# Patient Record
Sex: FEMALE | Race: WHITE | NOT HISPANIC OR LATINO | Employment: UNEMPLOYED | URBAN - METROPOLITAN AREA
[De-identification: names, ages, dates, MRNs, and addresses within clinical notes are randomized per-mention and may not be internally consistent; named-entity substitution may affect disease eponyms.]

---

## 2023-05-26 LAB
ALBUMIN (G/DL) IN SER/PLAS: 4.1 G/DL (ref 3.4–5)
ANION GAP IN SER/PLAS: 14 MMOL/L (ref 10–20)
CALCIDIOL (25 OH VITAMIN D3) (NG/ML) IN SER/PLAS: 38 NG/ML
CALCIUM (MG/DL) IN SER/PLAS: 8.9 MG/DL (ref 8.6–10.3)
CARBON DIOXIDE, TOTAL (MMOL/L) IN SER/PLAS: 24 MMOL/L (ref 21–32)
CHLORIDE (MMOL/L) IN SER/PLAS: 103 MMOL/L (ref 98–107)
CREATININE (MG/DL) IN SER/PLAS: 0.81 MG/DL (ref 0.5–1.05)
CREATININE (MG/DL) IN URINE: 52.7 MG/DL (ref 20–320)
ERYTHROCYTE DISTRIBUTION WIDTH (RATIO) BY AUTOMATED COUNT: 13.6 % (ref 11.5–14.5)
ERYTHROCYTE MEAN CORPUSCULAR HEMOGLOBIN CONCENTRATION (G/DL) BY AUTOMATED: 32.3 G/DL (ref 32–36)
ERYTHROCYTE MEAN CORPUSCULAR VOLUME (FL) BY AUTOMATED COUNT: 97 FL (ref 80–100)
ERYTHROCYTES (10*6/UL) IN BLOOD BY AUTOMATED COUNT: 3.29 X10E12/L (ref 4–5.2)
GFR FEMALE: 68 ML/MIN/1.73M2
GLUCOSE (MG/DL) IN SER/PLAS: 98 MG/DL (ref 74–99)
HEMATOCRIT (%) IN BLOOD BY AUTOMATED COUNT: 31.9 % (ref 36–46)
HEMOGLOBIN (G/DL) IN BLOOD: 10.3 G/DL (ref 12–16)
LEUKOCYTES (10*3/UL) IN BLOOD BY AUTOMATED COUNT: 6.6 X10E9/L (ref 4.4–11.3)
PHOSPHATE (MG/DL) IN SER/PLAS: 4.8 MG/DL (ref 2.5–4.9)
PLATELETS (10*3/UL) IN BLOOD AUTOMATED COUNT: 264 X10E9/L (ref 150–450)
POTASSIUM (MMOL/L) IN SER/PLAS: 5.2 MMOL/L (ref 3.5–5.3)
PROTEIN (MG/DL) IN URINE: 14 MG/DL (ref 5–24)
PROTEIN/CREATININE (MG/MG) IN URINE: 0.27 MG/MG CREAT (ref 0–0.17)
SODIUM (MMOL/L) IN SER/PLAS: 136 MMOL/L (ref 136–145)
UREA NITROGEN (MG/DL) IN SER/PLAS: 26 MG/DL (ref 6–23)

## 2023-05-27 LAB — PARATHYRIN INTACT (PG/ML) IN SER/PLAS: 136.4 PG/ML (ref 18.5–88)

## 2024-06-14 ENCOUNTER — HOSPITAL ENCOUNTER (INPATIENT)
Facility: HOSPITAL | Age: 89
DRG: 536 | End: 2024-06-14
Attending: EMERGENCY MEDICINE | Admitting: INTERNAL MEDICINE
Payer: MEDICARE

## 2024-06-14 ENCOUNTER — APPOINTMENT (OUTPATIENT)
Dept: RADIOLOGY | Facility: HOSPITAL | Age: 89
DRG: 536 | End: 2024-06-14
Payer: MEDICARE

## 2024-06-14 ENCOUNTER — APPOINTMENT (OUTPATIENT)
Dept: CARDIOLOGY | Facility: HOSPITAL | Age: 89
DRG: 536 | End: 2024-06-14
Payer: MEDICARE

## 2024-06-14 DIAGNOSIS — S50.02XA CONTUSION OF LEFT ELBOW, INITIAL ENCOUNTER: ICD-10-CM

## 2024-06-14 DIAGNOSIS — S72.115A CLOSED NONDISPLACED FRACTURE OF GREATER TROCHANTER OF LEFT FEMUR, INITIAL ENCOUNTER (MULTI): Primary | ICD-10-CM

## 2024-06-14 DIAGNOSIS — I10 HYPERTENSION, UNSPECIFIED TYPE: ICD-10-CM

## 2024-06-14 DIAGNOSIS — N30.00 ACUTE CYSTITIS WITHOUT HEMATURIA: ICD-10-CM

## 2024-06-14 DIAGNOSIS — R53.1 WEAKNESS: ICD-10-CM

## 2024-06-14 PROBLEM — N39.0 UTI (URINARY TRACT INFECTION): Status: ACTIVE | Noted: 2024-06-14

## 2024-06-14 PROBLEM — D64.9 ANEMIA: Status: ACTIVE | Noted: 2024-06-14

## 2024-06-14 PROBLEM — E11.9 DIABETES (MULTI): Status: RESOLVED | Noted: 2024-06-14 | Resolved: 2024-06-14

## 2024-06-14 PROBLEM — R74.8 ELEVATED CK: Status: ACTIVE | Noted: 2024-06-14

## 2024-06-14 PROBLEM — E11.9 DIABETES (MULTI): Status: ACTIVE | Noted: 2024-06-14

## 2024-06-14 LAB
ALBUMIN SERPL BCP-MCNC: 4.1 G/DL (ref 3.4–5)
ALP SERPL-CCNC: 82 U/L (ref 33–136)
ALT SERPL W P-5'-P-CCNC: 13 U/L (ref 7–45)
ANION GAP SERPL CALC-SCNC: 15 MMOL/L (ref 10–20)
APPEARANCE UR: ABNORMAL
AST SERPL W P-5'-P-CCNC: 25 U/L (ref 9–39)
ATRIAL RATE: 104 BPM
BACTERIA #/AREA URNS AUTO: ABNORMAL /HPF
BASOPHILS # BLD AUTO: 0.06 X10*3/UL (ref 0–0.1)
BASOPHILS NFR BLD AUTO: 0.4 %
BILIRUB SERPL-MCNC: 0.6 MG/DL (ref 0–1.2)
BILIRUB UR STRIP.AUTO-MCNC: NEGATIVE MG/DL
BUN SERPL-MCNC: 29 MG/DL (ref 6–23)
CALCIUM SERPL-MCNC: 8.9 MG/DL (ref 8.6–10.3)
CHLORIDE SERPL-SCNC: 102 MMOL/L (ref 98–107)
CK SERPL-CCNC: 329 U/L (ref 0–215)
CO2 SERPL-SCNC: 24 MMOL/L (ref 21–32)
COLOR UR: ABNORMAL
CREAT SERPL-MCNC: 0.77 MG/DL (ref 0.5–1.05)
EGFRCR SERPLBLD CKD-EPI 2021: 72 ML/MIN/1.73M*2
EOSINOPHIL # BLD AUTO: 0.01 X10*3/UL (ref 0–0.4)
EOSINOPHIL NFR BLD AUTO: 0.1 %
ERYTHROCYTE [DISTWIDTH] IN BLOOD BY AUTOMATED COUNT: 13.4 % (ref 11.5–14.5)
GLUCOSE BLD MANUAL STRIP-MCNC: 126 MG/DL (ref 74–99)
GLUCOSE BLD MANUAL STRIP-MCNC: 177 MG/DL (ref 74–99)
GLUCOSE SERPL-MCNC: 281 MG/DL (ref 74–99)
GLUCOSE UR STRIP.AUTO-MCNC: NORMAL MG/DL
HCT VFR BLD AUTO: 31.9 % (ref 36–46)
HGB BLD-MCNC: 10.7 G/DL (ref 12–16)
IMM GRANULOCYTES # BLD AUTO: 0.1 X10*3/UL (ref 0–0.5)
IMM GRANULOCYTES NFR BLD AUTO: 0.7 % (ref 0–0.9)
INR PPP: 1.1 (ref 0.9–1.1)
KETONES UR STRIP.AUTO-MCNC: NEGATIVE MG/DL
LEUKOCYTE ESTERASE UR QL STRIP.AUTO: ABNORMAL
LYMPHOCYTES # BLD AUTO: 0.86 X10*3/UL (ref 0.8–3)
LYMPHOCYTES NFR BLD AUTO: 5.9 %
MCH RBC QN AUTO: 30.9 PG (ref 26–34)
MCHC RBC AUTO-ENTMCNC: 33.5 G/DL (ref 32–36)
MCV RBC AUTO: 92 FL (ref 80–100)
MONOCYTES # BLD AUTO: 1.47 X10*3/UL (ref 0.05–0.8)
MONOCYTES NFR BLD AUTO: 10.1 %
NEUTROPHILS # BLD AUTO: 12.02 X10*3/UL (ref 1.6–5.5)
NEUTROPHILS NFR BLD AUTO: 82.8 %
NITRITE UR QL STRIP.AUTO: NEGATIVE
NRBC BLD-RTO: 0 /100 WBCS (ref 0–0)
P AXIS: 65 DEGREES
PH UR STRIP.AUTO: 5.5 [PH]
PLATELET # BLD AUTO: 254 X10*3/UL (ref 150–450)
POTASSIUM SERPL-SCNC: 5 MMOL/L (ref 3.5–5.3)
PR INTERVAL: 173 MS
PROT SERPL-MCNC: 7 G/DL (ref 6.4–8.2)
PROT UR STRIP.AUTO-MCNC: ABNORMAL MG/DL
PROTHROMBIN TIME: 12.4 SECONDS (ref 9.8–12.8)
Q ONSET: 252 MS
QRS COUNT: 17 BEATS
QRS DURATION: 83 MS
QT INTERVAL: 397 MS
QTC CALCULATION(BAZETT): 525 MS
QTC FREDERICIA: 478 MS
R AXIS: 10 DEGREES
RBC # BLD AUTO: 3.46 X10*6/UL (ref 4–5.2)
RBC # UR STRIP.AUTO: ABNORMAL /UL
RBC #/AREA URNS AUTO: ABNORMAL /HPF
SODIUM SERPL-SCNC: 136 MMOL/L (ref 136–145)
SP GR UR STRIP.AUTO: 1.01
T OFFSET: 451 MS
UROBILINOGEN UR STRIP.AUTO-MCNC: NORMAL MG/DL
VENTRICULAR RATE: 105 BPM
WBC # BLD AUTO: 14.5 X10*3/UL (ref 4.4–11.3)
WBC #/AREA URNS AUTO: ABNORMAL /HPF

## 2024-06-14 PROCEDURE — 73080 X-RAY EXAM OF ELBOW: CPT | Mod: LEFT SIDE | Performed by: RADIOLOGY

## 2024-06-14 PROCEDURE — 82947 ASSAY GLUCOSE BLOOD QUANT: CPT | Mod: 91

## 2024-06-14 PROCEDURE — 73030 X-RAY EXAM OF SHOULDER: CPT | Mod: LEFT SIDE | Performed by: RADIOLOGY

## 2024-06-14 PROCEDURE — 93005 ELECTROCARDIOGRAM TRACING: CPT

## 2024-06-14 PROCEDURE — 73502 X-RAY EXAM HIP UNI 2-3 VIEWS: CPT | Mod: LT

## 2024-06-14 PROCEDURE — 82550 ASSAY OF CK (CPK): CPT | Performed by: EMERGENCY MEDICINE

## 2024-06-14 PROCEDURE — 2500000001 HC RX 250 WO HCPCS SELF ADMINISTERED DRUGS (ALT 637 FOR MEDICARE OP): Performed by: NURSE PRACTITIONER

## 2024-06-14 PROCEDURE — 2500000004 HC RX 250 GENERAL PHARMACY W/ HCPCS (ALT 636 FOR OP/ED): Performed by: EMERGENCY MEDICINE

## 2024-06-14 PROCEDURE — 73721 MRI JNT OF LWR EXTRE W/O DYE: CPT | Mod: LT

## 2024-06-14 PROCEDURE — 85025 COMPLETE CBC W/AUTO DIFF WBC: CPT | Performed by: EMERGENCY MEDICINE

## 2024-06-14 PROCEDURE — 72125 CT NECK SPINE W/O DYE: CPT

## 2024-06-14 PROCEDURE — 1210000001 HC SEMI-PRIVATE ROOM DAILY

## 2024-06-14 PROCEDURE — 70450 CT HEAD/BRAIN W/O DYE: CPT | Performed by: RADIOLOGY

## 2024-06-14 PROCEDURE — 36415 COLL VENOUS BLD VENIPUNCTURE: CPT | Performed by: EMERGENCY MEDICINE

## 2024-06-14 PROCEDURE — 99222 1ST HOSP IP/OBS MODERATE 55: CPT | Performed by: STUDENT IN AN ORGANIZED HEALTH CARE EDUCATION/TRAINING PROGRAM

## 2024-06-14 PROCEDURE — 96365 THER/PROPH/DIAG IV INF INIT: CPT

## 2024-06-14 PROCEDURE — 2500000002 HC RX 250 W HCPCS SELF ADMINISTERED DRUGS (ALT 637 FOR MEDICARE OP, ALT 636 FOR OP/ED): Performed by: NURSE PRACTITIONER

## 2024-06-14 PROCEDURE — 2500000004 HC RX 250 GENERAL PHARMACY W/ HCPCS (ALT 636 FOR OP/ED): Performed by: NURSE PRACTITIONER

## 2024-06-14 PROCEDURE — 72192 CT PELVIS W/O DYE: CPT

## 2024-06-14 PROCEDURE — 99285 EMERGENCY DEPT VISIT HI MDM: CPT | Mod: 25

## 2024-06-14 PROCEDURE — 72125 CT NECK SPINE W/O DYE: CPT | Performed by: RADIOLOGY

## 2024-06-14 PROCEDURE — 87086 URINE CULTURE/COLONY COUNT: CPT | Mod: PORLAB | Performed by: EMERGENCY MEDICINE

## 2024-06-14 PROCEDURE — 85610 PROTHROMBIN TIME: CPT | Performed by: EMERGENCY MEDICINE

## 2024-06-14 PROCEDURE — 83036 HEMOGLOBIN GLYCOSYLATED A1C: CPT | Performed by: NURSE PRACTITIONER

## 2024-06-14 PROCEDURE — 96375 TX/PRO/DX INJ NEW DRUG ADDON: CPT

## 2024-06-14 PROCEDURE — 70450 CT HEAD/BRAIN W/O DYE: CPT

## 2024-06-14 PROCEDURE — 80053 COMPREHEN METABOLIC PANEL: CPT | Performed by: EMERGENCY MEDICINE

## 2024-06-14 PROCEDURE — 73502 X-RAY EXAM HIP UNI 2-3 VIEWS: CPT | Mod: LEFT SIDE | Performed by: RADIOLOGY

## 2024-06-14 PROCEDURE — 73030 X-RAY EXAM OF SHOULDER: CPT | Mod: LT

## 2024-06-14 PROCEDURE — 73080 X-RAY EXAM OF ELBOW: CPT | Mod: LT

## 2024-06-14 PROCEDURE — 99222 1ST HOSP IP/OBS MODERATE 55: CPT | Performed by: NURSE PRACTITIONER

## 2024-06-14 PROCEDURE — 72192 CT PELVIS W/O DYE: CPT | Mod: FOREIGN READ | Performed by: RADIOLOGY

## 2024-06-14 PROCEDURE — 81001 URINALYSIS AUTO W/SCOPE: CPT | Performed by: EMERGENCY MEDICINE

## 2024-06-14 RX ORDER — DEXTROSE 50 % IN WATER (D50W) INTRAVENOUS SYRINGE
12.5
Status: DISCONTINUED | OUTPATIENT
Start: 2024-06-14 | End: 2024-06-18 | Stop reason: HOSPADM

## 2024-06-14 RX ORDER — PHENYTOIN SODIUM 100 MG/1
100 CAPSULE, EXTENDED RELEASE ORAL 3 TIMES DAILY
Status: DISCONTINUED | OUTPATIENT
Start: 2024-06-14 | End: 2024-06-18 | Stop reason: HOSPADM

## 2024-06-14 RX ORDER — ATORVASTATIN CALCIUM 40 MG/1
40 TABLET, FILM COATED ORAL DAILY
COMMUNITY

## 2024-06-14 RX ORDER — ONDANSETRON 4 MG/1
4 TABLET, ORALLY DISINTEGRATING ORAL EVERY 8 HOURS PRN
Status: DISCONTINUED | OUTPATIENT
Start: 2024-06-14 | End: 2024-06-18 | Stop reason: HOSPADM

## 2024-06-14 RX ORDER — ONDANSETRON HYDROCHLORIDE 2 MG/ML
4 INJECTION, SOLUTION INTRAVENOUS ONCE
Status: COMPLETED | OUTPATIENT
Start: 2024-06-14 | End: 2024-06-14

## 2024-06-14 RX ORDER — POLYETHYLENE GLYCOL 3350 17 G/17G
17 POWDER, FOR SOLUTION ORAL DAILY
Status: DISCONTINUED | OUTPATIENT
Start: 2024-06-14 | End: 2024-06-18 | Stop reason: HOSPADM

## 2024-06-14 RX ORDER — PHENYTOIN SODIUM 100 MG/1
100 CAPSULE, EXTENDED RELEASE ORAL 3 TIMES DAILY
COMMUNITY

## 2024-06-14 RX ORDER — PANTOPRAZOLE SODIUM 40 MG/1
40 TABLET, DELAYED RELEASE ORAL
COMMUNITY

## 2024-06-14 RX ORDER — BISACODYL 5 MG
10 TABLET, DELAYED RELEASE (ENTERIC COATED) ORAL DAILY PRN
Status: DISCONTINUED | OUTPATIENT
Start: 2024-06-14 | End: 2024-06-18 | Stop reason: HOSPADM

## 2024-06-14 RX ORDER — LEVOTHYROXINE SODIUM 112 UG/1
112 TABLET ORAL DAILY
Status: DISCONTINUED | OUTPATIENT
Start: 2024-06-14 | End: 2024-06-18 | Stop reason: HOSPADM

## 2024-06-14 RX ORDER — ATORVASTATIN CALCIUM 40 MG/1
40 TABLET, FILM COATED ORAL NIGHTLY
Status: DISCONTINUED | OUTPATIENT
Start: 2024-06-14 | End: 2024-06-18 | Stop reason: HOSPADM

## 2024-06-14 RX ORDER — SODIUM CHLORIDE 9 MG/ML
75 INJECTION, SOLUTION INTRAVENOUS CONTINUOUS
Status: DISCONTINUED | OUTPATIENT
Start: 2024-06-14 | End: 2024-06-15

## 2024-06-14 RX ORDER — BISACODYL 10 MG/1
10 SUPPOSITORY RECTAL DAILY PRN
Status: DISCONTINUED | OUTPATIENT
Start: 2024-06-14 | End: 2024-06-18 | Stop reason: HOSPADM

## 2024-06-14 RX ORDER — LOSARTAN POTASSIUM 50 MG/1
100 TABLET ORAL DAILY
COMMUNITY

## 2024-06-14 RX ORDER — PANTOPRAZOLE SODIUM 40 MG/10ML
40 INJECTION, POWDER, LYOPHILIZED, FOR SOLUTION INTRAVENOUS
Status: DISCONTINUED | OUTPATIENT
Start: 2024-06-15 | End: 2024-06-18 | Stop reason: HOSPADM

## 2024-06-14 RX ORDER — CEFTRIAXONE 1 G/50ML
1 INJECTION, SOLUTION INTRAVENOUS ONCE
Status: COMPLETED | OUTPATIENT
Start: 2024-06-14 | End: 2024-06-14

## 2024-06-14 RX ORDER — DEXTROSE 50 % IN WATER (D50W) INTRAVENOUS SYRINGE
25
Status: DISCONTINUED | OUTPATIENT
Start: 2024-06-14 | End: 2024-06-18 | Stop reason: HOSPADM

## 2024-06-14 RX ORDER — FENTANYL CITRATE 50 UG/ML
25 INJECTION, SOLUTION INTRAMUSCULAR; INTRAVENOUS ONCE
Status: COMPLETED | OUTPATIENT
Start: 2024-06-14 | End: 2024-06-14

## 2024-06-14 RX ORDER — BISACODYL 5 MG
15 TABLET, DELAYED RELEASE (ENTERIC COATED) ORAL ONCE
Status: COMPLETED | OUTPATIENT
Start: 2024-06-14 | End: 2024-06-14

## 2024-06-14 RX ORDER — PANTOPRAZOLE SODIUM 40 MG/1
40 TABLET, DELAYED RELEASE ORAL
Status: DISCONTINUED | OUTPATIENT
Start: 2024-06-15 | End: 2024-06-18 | Stop reason: HOSPADM

## 2024-06-14 RX ORDER — HEPARIN SODIUM 5000 [USP'U]/ML
5000 INJECTION, SOLUTION INTRAVENOUS; SUBCUTANEOUS EVERY 8 HOURS
Status: DISCONTINUED | OUTPATIENT
Start: 2024-06-14 | End: 2024-06-18 | Stop reason: HOSPADM

## 2024-06-14 RX ORDER — LOSARTAN POTASSIUM 50 MG/1
100 TABLET ORAL DAILY
Status: DISCONTINUED | OUTPATIENT
Start: 2024-06-14 | End: 2024-06-18 | Stop reason: HOSPADM

## 2024-06-14 RX ORDER — ONDANSETRON HYDROCHLORIDE 2 MG/ML
4 INJECTION, SOLUTION INTRAVENOUS EVERY 8 HOURS PRN
Status: DISCONTINUED | OUTPATIENT
Start: 2024-06-14 | End: 2024-06-18 | Stop reason: HOSPADM

## 2024-06-14 RX ORDER — LEVOTHYROXINE SODIUM 112 UG/1
CAPSULE ORAL DAILY
COMMUNITY

## 2024-06-14 RX ORDER — CALCIUM CARBONATE 200(500)MG
500 TABLET,CHEWABLE ORAL 4 TIMES DAILY PRN
Status: DISCONTINUED | OUTPATIENT
Start: 2024-06-14 | End: 2024-06-18 | Stop reason: HOSPADM

## 2024-06-14 SDOH — SOCIAL STABILITY: SOCIAL INSECURITY: HAVE YOU HAD THOUGHTS OF HARMING ANYONE ELSE?: NO

## 2024-06-14 SDOH — SOCIAL STABILITY: SOCIAL INSECURITY: WERE YOU ABLE TO COMPLETE ALL THE BEHAVIORAL HEALTH SCREENINGS?: YES

## 2024-06-14 ASSESSMENT — PAIN DESCRIPTION - ORIENTATION: ORIENTATION: LEFT

## 2024-06-14 ASSESSMENT — ENCOUNTER SYMPTOMS
FATIGUE: 0
NECK PAIN: 0
PALPITATIONS: 0
UNEXPECTED WEIGHT CHANGE: 0
CONFUSION: 0
WEAKNESS: 0
EYE ITCHING: 0
ADENOPATHY: 0
HALLUCINATIONS: 0
DYSPHORIC MOOD: 0
EYE DISCHARGE: 0
VOICE CHANGE: 0
BLOOD IN STOOL: 0
FLANK PAIN: 0
CHOKING: 0
BRUISES/BLEEDS EASILY: 0
DIARRHEA: 0
POLYDIPSIA: 0
WOUND: 0
EYE PAIN: 0
NECK STIFFNESS: 0
PHOTOPHOBIA: 0
FEVER: 0
COLOR CHANGE: 0
SHORTNESS OF BREATH: 0
APNEA: 0
TREMORS: 0
ARTHRALGIAS: 0
FREQUENCY: 0
WHEEZING: 0
ABDOMINAL DISTENTION: 0
DYSURIA: 0
DIFFICULTY URINATING: 0
COUGH: 0
SORE THROAT: 0
APPETITE CHANGE: 0
BACK PAIN: 1
ABDOMINAL PAIN: 0
SLEEP DISTURBANCE: 0
NAUSEA: 0
DIZZINESS: 0
HEMATURIA: 0
NERVOUS/ANXIOUS: 0
HEADACHES: 0
NUMBNESS: 0
SINUS PAIN: 0
VOMITING: 0
MYALGIAS: 0
TROUBLE SWALLOWING: 0
CHEST TIGHTNESS: 0
SEIZURES: 0
POLYPHAGIA: 0
LIGHT-HEADEDNESS: 0
CONSTIPATION: 0
CHILLS: 0
SPEECH DIFFICULTY: 0

## 2024-06-14 ASSESSMENT — COGNITIVE AND FUNCTIONAL STATUS - GENERAL
HELP NEEDED FOR BATHING: A LITTLE
TURNING FROM BACK TO SIDE WHILE IN FLAT BAD: A LOT
PATIENT BASELINE BEDBOUND: NO
MOBILITY SCORE: 9
TURNING FROM BACK TO SIDE WHILE IN FLAT BAD: A LOT
CLIMB 3 TO 5 STEPS WITH RAILING: TOTAL
MOBILITY SCORE: 9
DRESSING REGULAR UPPER BODY CLOTHING: A LITTLE
DRESSING REGULAR UPPER BODY CLOTHING: A LITTLE
MOBILITY SCORE: 9
MOVING TO AND FROM BED TO CHAIR: TOTAL
DRESSING REGULAR LOWER BODY CLOTHING: TOTAL
WALKING IN HOSPITAL ROOM: TOTAL
MOVING TO AND FROM BED TO CHAIR: TOTAL
TOILETING: A LOT
TOILETING: A LOT
CLIMB 3 TO 5 STEPS WITH RAILING: TOTAL
MOVING FROM LYING ON BACK TO SITTING ON SIDE OF FLAT BED WITH BEDRAILS: A LITTLE
STANDING UP FROM CHAIR USING ARMS: TOTAL
DAILY ACTIVITIY SCORE: 22
HELP NEEDED FOR BATHING: A LOT
STANDING UP FROM CHAIR USING ARMS: TOTAL
MOVING FROM LYING ON BACK TO SITTING ON SIDE OF FLAT BED WITH BEDRAILS: A LITTLE
WALKING IN HOSPITAL ROOM: TOTAL
TURNING FROM BACK TO SIDE WHILE IN FLAT BAD: A LOT
DRESSING REGULAR LOWER BODY CLOTHING: TOTAL
DAILY ACTIVITIY SCORE: 16
CLIMB 3 TO 5 STEPS WITH RAILING: TOTAL
MOVING FROM LYING ON BACK TO SITTING ON SIDE OF FLAT BED WITH BEDRAILS: A LITTLE
HELP NEEDED FOR BATHING: A LOT
DAILY ACTIVITIY SCORE: 16
STANDING UP FROM CHAIR USING ARMS: TOTAL
DRESSING REGULAR LOWER BODY CLOTHING: A LITTLE
WALKING IN HOSPITAL ROOM: TOTAL
MOVING TO AND FROM BED TO CHAIR: TOTAL

## 2024-06-14 ASSESSMENT — ACTIVITIES OF DAILY LIVING (ADL)
TOILETING: NEEDS ASSISTANCE
HEARING - LEFT EAR: FUNCTIONAL
DRESSING YOURSELF: INDEPENDENT
JUDGMENT_ADEQUATE_SAFELY_COMPLETE_DAILY_ACTIVITIES: YES
GROOMING: INDEPENDENT
ADEQUATE_TO_COMPLETE_ADL: YES
JUDGMENT_ADEQUATE_SAFELY_COMPLETE_DAILY_ACTIVITIES: YES
PATIENT'S MEMORY ADEQUATE TO SAFELY COMPLETE DAILY ACTIVITIES?: YES
ADEQUATE_TO_COMPLETE_ADL: YES
FEEDING YOURSELF: INDEPENDENT
DRESSING YOURSELF: NEEDS ASSISTANCE
GROOMING: INDEPENDENT
WALKS IN HOME: INDEPENDENT
HEARING - LEFT EAR: FUNCTIONAL
PATIENT'S MEMORY ADEQUATE TO SAFELY COMPLETE DAILY ACTIVITIES?: YES
BATHING: NEEDS ASSISTANCE
LACK_OF_TRANSPORTATION: NO
HEARING - RIGHT EAR: FUNCTIONAL
HEARING - RIGHT EAR: FUNCTIONAL
FEEDING YOURSELF: INDEPENDENT
WALKS IN HOME: NEEDS ASSISTANCE
TOILETING: INDEPENDENT
BATHING: INDEPENDENT

## 2024-06-14 ASSESSMENT — LIFESTYLE VARIABLES
SUBSTANCE_ABUSE_PAST_12_MONTHS: NO
HOW OFTEN DO YOU HAVE 6 OR MORE DRINKS ON ONE OCCASION: NEVER
TOTAL SCORE: 0
EVER HAD A DRINK FIRST THING IN THE MORNING TO STEADY YOUR NERVES TO GET RID OF A HANGOVER: NO
HAVE PEOPLE ANNOYED YOU BY CRITICIZING YOUR DRINKING: NO
HOW OFTEN DO YOU HAVE A DRINK CONTAINING ALCOHOL: NEVER
EVER FELT BAD OR GUILTY ABOUT YOUR DRINKING: NO
PRESCIPTION_ABUSE_PAST_12_MONTHS: NO
HAVE YOU EVER FELT YOU SHOULD CUT DOWN ON YOUR DRINKING: NO
SKIP TO QUESTIONS 9-10: 1
AUDIT-C TOTAL SCORE: 0
AUDIT-C TOTAL SCORE: 0
HOW MANY STANDARD DRINKS CONTAINING ALCOHOL DO YOU HAVE ON A TYPICAL DAY: PATIENT DOES NOT DRINK

## 2024-06-14 ASSESSMENT — PATIENT HEALTH QUESTIONNAIRE - PHQ9
SUM OF ALL RESPONSES TO PHQ9 QUESTIONS 1 & 2: 0
1. LITTLE INTEREST OR PLEASURE IN DOING THINGS: NOT AT ALL
2. FEELING DOWN, DEPRESSED OR HOPELESS: NOT AT ALL

## 2024-06-14 ASSESSMENT — PAIN SCALES - GENERAL
PAINLEVEL_OUTOF10: 6
PAINLEVEL_OUTOF10: 4
PAINLEVEL_OUTOF10: 6
PAINLEVEL_OUTOF10: 7
PAINLEVEL_OUTOF10: 3
PAINLEVEL_OUTOF10: 0 - NO PAIN
PAINLEVEL_OUTOF10: 9
PAINLEVEL_OUTOF10: 3
PAINLEVEL_OUTOF10: 0 - NO PAIN
PAINLEVEL_OUTOF10: 4

## 2024-06-14 ASSESSMENT — PAIN DESCRIPTION - DESCRIPTORS: DESCRIPTORS: ACHING

## 2024-06-14 ASSESSMENT — PAIN - FUNCTIONAL ASSESSMENT
PAIN_FUNCTIONAL_ASSESSMENT: 0-10
PAIN_FUNCTIONAL_ASSESSMENT: 0-10

## 2024-06-14 ASSESSMENT — PAIN DESCRIPTION - LOCATION: LOCATION: HIP

## 2024-06-14 ASSESSMENT — PAIN DESCRIPTION - PAIN TYPE: TYPE: ACUTE PAIN

## 2024-06-14 NOTE — ED PROVIDER NOTES
HPI   Chief Complaint   Patient presents with    Fall     C/O fall, was laying on garage floor after taking trash out at 5 pm yesterday.       Patient presents to the emergency department after a fall.  Patient fell in her garage last night trying to take out the trash.  She was unable to get up after the fall.  She laid on the floor in the garage all night.  She was unable to get help.  She does not have a life alert button.  She is complaining of left elbow and left hip pain.  She also is having left shoulder pain.  Last tetanus is less than 5 years ago.  She denies hitting her head or neck pain.  She denies back pain.  She states it was a mechanical fall.  No chest pain or shortness of breath.  No abdominal pain.  No nausea or vomiting.  No other complaints at this time.                          No data recorded       NIH Stroke Scale: 0          Patient History   No past medical history on file.  No past surgical history on file.  No family history on file.  Social History     Tobacco Use    Smoking status: Not on file    Smokeless tobacco: Not on file   Substance Use Topics    Alcohol use: Not on file    Drug use: Not on file       Physical Exam   ED Triage Vitals   Temperature Heart Rate Respirations BP   06/14/24 1042 06/14/24 1042 06/14/24 1042 06/14/24 1100   36.7 °C (98 °F) (!) 118 18 142/90      Pulse Ox Temp Source Heart Rate Source Patient Position   06/14/24 1042 06/14/24 1042 06/14/24 1042 06/14/24 1042   (!) 93 % Tympanic Monitor Lying      BP Location FiO2 (%)     06/14/24 1042 06/14/24 1042     Right arm 21 %       Physical Exam  Vitals and nursing note reviewed.   Constitutional:       Appearance: Normal appearance.   HENT:      Head: Normocephalic and atraumatic.      Right Ear: Tympanic membrane normal.      Left Ear: Tympanic membrane normal.      Nose: Nose normal.      Mouth/Throat:      Mouth: Mucous membranes are moist.   Eyes:      Extraocular Movements: Extraocular movements intact.       Pupils: Pupils are equal, round, and reactive to light.   Cardiovascular:      Rate and Rhythm: Normal rate and regular rhythm.      Pulses: Normal pulses.      Heart sounds: Normal heart sounds.   Pulmonary:      Effort: Pulmonary effort is normal.      Breath sounds: Normal breath sounds.   Abdominal:      General: Abdomen is flat. Bowel sounds are normal.      Palpations: Abdomen is soft.      Tenderness: There is no abdominal tenderness. There is no guarding or rebound.   Musculoskeletal:      Cervical back: Normal range of motion. No tenderness.      Comments: Patient has pain with range of motion of the left shoulder and left elbow.  She has multiple abrasions to the left elbow.  She has pain with range of motion of the left hip.  Normal pulses in all 4 extremities.   Skin:     General: Skin is warm.      Capillary Refill: Capillary refill takes less than 2 seconds.      Comments: Abrasions to the left elbow.   Neurological:      General: No focal deficit present.      Mental Status: She is alert and oriented to person, place, and time.   Psychiatric:         Mood and Affect: Mood normal.         Behavior: Behavior normal.       Labs Reviewed - No data to display  Pain Management Panel           No data to display              No orders to display     ED Course & MDM   Diagnoses as of 06/14/24 1920   Closed nondisplaced fracture of greater trochanter of left femur, initial encounter (Multi)   Acute cystitis without hematuria   Weakness   Contusion of left elbow, initial encounter       Medical Decision Making  Patient presents to be evaluated after a fall.  Differential diagnosis is left shoulder fracture and left elbow fracture left hip fracture multiple contusions patient is at risk for rhabdomyolysis.  CT head and cervical spine are obtained.  X-rays of the left shoulder left elbow and left hip are obtained.  Laboratory workup is obtained.  CPK is included in laboratory workup.  Patient is given IV fluids.   She is given 25 mcg of fentanyl and 4 mg of Zofran for pain and nausea.  Patient is placed on a cardiac monitor.  CT scans are negative for acute process.  Left hip x-ray does show evidence of a greater trochanteric femur fracture.  This is nonsurgical.  CT pelvis is obtained to evaluate for any occult fracture causing patient's symptoms.  Patient's urine does show evidence of urinary tract infection.  She is given 1 g of ceftriaxone.  Patient is discussed with orthopedics and also with medicine for admission.  Patient is admitted in stable condition for further evaluation and treatment.        Procedure  Procedures     Ciera Luke MD  06/14/24 7228

## 2024-06-14 NOTE — H&P
History Obtained From: Patient    History Of Present Illness:  Joseline Umana is a 89 y.o. female with PMHx s/f HTN,  presenting with back pain after a fall. Pt lives a lone but has daughters nearby that check on her frequently. Evidently she lost her balance throwing a bag of trash into a can. She fell to the ground injuring her back and was unable to get off the ground, she was found half a day later and brought to the hospital by ambulance.  She denies loss of consciousness, cp, palpitations, near syncope, fever chills. On arrival to the ED pt had T 98,  RR18 and bp 140/92, heart rate improved with analgesia. Pt found to have UTI given 1 G of rocephin.  CBC showing wbc 14.5 Hbg 10.7, platelets 254.  Glucose 281 BUN 29 cratinin is normal ck 329.  CT of the head was negative for intracranial hemorrhage stroke mass or e or acute finding.  CT of the cervical spine was negative for any acute fracture there are degenerative changes noted however.  Shoulder and elbow present without obvious fracture x-ray of the left hip and pelvis appears to show a nondisplaced fracture through the left femoral greater trochanter.  The ED physician discussed these findings with orthopedic surgery on-call who advised that at this point it does not appear to be a surgical case however he also advised to get a CT scan of the of the pelvis, patient is to be admitted to continue treating her urinary tract infection control her pain mobilize the patient with physical therapy and determine a safe disposition for her recuperation.    ED Course:  Diagnoses as of 06/14/24 1516   Closed nondisplaced fracture of greater trochanter of left femur, initial encounter (Multi)   Acute cystitis without hematuria   Weakness   Contusion of left elbow, initial encounter     Relevant Results  Results for orders placed or performed during the hospital encounter of 06/14/24 (from the past 24 hour(s))   CBC and Auto Differential   Result Value Ref Range     WBC 14.5 (H) 4.4 - 11.3 x10*3/uL    nRBC 0.0 0.0 - 0.0 /100 WBCs    RBC 3.46 (L) 4.00 - 5.20 x10*6/uL    Hemoglobin 10.7 (L) 12.0 - 16.0 g/dL    Hematocrit 31.9 (L) 36.0 - 46.0 %    MCV 92 80 - 100 fL    MCH 30.9 26.0 - 34.0 pg    MCHC 33.5 32.0 - 36.0 g/dL    RDW 13.4 11.5 - 14.5 %    Platelets 254 150 - 450 x10*3/uL    Neutrophils % 82.8 40.0 - 80.0 %    Immature Granulocytes %, Automated 0.7 0.0 - 0.9 %    Lymphocytes % 5.9 13.0 - 44.0 %    Monocytes % 10.1 2.0 - 10.0 %    Eosinophils % 0.1 0.0 - 6.0 %    Basophils % 0.4 0.0 - 2.0 %    Neutrophils Absolute 12.02 (H) 1.60 - 5.50 x10*3/uL    Immature Granulocytes Absolute, Automated 0.10 0.00 - 0.50 x10*3/uL    Lymphocytes Absolute 0.86 0.80 - 3.00 x10*3/uL    Monocytes Absolute 1.47 (H) 0.05 - 0.80 x10*3/uL    Eosinophils Absolute 0.01 0.00 - 0.40 x10*3/uL    Basophils Absolute 0.06 0.00 - 0.10 x10*3/uL   Comprehensive metabolic panel   Result Value Ref Range    Glucose 281 (H) 74 - 99 mg/dL    Sodium 136 136 - 145 mmol/L    Potassium 5.0 3.5 - 5.3 mmol/L    Chloride 102 98 - 107 mmol/L    Bicarbonate 24 21 - 32 mmol/L    Anion Gap 15 10 - 20 mmol/L    Urea Nitrogen 29 (H) 6 - 23 mg/dL    Creatinine 0.77 0.50 - 1.05 mg/dL    eGFR 72 >60 mL/min/1.73m*2    Calcium 8.9 8.6 - 10.3 mg/dL    Albumin 4.1 3.4 - 5.0 g/dL    Alkaline Phosphatase 82 33 - 136 U/L    Total Protein 7.0 6.4 - 8.2 g/dL    AST 25 9 - 39 U/L    Bilirubin, Total 0.6 0.0 - 1.2 mg/dL    ALT 13 7 - 45 U/L   Protime-INR   Result Value Ref Range    Protime 12.4 9.8 - 12.8 seconds    INR 1.1 0.9 - 1.1   Cardiac Enzymes - CPK   Result Value Ref Range    Creatine Kinase 329 (H) 0 - 215 U/L   ECG 12 lead   Result Value Ref Range    Ventricular Rate 105 BPM    Atrial Rate 104 BPM    AR Interval 173 ms    QRS Duration 83 ms    QT Interval 397 ms    QTC Calculation(Bazett) 525 ms    P Axis 65 degrees    R Axis 10 degrees    QRS Count 17 beats    Q Onset 252 ms    T Offset 451 ms    QTC Fredericia 478 ms    Urinalysis with Reflex Culture and Microscopic   Result Value Ref Range    Color, Urine Light-Orange (N) Light-Yellow, Yellow, Dark-Yellow    Appearance, Urine Turbid (N) Clear    Specific Gravity, Urine 1.015 1.005 - 1.035    pH, Urine 5.5 5.0, 5.5, 6.0, 6.5, 7.0, 7.5, 8.0    Protein, Urine 200 (2+) (A) NEGATIVE, 10 (TRACE), 20 (TRACE) mg/dL    Glucose, Urine Normal Normal mg/dL    Blood, Urine 0.1 (1+) (A) NEGATIVE    Ketones, Urine NEGATIVE NEGATIVE mg/dL    Bilirubin, Urine NEGATIVE NEGATIVE    Urobilinogen, Urine Normal Normal mg/dL    Nitrite, Urine NEGATIVE NEGATIVE    Leukocyte Esterase, Urine 250 Jojo/µL (A) NEGATIVE   Microscopic Only, Urine   Result Value Ref Range    WBC, Urine 11-20 (A) 1-5, NONE /HPF    RBC, Urine 1-2 NONE, 1-2, 3-5 /HPF    Bacteria, Urine 4+ (A) NONE SEEN /HPF      XR hip left with pelvis when performed 2 or 3 views    Result Date: 6/14/2024  Interpreted By:  Kevon Lyn, STUDY: XR HIP LEFT WITH PELVIS WHEN PERFORMED 2 OR 3 VIEWS;  6/14/2024 1:23 pm   INDICATION: Signs/Symptoms:fall.   COMPARISON: None.   ACCESSION NUMBER(S): [id number]   ORDERING CLINICIAN: CUONG CAMERON   FINDINGS: Exam limited due to underexposure as well as overlying external devices. Nondisplaced fracture through the left femoral greater trochanter. No definite pelvic fracture identified otherwise. Moderate degenerative changes of the hips.       Nondisplaced fracture through the left femoral greater trochanter.   MACRO: None   Signed by: Kevon Lyn 6/14/2024 1:54 PM Dictation workstation:   [id number]    XR elbow left 3+ views    Result Date: 6/14/2024  Interpreted By:  Kevon Lyn, STUDY: XR ELBOW LEFT 3+ VIEWS;  6/14/2024 1:23 pm   INDICATION: Signs/Symptoms:fall.   COMPARISON: None.   ACCESSION NUMBER(S): [id number]   ORDERING CLINICIAN: CUNOG CAMERON   FINDINGS: No acute fracture or dislocation. No definite joint effusion. Small osteophytes indicate degenerative changes.       No acute  osseous findings of the left elbow.   MACRO: None   Signed by: Kevon Lyn 6/14/2024 1:52 PM Dictation workstation:   [id number]    XR shoulder left 2+ views    Result Date: 6/14/2024  Interpreted By:  Layo Garcia, STUDY: XR SHOULDER LEFT 2+ VIEWS; ;  6/14/2024 1:23 pm   INDICATION: Signs/Symptoms:fall.   COMPARISON: None.   ACCESSION NUMBER(S): [id number]   ORDERING CLINICIAN: CUONG CAMERON   FINDINGS: Loss of the acromial humeral interval with high riding of the humeral head with respect to the glenoid fossa favors chronic rotator cuff pathology. There is calcific tendinosis of the rotator cuff. AC joint is normal.       Stigmata of chronic rotator cuff pathology with advanced arthritis of the glenohumeral joint. No acute osseous abnormality     MACRO: None   Signed by: Layo Garcia 6/14/2024 1:41 PM Dictation workstation:   [id number]    CT cervical spine wo IV contrast    Result Date: 6/14/2024  Interpreted By:  Tj Brizuela, STUDY: CT CERVICAL SPINE WO IV CONTRAST; 6/14/2024 12:52 pm   INDICATION: Signs/Symptoms:fall.   COMPARISON: None.   ACCESSION NUMBER(S): [id number]   ORDERING CLINICIAN: CUONG CAMERON   TECHNIQUE: Contiguous axial CT images were obtained at  2 mm slice thickness through the cervical spine without contrast administration. The images were then reconstructed in the coronal and sagittal planes.   FINDINGS: There is no CT evidence of acute fracture identified. No prevertebral soft tissue swelling is identified.   ALIGNMENT: There is minimal anterolisthesis of C3 on C4, of C6 on C7 and of C7 on T1 and mild retrolisthesis of C4 on C5.   VERTEBRAE/DISC SPACES: Moderate disc space narrowing and marginal osteophyte formation is seen at the C4-5 and C5-6 levels. Minimal discogenic degenerative changes are seen elsewhere in the cervical spine. Mild facet degenerative changes are seen throughout the cervical spine.   C2-3: There is no significant neural foraminal or central  canal narrowing identified.   C3-4: There is no significant neural foraminal or central canal narrowing identified.   C4-5: Moderate neural foraminal narrowing is seen bilaterally. No significant central canal narrowing is seen.   C5-6: Mild neural foraminal narrowing is seen bilaterally. No significant central canal narrowing is seen.   C6-7: There is no significant neural foraminal or central canal narrowing identified.   C7-T1: There is no significant neural foraminal or central canal narrowing identified.   ADDITIONAL FINDINGS: Evaluation of the visualized soft tissues of the neck is limited by the lack of intravenous contrast.  Within this limitation, no gross mass or lymphadenopathy is identified. Significant atherosclerotic calcifications are seen in the carotid bulbs bilaterally.       1.  No CT evidence of acute fracture. 2.  Degenerative changes throughout the cervical spine, as above.   Signed by: Tj Brizuela 6/14/2024 1:19 PM Dictation workstation:   [id number]    CT head wo IV contrast    Result Date: 6/14/2024  Interpreted By:  Tj Brizuela, STUDY: CT HEAD WO IV CONTRAST; 6/14/2024 12:52 pm   INDICATION: Signs/Symptoms:fall.   COMPARISON: CT head dated 08/02/2021   ACCESSION NUMBER(S): [id number]   ORDERING CLINICIAN: CUONG CAMERON   TECHNIQUE: Contiguous axial CT images were obtained through the head at 5 mm slice thickness without contrast administration.   FINDINGS: INTRACRANIAL: Mild diffuse volume loss is seen bilaterally. Mild subcortical and periventricular white matter changes are seen.  The grey-white differentiation is intact. There is no mass effect or midline shift. There is no extraaxial fluid collection. There is no intracranial hemorrhage.  The calvarium  is unremarkable.   EXTRACRANIAL: Visualized paranasal sinuses and mastoids are clear.       1. Diffuse volume loss and periventricular white matter changes, most consistent with small vessel ischemic disease. 2. No evidence  of acute cortical infarct or intracranial hemorrhage.     MACRO: None   Signed by: Tj Brizuela 6/14/2024 1:16 PM Dictation workstation:   [id number]    ECG 12 lead    Result Date: 6/14/2024  Sinus tachycardia Left atrial enlargement Borderline low voltage, extremity leads Probable LVH with secondary repol abnrm Prolonged QT interval Artifact in lead(s) II,V1,V2,V3,V4,V5,V6     Scheduled medications:     Continuous medications:     PRN medications:  PRN medications: HYDROmorphone      Past Medical History  Reports hx HTN, has seen Dr Lawrence in past, Dr Dougie Gomez is PCP    Surgical History  B/L knee replacements     Social History  She has no history on file for tobacco use, alcohol use, and drug use.    Family History  No family history on file.     Allergies  Codeine    Code Status  No Order     Review of Systems   Constitutional:  Negative for appetite change, chills, fatigue, fever and unexpected weight change.   HENT:  Negative for congestion, ear discharge, ear pain, mouth sores, nosebleeds, sinus pain, sore throat, trouble swallowing and voice change.    Eyes:  Negative for photophobia, pain, discharge, itching and visual disturbance.   Respiratory:  Negative for apnea, cough, choking, chest tightness, shortness of breath and wheezing.    Cardiovascular:  Negative for chest pain, palpitations and leg swelling.   Gastrointestinal:  Negative for abdominal distention, abdominal pain, blood in stool, constipation, diarrhea, nausea and vomiting.   Endocrine: Negative for cold intolerance, heat intolerance, polydipsia, polyphagia and polyuria.   Genitourinary:  Negative for decreased urine volume, difficulty urinating, dysuria, flank pain, frequency, hematuria and urgency.   Musculoskeletal:  Positive for back pain. Negative for arthralgias, gait problem, myalgias, neck pain and neck stiffness.   Skin:  Negative for color change, pallor and wound.   Allergic/Immunologic: Negative for food allergies and  immunocompromised state.   Neurological:  Negative for dizziness, tremors, seizures, syncope, speech difficulty, weakness, light-headedness, numbness and headaches.   Hematological:  Negative for adenopathy. Does not bruise/bleed easily.   Psychiatric/Behavioral:  Negative for confusion, dysphoric mood, hallucinations, sleep disturbance and suicidal ideas. The patient is not nervous/anxious.        Last Recorded Vitals  /87 (BP Location: Right arm, Patient Position: Lying)   Pulse 78   Temp 36.7 °C (98 °F) (Tympanic)   Resp 20   Wt (!) 41.2 kg (90 lb 13.3 oz)   SpO2 98%      Physical Exam  Vitals reviewed.   Constitutional:       General: She is not in acute distress.  HENT:      Head: Normocephalic and atraumatic.      Right Ear: External ear normal.      Left Ear: External ear normal.      Nose: Nose normal.      Mouth/Throat:      Mouth: Mucous membranes are moist.      Pharynx: Oropharynx is clear.   Eyes:      Extraocular Movements: Extraocular movements intact.      Conjunctiva/sclera: Conjunctivae normal.      Pupils: Pupils are equal, round, and reactive to light.   Cardiovascular:      Rate and Rhythm: Normal rate and regular rhythm.      Pulses: Normal pulses.      Heart sounds: Normal heart sounds. No murmur heard.  Pulmonary:      Effort: Pulmonary effort is normal. No respiratory distress.      Breath sounds: Normal breath sounds. No wheezing, rhonchi or rales.   Chest:      Chest wall: No tenderness.   Abdominal:      General: Bowel sounds are normal. There is no distension.      Palpations: Abdomen is soft. There is no mass.      Tenderness: There is no abdominal tenderness. There is no rebound.   Musculoskeletal:         General: No swelling or deformity. Normal range of motion.      Cervical back: Normal range of motion.      Right lower leg: No edema.      Left lower leg: No edema.   Skin:     General: Skin is warm and dry.      Capillary Refill: Capillary refill takes less than 2  seconds.   Neurological:      General: No focal deficit present.      Mental Status: She is alert. Mental status is at baseline.      Cranial Nerves: No cranial nerve deficit.      Motor: No weakness.   Psychiatric:         Mood and Affect: Mood normal.         Behavior: Behavior normal.         Assessment/Plan   Principal Problem:    Closed nondisplaced fracture of greater trochanter of left femur, initial encounter (Multi)    Status post fall with left greater trochanter fracture  Patient did have CT of the pelvis  Will obtain orthopedic surgery consultation  Continue pain medications obtain physical therapy for mobilization    Contusion to the left elbow  OT evaluation    UTI continue rocephin    HTN  Continue home medications    DM2  Cover w sliding scale insulin    Elevated CK  Will continue hydration, recheck in the am    Anemia, probably chronic  Will recheck in am      No new Assessment & Plan notes have been filed under this hospital service since the last note was generated.  Service: Internal Medicine          Chet Pinto, APRN-CNP    Dragon dictation software was used to dictate this note and thus there may be minor errors in translation/transcription including garbled speech or misspellings. Please contact for clarification if needed.

## 2024-06-14 NOTE — ED TRIAGE NOTES
Pt to ER via ambulance after falling yesterday at home. Pt states she took her garbage out and lost her balance. Pt has been laying on garage floor since 5pm yesterday. Pt incontinent of large amount of urine. Pt bathed, warm blankets applied. Purewick applied. EKG done on arrival. Pt alert and oriented x 4, follows commands appropriately. Pt c/o left elbow, left hip, and left shoulder pain after falling. Pt denies head and neck pain.

## 2024-06-14 NOTE — PROGRESS NOTES
Joseline Umana is a 89 y.o. female admitted for Closed nondisplaced fracture of greater trochanter of left femur, initial encounter (Multi). Pharmacy reviewed the patient's [id number]-[id number] medications and allergies for accuracy.    The list below reflects the PTA list prior to pharmacy medication history. A summary a changes to the PTA medication list has been listed below. Please review each medication in order reconciliation for additional clarification and justification.    Source of information:  Pharmacy  patient    Medications added:  Added all meds     Medications modified:    Medications to be removed:    Medications of concern:      None       Arlette De Dios

## 2024-06-14 NOTE — ED NOTES
Pt arrives to ED via squad  from home with c/o left elbow, left hip, and left shoulder pain after fall. Pt had mechanical fall yesterday at 1700, and states she could not get up from garage floor. Pt's daughter found her this morning and called EMS. Pt alert and oriented x 4, follows commands appropriately. Pt medicated with Zofran and Fentanyl per order, see mar. Pt lives alone with her black lab Miguel A.    Code Status:  No Order    HPI     Chief Complaint   Patient presents with    Fall     C/O fall, was laying on garage floor after taking trash out at 5 pm yesterday.       /86 (BP Location: Right arm, Patient Position: Lying)   Pulse 78   Temp 36.7 °C (98 °F) (Tympanic)   Resp 20   Wt (!) 41.2 kg (90 lb 13.3 oz)   SpO2 96%     No data recorded    LDA:   Peripheral IV 06/14/24 20 G Proximal;Right;Posterior Forearm (Active)   Placement Date/Time: 06/14/24 1055   Hand Hygiene Completed: Yes  Size (Gauge): 20 G  Orientation: Proximal;Right;Posterior  Location: Forearm  Site Prep: Chlorhexidine    Number of days: 0       External Urinary Catheter Female (Active)   Placement Date/Time: 06/14/24 1100   Placed by: Vivian Coffey RN  Hand Hygiene Completed: Yes  External Catheter Type: Female   Number of days: 0        BACKGROUND  No past medical history on file.  No past surgical history on file.  No current facility-administered medications on file prior to encounter.     No current outpatient medications on file prior to encounter.        ASSESSMENT  Diagnoses as of 06/14/24 1424   Closed nondisplaced fracture of greater trochanter of left femur, initial encounter (Multi)   Acute cystitis without hematuria   Weakness   Contusion of left elbow, initial encounter       Medications Currently Running:       Medications Given:  ED Medication Administration from 06/14/2024 1037 to 06/14/2024 1424         Date/Time Order Dose Route Action Action by     06/14/2024 1122 EDT ondansetron (Zofran) injection 4 mg 4 mg  intravenous Given Johnston, D     06/14/2024 1122 EDT sodium chloride 0.9 % bolus 500 mL 500 mL intravenous New Bag Johnston, D     06/14/2024 1123 EDT fentaNYL PF (Sublimaze) injection 25 mcg 25 mcg intravenous Given Johnston, D     06/14/2024 1152 EDT sodium chloride 0.9 % bolus 500 mL 0 mL intravenous Stopped Johnston, D     06/14/2024 1350 EDT cefTRIAXone (Rocephin) IVPB 1 g 1 g intravenous New Bag Johnston, D     06/14/2024 1420 EDT cefTRIAXone (Rocephin) IVPB 1 g 0 g intravenous Stopped Johnston, D                 RESULTS    Imaging:  XR shoulder left 2+ views   Final Result   Stigmata of chronic rotator cuff pathology with advanced arthritis of   the glenohumeral joint. No acute osseous abnormality             MACRO:   None        Signed by: Layo Garcia 6/14/2024 1:41 PM   Dictation workstation:   [id number]      XR elbow left 3+ views   Final Result   No acute osseous findings of the left elbow.        MACRO:   None        Signed by: Kevon Lyn 6/14/2024 1:52 PM   Dictation workstation:   [id number]      XR hip left with pelvis when performed 2 or 3 views   Final Result   Nondisplaced fracture through the left femoral greater trochanter.        MACRO:   None        Signed by: Kevon Lyn 6/14/2024 1:54 PM   Dictation workstation:   [id number]      CT head wo IV contrast   Final Result   1. Diffuse volume loss and periventricular white matter changes, most   consistent with small vessel ischemic disease.   2. No evidence of acute cortical infarct or intracranial hemorrhage.             MACRO:   None        Signed by: Tj Brizuela 6/14/2024 1:16 PM   Dictation workstation:   [id number]      CT cervical spine wo IV contrast   Final Result   1.  No CT evidence of acute fracture.   2.  Degenerative changes throughout the cervical spine, as above.        Signed by: Tj Brizuela 6/14/2024 1:19 PM   Dictation workstation:   [id number]      CT pelvis wo IV contrast    (Results Pending)      }  Labs  ::99  Abnormal Labs Reviewed   CBC WITH AUTO DIFFERENTIAL - Abnormal; Notable for the following components:       Result Value    WBC 14.5 (*)     RBC 3.46 (*)     Hemoglobin 10.7 (*)     Hematocrit 31.9 (*)     Neutrophils Absolute 12.02 (*)     Monocytes Absolute 1.47 (*)     All other components within normal limits   COMPREHENSIVE METABOLIC PANEL - Abnormal; Notable for the following components:    Glucose 281 (*)     Urea Nitrogen 29 (*)     All other components within normal limits   CREATINE KINASE - Abnormal; Notable for the following components:    Creatine Kinase 329 (*)     All other components within normal limits   URINALYSIS WITH REFLEX CULTURE AND MICROSCOPIC - Abnormal; Notable for the following components:    Color, Urine Light-Orange (*)     Appearance, Urine Turbid (*)     Protein, Urine 200 (2+) (*)     Blood, Urine 0.1 (1+) (*)     Leukocyte Esterase, Urine 250 Jojo/µL (*)     All other components within normal limits   MICROSCOPIC ONLY, URINE - Abnormal; Notable for the following components:    WBC, Urine 11-20 (*)     Bacteria, Urine 4+ (*)     All other components within normal limits                 Vivian Coffey RN  06/14/24 [id number]

## 2024-06-15 LAB
ANION GAP SERPL CALC-SCNC: 14 MMOL/L (ref 10–20)
BUN SERPL-MCNC: 31 MG/DL (ref 6–23)
CALCIUM SERPL-MCNC: 8.4 MG/DL (ref 8.6–10.3)
CHLORIDE SERPL-SCNC: 106 MMOL/L (ref 98–107)
CK SERPL-CCNC: 653 U/L (ref 0–215)
CK SERPL-CCNC: 701 U/L (ref 0–215)
CO2 SERPL-SCNC: 23 MMOL/L (ref 21–32)
CREAT SERPL-MCNC: 0.85 MG/DL (ref 0.5–1.05)
EGFRCR SERPLBLD CKD-EPI 2021: 64 ML/MIN/1.73M*2
ERYTHROCYTE [DISTWIDTH] IN BLOOD BY AUTOMATED COUNT: 13.8 % (ref 11.5–14.5)
EST. AVERAGE GLUCOSE BLD GHB EST-MCNC: 148 MG/DL
FERRITIN SERPL-MCNC: 94 NG/ML (ref 8–150)
GLUCOSE BLD MANUAL STRIP-MCNC: 125 MG/DL (ref 74–99)
GLUCOSE BLD MANUAL STRIP-MCNC: 130 MG/DL (ref 74–99)
GLUCOSE BLD MANUAL STRIP-MCNC: 134 MG/DL (ref 74–99)
GLUCOSE BLD MANUAL STRIP-MCNC: 139 MG/DL (ref 74–99)
GLUCOSE SERPL-MCNC: 136 MG/DL (ref 74–99)
HBA1C MFR BLD: 6.8 %
HCT VFR BLD AUTO: 27.4 % (ref 36–46)
HGB BLD-MCNC: 8.9 G/DL (ref 12–16)
HGB RETIC QN: 33 PG (ref 28–38)
HOLD SPECIMEN: NORMAL
IMMATURE RETIC FRACTION: 9.4 %
IRON SATN MFR SERPL: 14 % (ref 25–45)
IRON SERPL-MCNC: 31 UG/DL (ref 35–150)
MCH RBC QN AUTO: 30.6 PG (ref 26–34)
MCHC RBC AUTO-ENTMCNC: 32.5 G/DL (ref 32–36)
MCV RBC AUTO: 94 FL (ref 80–100)
NRBC BLD-RTO: 0 /100 WBCS (ref 0–0)
PLATELET # BLD AUTO: 192 X10*3/UL (ref 150–450)
POTASSIUM SERPL-SCNC: 4.9 MMOL/L (ref 3.5–5.3)
RBC # BLD AUTO: 2.91 X10*6/UL (ref 4–5.2)
RETICS #: 0.04 X10*6/UL (ref 0.02–0.11)
RETICS/RBC NFR AUTO: 1.5 % (ref 0.5–2)
SODIUM SERPL-SCNC: 138 MMOL/L (ref 136–145)
TIBC SERPL-MCNC: 214 UG/DL (ref 240–445)
UIBC SERPL-MCNC: 183 UG/DL (ref 110–370)
WBC # BLD AUTO: 10 X10*3/UL (ref 4.4–11.3)

## 2024-06-15 PROCEDURE — 36415 COLL VENOUS BLD VENIPUNCTURE: CPT | Performed by: NURSE PRACTITIONER

## 2024-06-15 PROCEDURE — 2500000004 HC RX 250 GENERAL PHARMACY W/ HCPCS (ALT 636 FOR OP/ED): Performed by: NURSE PRACTITIONER

## 2024-06-15 PROCEDURE — 99232 SBSQ HOSP IP/OBS MODERATE 35: CPT | Performed by: NURSE PRACTITIONER

## 2024-06-15 PROCEDURE — 97165 OT EVAL LOW COMPLEX 30 MIN: CPT | Mod: GO | Performed by: OCCUPATIONAL THERAPIST

## 2024-06-15 PROCEDURE — 85045 AUTOMATED RETICULOCYTE COUNT: CPT | Performed by: NURSE PRACTITIONER

## 2024-06-15 PROCEDURE — 80048 BASIC METABOLIC PNL TOTAL CA: CPT | Performed by: NURSE PRACTITIONER

## 2024-06-15 PROCEDURE — 82550 ASSAY OF CK (CPK): CPT | Mod: 91 | Performed by: NURSE PRACTITIONER

## 2024-06-15 PROCEDURE — 83540 ASSAY OF IRON: CPT | Performed by: NURSE PRACTITIONER

## 2024-06-15 PROCEDURE — 2500000001 HC RX 250 WO HCPCS SELF ADMINISTERED DRUGS (ALT 637 FOR MEDICARE OP): Performed by: STUDENT IN AN ORGANIZED HEALTH CARE EDUCATION/TRAINING PROGRAM

## 2024-06-15 PROCEDURE — 2500000001 HC RX 250 WO HCPCS SELF ADMINISTERED DRUGS (ALT 637 FOR MEDICARE OP): Performed by: NURSE PRACTITIONER

## 2024-06-15 PROCEDURE — 82947 ASSAY GLUCOSE BLOOD QUANT: CPT | Mod: 91

## 2024-06-15 PROCEDURE — 82947 ASSAY GLUCOSE BLOOD QUANT: CPT

## 2024-06-15 PROCEDURE — 97161 PT EVAL LOW COMPLEX 20 MIN: CPT | Mod: GP

## 2024-06-15 PROCEDURE — 85027 COMPLETE CBC AUTOMATED: CPT | Performed by: NURSE PRACTITIONER

## 2024-06-15 PROCEDURE — 82728 ASSAY OF FERRITIN: CPT | Performed by: NURSE PRACTITIONER

## 2024-06-15 PROCEDURE — 82550 ASSAY OF CK (CPK): CPT | Performed by: NURSE PRACTITIONER

## 2024-06-15 PROCEDURE — 97530 THERAPEUTIC ACTIVITIES: CPT | Mod: GP

## 2024-06-15 PROCEDURE — 1210000001 HC SEMI-PRIVATE ROOM DAILY

## 2024-06-15 RX ORDER — TRAMADOL HYDROCHLORIDE 50 MG/1
50 TABLET ORAL EVERY 8 HOURS PRN
Status: DISCONTINUED | OUTPATIENT
Start: 2024-06-15 | End: 2024-06-18 | Stop reason: HOSPADM

## 2024-06-15 RX ORDER — AMLODIPINE BESYLATE 5 MG/1
5 TABLET ORAL DAILY
Status: DISCONTINUED | OUTPATIENT
Start: 2024-06-15 | End: 2024-06-16

## 2024-06-15 RX ORDER — BISACODYL 10 MG/1
10 SUPPOSITORY RECTAL ONCE
Status: COMPLETED | OUTPATIENT
Start: 2024-06-15 | End: 2024-06-15

## 2024-06-15 ASSESSMENT — PAIN - FUNCTIONAL ASSESSMENT
PAIN_FUNCTIONAL_ASSESSMENT: 0-10

## 2024-06-15 ASSESSMENT — COGNITIVE AND FUNCTIONAL STATUS - GENERAL
WALKING IN HOSPITAL ROOM: TOTAL
CLIMB 3 TO 5 STEPS WITH RAILING: TOTAL
DRESSING REGULAR UPPER BODY CLOTHING: A LITTLE
STANDING UP FROM CHAIR USING ARMS: TOTAL
WALKING IN HOSPITAL ROOM: TOTAL
TOILETING: TOTAL
MOVING TO AND FROM BED TO CHAIR: A LOT
MOBILITY SCORE: 9
MOVING FROM LYING ON BACK TO SITTING ON SIDE OF FLAT BED WITH BEDRAILS: A LOT
MOVING FROM LYING ON BACK TO SITTING ON SIDE OF FLAT BED WITH BEDRAILS: A LITTLE
CLIMB 3 TO 5 STEPS WITH RAILING: TOTAL
MOBILITY SCORE: 9
TURNING FROM BACK TO SIDE WHILE IN FLAT BAD: A LOT
DRESSING REGULAR LOWER BODY CLOTHING: TOTAL
DRESSING REGULAR UPPER BODY CLOTHING: A LITTLE
DRESSING REGULAR LOWER BODY CLOTHING: TOTAL
DRESSING REGULAR LOWER BODY CLOTHING: TOTAL
HELP NEEDED FOR BATHING: A LOT
DAILY ACTIVITIY SCORE: 14
TOILETING: A LOT
MOVING FROM LYING ON BACK TO SITTING ON SIDE OF FLAT BED WITH BEDRAILS: A LITTLE
DAILY ACTIVITIY SCORE: 16
HELP NEEDED FOR BATHING: TOTAL
TOILETING: A LOT
HELP NEEDED FOR BATHING: A LOT
TURNING FROM BACK TO SIDE WHILE IN FLAT BAD: A LOT
STANDING UP FROM CHAIR USING ARMS: TOTAL
MOVING TO AND FROM BED TO CHAIR: TOTAL
CLIMB 3 TO 5 STEPS WITH RAILING: TOTAL
MOVING TO AND FROM BED TO CHAIR: TOTAL
TURNING FROM BACK TO SIDE WHILE IN FLAT BAD: A LOT
MOBILITY SCORE: 11
STANDING UP FROM CHAIR USING ARMS: A LITTLE
DRESSING REGULAR UPPER BODY CLOTHING: A LITTLE
WALKING IN HOSPITAL ROOM: TOTAL
DAILY ACTIVITIY SCORE: 16

## 2024-06-15 ASSESSMENT — ENCOUNTER SYMPTOMS
CHILLS: 0
FREQUENCY: 0
NAUSEA: 0
FLANK PAIN: 0
FEVER: 0
ABDOMINAL PAIN: 0
ABDOMINAL DISTENTION: 0
SHORTNESS OF BREATH: 0
DYSURIA: 0

## 2024-06-15 ASSESSMENT — ACTIVITIES OF DAILY LIVING (ADL)
ADL_ASSISTANCE: INDEPENDENT
ADL_ASSISTANCE: INDEPENDENT

## 2024-06-15 ASSESSMENT — PAIN SCALES - GENERAL
PAINLEVEL_OUTOF10: 0 - NO PAIN
PAINLEVEL_OUTOF10: 0 - NO PAIN
PAINLEVEL_OUTOF10: 3

## 2024-06-15 NOTE — PROGRESS NOTES
"Occupational Therapy    Evaluation    Patient Name: Joseline Umana  MRN: [id number]  Today's Date: 6/15/2024  Time Calculation  Start Time: 1000  Stop Time: 1021  Time Calculation (min): 21 min  3302/3302-A    Assessment  IP OT Assessment  OT Assessment: decreased trfrs., unable to care for self, assist X 2 for mobility  Prognosis: Good  Barriers to Discharge: Decreased caregiver support  Medical Staff Made Aware: Yes  End of Session Patient Position: Bed, 3 rail up, Alarm on       06/15/24 1000   Inpatient Plan   Treatment Interventions ADL retraining;Functional transfer training   OT Frequency 4 times per week   OT - OK to Discharge Yes  ((when medically approp.))   OT Discharge Recommendations High intensity level of continued care   OT Recommended Transfer Status Assist of 2     Subjective     Current Problem:  1. Closed nondisplaced fracture of greater trochanter of left femur, initial encounter (Multi)        2. Acute cystitis without hematuria        3. Weakness        4. Contusion of left elbow, initial encounter            General:  General  Reason for Referral: fall with L femur fx., no surgical intervention  Referred By: Millie  Past Medical History Relevant to Rehab: Hx. of bilat. TKAs  Co-Treatment: PT  Co-Treatment Reason: to maximize safe mobility  Patient Position Received: Bed, 3 rail up, Alarm on  General Comment: no c/o pain, states biggest concern now is of her dog    Precautions:  LE Weight Bearing Status: Weight Bearing as Tolerated  Medical Precautions: Fall precautions  Precautions Comment: \"greater trochanter precautions\" of no abduction of L hip    Vital Signs:see nurses notes        Pain:  Pain Assessment  Pain Assessment: 0-10  Pain Score: 0 - No pain    Objective     Cognition:  Overall Cognitive Status: Within Functional Limits  Orientation Level: Oriented X4         Home Living:  Type of Home: House  Lives With: Alone  Home Adaptive Equipment: None  Home Layout: One level  Bathroom " Shower/Tub: Walk-in shower  Home Living Comments: 2 dtrs nearby, both retired     Prior Function:  Level of [place]: Independent with homemaking with ambulation, Independent with ADLs and functional transfers  ADL Assistance: Independent  Homemaking Assistance: Independent  Ambulatory Assistance: Independent  Prior Function Comments: cares for large dog, drives    IADL History:  Homemaking Responsibilities: Yes  Meal Prep Responsibility: Primary  Laundry Responsibility: Primary  Cleaning Responsibility: Primary  Shopping Responsibility: Primary  Current License: Yes    ADL:  LE Dressing Assistance: Maximal  Toileting Assistance with Device:  (Mod..A X 2 to trfr. to/from BSC at slow pace with FWW)    Activity Tolerance:  Endurance: Decreased tolerance for upright activites    Bed Mobility/Transfers:   Bed Mobility  Bed Mobility: Yes (Max.A X 2 to sit and to supine)  Transfers  Transfer: Yes (MIn.A X 2 sit-to-stand)    Ambulation/Gait Training:  Functional Mobility  Functional Mobility Performed: Yes (Mod.A X 2 for 2-3 small steps to/from bed at slow pace with FWW)    Sitting Balance:  Dynamic Sitting Balance  Dynamic Sitting-Balance Support:  (CGA, pt. fearful with reaching activity EOB)    Standing Balance:  Dynamic Standing Balance  Dynamic Standing-Balance Support:  (Min.A with FWW)    Vision: Vision - Basic Assessment  Current Vision: No visual deficits    Sensation:  Light Touch: No apparent deficits    Strength:  Strength Comments: UEs WFL    Perception:  Inattention/Neglect: Appears intact    Coordination:  Movements are Fluid and Coordinated: Yes     Hand Function:  Hand Function  Gross Grasp: Functional      Outcome Measures: [facility] Daily Activity  Putting on and taking off regular lower body clothing: Total  Bathing (including washing, rinsing, drying): Total  Putting on and taking off regular upper body clothing: A little  Toileting, which includes using toilet, bedpan or urinal: Total  Taking care of  personal grooming such as brushing teeth: None  Eating Meals: None  Daily Activity - Total Score: 14                    EDUCATION:     Education Documentation  Precautions, taught by Daja Ortiz OT at 6/15/2024 12:04 PM.  Learner: Patient  Readiness: Acceptance  Method: Demonstration  Response: Needs Reinforcement  Comment: hip precautions with mobility, AROM while in bed    Education Comments  No comments found.        Goals:   Encounter Problems       Encounter Problems (Active)       ADLs       Demo. UB bathing, grooming while seated EOB with SBA.  (Progressing)       Start:  06/15/24    Expected End:  06/22/24               BALANCE       Sonia. at least 3 mins. dyn. stand with CGA and FWW (Progressing)       Start:  06/15/24    Expected End:  06/22/24               TRANSFERS       Min.A func. trfrs. with FWW  (Progressing)       Start:  06/15/24    Expected End:  06/22/24

## 2024-06-15 NOTE — CARE PLAN
Problem: Skin  Goal: Decreased wound size/increased tissue granulation at next dressing change  Outcome: Progressing  Flowsheets (Taken 6/15/2024 1020)  Decreased wound size/increased tissue granulation at next dressing change:   Promote sleep for wound healing   Protective dressings over bony prominences  Goal: Participates in plan/prevention/treatment measures  Outcome: Progressing  Flowsheets (Taken 6/15/2024 1020)  Participates in plan/prevention/treatment measures:   Discuss with provider PT/OT consult   Elevate heels   Increase activity/out of bed for meals  Goal: Prevent/manage excess moisture  Outcome: Progressing  Flowsheets (Taken 6/15/2024 1020)  Prevent/manage excess moisture:   Cleanse incontinence/protect with barrier cream   Follow provider orders for dressing changes   Moisturize dry skin   Monitor for/manage infection if present  Goal: Prevent/minimize sheer/friction injuries  Outcome: Progressing  Flowsheets (Taken 6/15/2024 1020)  Prevent/minimize sheer/friction injuries:   Increase activity/out of bed for meals   Turn/reposition every 2 hours/use positioning/transfer devices   Use pull sheet  Goal: Promote/optimize nutrition  Outcome: Progressing  Flowsheets (Taken 6/15/2024 1020)  Promote/optimize nutrition:   Assist with feeding   Consume > 50% meals/supplements   Monitor/record intake including meals   Offer water/supplements/favorite foods  Goal: Promote skin healing  Outcome: Progressing  Flowsheets (Taken 6/15/2024 1020)  Promote skin healing:   Assess skin/pad under line(s)/device(s)   Protective dressings over bony prominences   Turn/reposition every 2 hours/use positioning/transfer devices     Problem: Fall/Injury  Goal: Not fall by end of shift  Outcome: Progressing  Goal: Be free from injury by end of the shift  Outcome: Progressing  Goal: Verbalize understanding of personal risk factors for fall in the hospital  Outcome: Progressing  Goal: Verbalize understanding of risk factor  reduction measures to prevent injury from fall in the home  Outcome: Progressing  Goal: Use assistive devices by end of the shift  Outcome: Progressing  Goal: Pace activities to prevent fatigue by end of the shift  Outcome: Progressing     Problem: Pain  Goal: Takes deep breaths with improved pain control throughout the shift  Outcome: Progressing  Goal: Turns in bed with improved pain control throughout the shift  Outcome: Progressing  Goal: Walks with improved pain control throughout the shift  Outcome: Progressing  Goal: Performs ADL's with improved pain control throughout shift  Outcome: Progressing  Goal: Participates in PT with improved pain control throughout the shift  Outcome: Progressing  Goal: Free from opioid side effects throughout the shift  Outcome: Progressing  Goal: Free from acute confusion related to pain meds throughout the shift  Outcome: Progressing   The patient's goals for the shift include      The clinical goals for the shift include pt will remain free of falls or injury this shift

## 2024-06-15 NOTE — PROGRESS NOTES
Joseline Umana is a 89 y.o. female on day 1 of admission presenting with Closed nondisplaced fracture of greater trochanter of left femur, initial encounter (Multi).      Subjective       06/15/24: No pain at rest, CK slightly more elevated will plan to continue hydration. Anemia also worsened could be dilutional or loss from fracture, will check iron studies. Seen by Ortho, no plans for surgery. Will attempt to mobilize and evaluate pain. Will need long term vte prophylaxis.          Review of Systems   Constitutional:  Negative for chills and fever.   Respiratory:  Negative for shortness of breath.    Cardiovascular:  Negative for chest pain.   Gastrointestinal:  Negative for abdominal distention, abdominal pain and nausea.   Genitourinary:  Negative for dysuria, flank pain and frequency.          Objective     Last Recorded Vitals  /71 (BP Location: Right arm, Patient Position: Lying)   Pulse 90   Temp 37.4 °C (99.3 °F) (Temporal)   Resp 18   Wt (!) 41.2 kg (90 lb 13.3 oz)   SpO2 97%     Image Results  MR hip left wo IV contrast    Result Date: 6/15/2024  Interpreted By:  Finkelstein, Evan, STUDY: MR HIP LEFT WO IV CONTRAST;  6/14/2024 9:46 pm   INDICATION: Signs/Symptoms:L greater trochanteric femur fracture r/o less trochanteric extension.   COMPARISON: CT pelvis 06/14/2025   ACCESSION NUMBER(S): [id number]   ORDERING CLINICIAN: KATHLEEN CHERY   TECHNIQUE: Multiplanar, multisequence MR imaging of the left hip and pelvis without contrast.   FINDINGS: Acute fracture involving the greater trochanter of the left femur. No definite extension to the lesser trochanter or the femoral neck or head. The gluteus saida tendons are intact. There is fluid signal at the insertion of the gluteus medius tendon suggesting partial tear. There is edema throughout the gluteus medius and minimus musculature as well as the visualized muscles in the anterior compartment of the proximal thigh and adductor musculature  compatible with muscle strain. Asymmetric small left femoroacetabular joint effusion.       Acute fracture involving the greater trochanter of the left femur. No definite extension to the lesser trochanter, femoral neck or head. Fluid signal at the insertion of the gluteus medius tendon suggesting partial tear. Edema throughout the gluteus medius and minimus musculature as well as the visualized muscles in the anterior compartment of the proximal thigh and adductor musculature compatible with muscle strain.     MACRO: None.   Signed by: Evan Finkelstein 6/15/2024 2:34 AM Dictation workstation:   [id number]    CT pelvis wo IV contrast    Result Date: 6/14/2024  STUDY: CT PELVIS without CONTRAST; 06/14/2024, 3:30 PM. INDICATION:  Left hip pain. Proximal femur fracture. COMPARISON: Not available. ACCESSION NUMBER(S): [id number] ORDERING CLINICIAN: CUONG CAMERON TECHNIQUE:  Thin section axial images were obtained through the pelvis without intravenous contrast.  Orthogonal reconstructed images were obtained and reviewed.  Automated mA/kV exposure control was utilized and patient examination was performed in strict accordance with principles of ALARA. FINDINGS: There is comminuted fractures involving the greater trochanter of the left femur.  No fractures are seen within the left femoral head.  No fracture is seen within the iliac wings, pubic rami, sacrum, or coccyx.  There is edema seen involving the distal aspect of the left iliopsoas muscle.  No prominent joint effusion is noted.  There is a fecal impaction.  No enlarged lymph as seen in the iliac chains.    1.  Comminuted fractures of the greater trochanter of the left femur, with prominent edema involving the distal left iliopsoas muscle. 2.  Fecal impaction. Signed by Evin Freeman MD    XR hip left with pelvis when performed 2 or 3 views    Result Date: 6/14/2024  Interpreted By:  Kevon Lyn, STUDY: XR HIP LEFT WITH PELVIS WHEN PERFORMED 2 OR 3  VIEWS;  6/14/2024 1:23 pm   INDICATION: Signs/Symptoms:fall.   COMPARISON: None.   ACCESSION NUMBER(S): [id number]   ORDERING CLINICIAN: CUONG CAMERON   FINDINGS: Exam limited due to underexposure as well as overlying external devices. Nondisplaced fracture through the left femoral greater trochanter. No definite pelvic fracture identified otherwise. Moderate degenerative changes of the hips.       Nondisplaced fracture through the left femoral greater trochanter.   MACRO: None   Signed by: Kevon Lyn 6/14/2024 1:54 PM Dictation workstation:   [id number]    XR elbow left 3+ views    Result Date: 6/14/2024  Interpreted By:  Kevon Lyn, STUDY: XR ELBOW LEFT 3+ VIEWS;  6/14/2024 1:23 pm   INDICATION: Signs/Symptoms:fall.   COMPARISON: None.   ACCESSION NUMBER(S): [id number]   ORDERING CLINICIAN: CUONG CAMERON   FINDINGS: No acute fracture or dislocation. No definite joint effusion. Small osteophytes indicate degenerative changes.       No acute osseous findings of the left elbow.   MACRO: None   Signed by: Kevon Lyn 6/14/2024 1:52 PM Dictation workstation:   [id number]    XR shoulder left 2+ views    Result Date: 6/14/2024  Interpreted By:  Layo Garcia, STUDY: XR SHOULDER LEFT 2+ VIEWS; ;  6/14/2024 1:23 pm   INDICATION: Signs/Symptoms:fall.   COMPARISON: None.   ACCESSION NUMBER(S): [id number]   ORDERING CLINICIAN: CUONG CAMERON   FINDINGS: Loss of the acromial humeral interval with high riding of the humeral head with respect to the glenoid fossa favors chronic rotator cuff pathology. There is calcific tendinosis of the rotator cuff. AC joint is normal.       Stigmata of chronic rotator cuff pathology with advanced arthritis of the glenohumeral joint. No acute osseous abnormality     MACRO: None   Signed by: Layo Garcia 6/14/2024 1:41 PM Dictation workstation:   [id number]    CT cervical spine wo IV contrast    Result Date: 6/14/2024  Interpreted By:  Tj Brizuela, STUDY: CT  CERVICAL SPINE WO IV CONTRAST; 6/14/2024 12:52 pm   INDICATION: Signs/Symptoms:fall.   COMPARISON: None.   ACCESSION NUMBER(S): [id number]   ORDERING CLINICIAN: CUONG CAMERON   TECHNIQUE: Contiguous axial CT images were obtained at  2 mm slice thickness through the cervical spine without contrast administration. The images were then reconstructed in the coronal and sagittal planes.   FINDINGS: There is no CT evidence of acute fracture identified. No prevertebral soft tissue swelling is identified.   ALIGNMENT: There is minimal anterolisthesis of C3 on C4, of C6 on C7 and of C7 on T1 and mild retrolisthesis of C4 on C5.   VERTEBRAE/DISC SPACES: Moderate disc space narrowing and marginal osteophyte formation is seen at the C4-5 and C5-6 levels. Minimal discogenic degenerative changes are seen elsewhere in the cervical spine. Mild facet degenerative changes are seen throughout the cervical spine.   C2-3: There is no significant neural foraminal or central canal narrowing identified.   C3-4: There is no significant neural foraminal or central canal narrowing identified.   C4-5: Moderate neural foraminal narrowing is seen bilaterally. No significant central canal narrowing is seen.   C5-6: Mild neural foraminal narrowing is seen bilaterally. No significant central canal narrowing is seen.   C6-7: There is no significant neural foraminal or central canal narrowing identified.   C7-T1: There is no significant neural foraminal or central canal narrowing identified.   ADDITIONAL FINDINGS: Evaluation of the visualized soft tissues of the neck is limited by the lack of intravenous contrast.  Within this limitation, no gross mass or lymphadenopathy is identified. Significant atherosclerotic calcifications are seen in the carotid bulbs bilaterally.       1.  No CT evidence of acute fracture. 2.  Degenerative changes throughout the cervical spine, as above.   Signed by: Tj Brizuela 6/14/2024 1:19 PM Dictation workstation:    [id number]    CT head wo IV contrast    Result Date: 6/14/2024  Interpreted By:  Tj Brizuela, STUDY: CT HEAD WO IV CONTRAST; 6/14/2024 12:52 pm   INDICATION: Signs/Symptoms:fall.   COMPARISON: CT head dated 08/02/2021   ACCESSION NUMBER(S): [id number]   ORDERING CLINICIAN: CUONG CAMERON   TECHNIQUE: Contiguous axial CT images were obtained through the head at 5 mm slice thickness without contrast administration.   FINDINGS: INTRACRANIAL: Mild diffuse volume loss is seen bilaterally. Mild subcortical and periventricular white matter changes are seen.  The grey-white differentiation is intact. There is no mass effect or midline shift. There is no extraaxial fluid collection. There is no intracranial hemorrhage.  The calvarium  is unremarkable.   EXTRACRANIAL: Visualized paranasal sinuses and mastoids are clear.       1. Diffuse volume loss and periventricular white matter changes, most consistent with small vessel ischemic disease. 2. No evidence of acute cortical infarct or intracranial hemorrhage.     MACRO: None   Signed by: Tj Brizuela 6/14/2024 1:16 PM Dictation workstation:   [id number]    ECG 12 lead    Result Date: 6/14/2024  Sinus tachycardia Left atrial enlargement Borderline low voltage, extremity leads Probable LVH with secondary repol abnrm Prolonged QT interval Artifact in lead(s) II,V1,V2,V3,V4,V5,V6       Lab Results  Results for orders placed or performed during the hospital encounter of 06/14/24 (from the past 24 hour(s))   CBC and Auto Differential   Result Value Ref Range    WBC 14.5 (H) 4.4 - 11.3 x10*3/uL    nRBC 0.0 0.0 - 0.0 /100 WBCs    RBC 3.46 (L) 4.00 - 5.20 x10*6/uL    Hemoglobin 10.7 (L) 12.0 - 16.0 g/dL    Hematocrit 31.9 (L) 36.0 - 46.0 %    MCV 92 80 - 100 fL    MCH 30.9 26.0 - 34.0 pg    MCHC 33.5 32.0 - 36.0 g/dL    RDW 13.4 11.5 - 14.5 %    Platelets 254 150 - 450 x10*3/uL    Neutrophils % 82.8 40.0 - 80.0 %    Immature Granulocytes %, Automated 0.7 0.0 - 0.9 %     Lymphocytes % 5.9 13.0 - 44.0 %    Monocytes % 10.1 2.0 - 10.0 %    Eosinophils % 0.1 0.0 - 6.0 %    Basophils % 0.4 0.0 - 2.0 %    Neutrophils Absolute 12.02 (H) 1.60 - 5.50 x10*3/uL    Immature Granulocytes Absolute, Automated 0.10 0.00 - 0.50 x10*3/uL    Lymphocytes Absolute 0.86 0.80 - 3.00 x10*3/uL    Monocytes Absolute 1.47 (H) 0.05 - 0.80 x10*3/uL    Eosinophils Absolute 0.01 0.00 - 0.40 x10*3/uL    Basophils Absolute 0.06 0.00 - 0.10 x10*3/uL   Comprehensive metabolic panel   Result Value Ref Range    Glucose 281 (H) 74 - 99 mg/dL    Sodium 136 136 - 145 mmol/L    Potassium 5.0 3.5 - 5.3 mmol/L    Chloride 102 98 - 107 mmol/L    Bicarbonate 24 21 - 32 mmol/L    Anion Gap 15 10 - 20 mmol/L    Urea Nitrogen 29 (H) 6 - 23 mg/dL    Creatinine 0.77 0.50 - 1.05 mg/dL    eGFR 72 >60 mL/min/1.73m*2    Calcium 8.9 8.6 - 10.3 mg/dL    Albumin 4.1 3.4 - 5.0 g/dL    Alkaline Phosphatase 82 33 - 136 U/L    Total Protein 7.0 6.4 - 8.2 g/dL    AST 25 9 - 39 U/L    Bilirubin, Total 0.6 0.0 - 1.2 mg/dL    ALT 13 7 - 45 U/L   Protime-INR   Result Value Ref Range    Protime 12.4 9.8 - 12.8 seconds    INR 1.1 0.9 - 1.1   Cardiac Enzymes - CPK   Result Value Ref Range    Creatine Kinase 329 (H) 0 - 215 U/L   ECG 12 lead   Result Value Ref Range    Ventricular Rate 105 BPM    Atrial Rate 104 BPM    HI Interval 173 ms    QRS Duration 83 ms    QT Interval 397 ms    QTC Calculation(Bazett) 525 ms    P Axis 65 degrees    R Axis 10 degrees    QRS Count 17 beats    Q Onset 252 ms    T Offset 451 ms    QTC Fredericia 478 ms   Urinalysis with Reflex Culture and Microscopic   Result Value Ref Range    Color, Urine Light-Orange (N) Light-Yellow, Yellow, Dark-Yellow    Appearance, Urine Turbid (N) Clear    Specific Gravity, Urine 1.015 1.005 - 1.035    pH, Urine 5.5 5.0, 5.5, 6.0, 6.5, 7.0, 7.5, 8.0    Protein, Urine 200 (2+) (A) NEGATIVE, 10 (TRACE), 20 (TRACE) mg/dL    Glucose, Urine Normal Normal mg/dL    Blood, Urine 0.1 (1+) (A)  NEGATIVE    Ketones, Urine NEGATIVE NEGATIVE mg/dL    Bilirubin, Urine NEGATIVE NEGATIVE    Urobilinogen, Urine Normal Normal mg/dL    Nitrite, Urine NEGATIVE NEGATIVE    Leukocyte Esterase, Urine 250 Jojo/µL (A) NEGATIVE   Extra Urine Gray Tube   Result Value Ref Range    Extra Tube Hold for add-ons.    Microscopic Only, Urine   Result Value Ref Range    WBC, Urine 11-20 (A) 1-5, NONE /HPF    RBC, Urine 1-2 NONE, 1-2, 3-5 /HPF    Bacteria, Urine 4+ (A) NONE SEEN /HPF   POCT GLUCOSE   Result Value Ref Range    POCT Glucose 177 (H) 74 - 99 mg/dL   POCT GLUCOSE   Result Value Ref Range    POCT Glucose 126 (H) 74 - 99 mg/dL   Basic metabolic panel   Result Value Ref Range    Glucose 136 (H) 74 - 99 mg/dL    Sodium 138 136 - 145 mmol/L    Potassium 4.9 3.5 - 5.3 mmol/L    Chloride 106 98 - 107 mmol/L    Bicarbonate 23 21 - 32 mmol/L    Anion Gap 14 10 - 20 mmol/L    Urea Nitrogen 31 (H) 6 - 23 mg/dL    Creatinine 0.85 0.50 - 1.05 mg/dL    eGFR 64 >60 mL/min/1.73m*2    Calcium 8.4 (L) 8.6 - 10.3 mg/dL   CBC   Result Value Ref Range    WBC 10.0 4.4 - 11.3 x10*3/uL    nRBC 0.0 0.0 - 0.0 /100 WBCs    RBC 2.91 (L) 4.00 - 5.20 x10*6/uL    Hemoglobin 8.9 (L) 12.0 - 16.0 g/dL    Hematocrit 27.4 (L) 36.0 - 46.0 %    MCV 94 80 - 100 fL    MCH 30.6 26.0 - 34.0 pg    MCHC 32.5 32.0 - 36.0 g/dL    RDW 13.8 11.5 - 14.5 %    Platelets 192 150 - 450 x10*3/uL   Creatine Kinase   Result Value Ref Range    Creatine Kinase 653 (H) 0 - 215 U/L   POCT GLUCOSE   Result Value Ref Range    POCT Glucose 125 (H) 74 - 99 mg/dL        Medications  Scheduled medications:  atorvastatin, 40 mg, oral, Nightly  heparin (porcine), 5,000 Units, subcutaneous, q8h  insulin regular, 0-10 Units, subcutaneous, TID  levothyroxine, 112 mcg, oral, Daily  losartan, 100 mg, oral, Daily  pantoprazole, 40 mg, oral, Daily before breakfast   Or  pantoprazole, 40 mg, intravenous, Daily before breakfast  phenytoin ER, 100 mg, oral, TID  polyethylene glycol, 17 g, oral,  Daily      Continuous medications:     PRN medications:  PRN medications: bisacodyl, bisacodyl, calcium carbonate, dextrose, dextrose, glucagon, glucagon, HYDROmorphone, ondansetron ODT **OR** ondansetron     Physical Exam  Vitals reviewed.   Constitutional:       General: She is not in acute distress.  HENT:      Head: Normocephalic and atraumatic.      Right Ear: External ear normal.      Left Ear: External ear normal.      Nose: Nose normal.      Mouth/Throat:      Mouth: Mucous membranes are moist.      Pharynx: Oropharynx is clear.   Eyes:      Extraocular Movements: Extraocular movements intact.      Conjunctiva/sclera: Conjunctivae normal.      Pupils: Pupils are equal, round, and reactive to light.   Cardiovascular:      Rate and Rhythm: Normal rate and regular rhythm.      Pulses: Normal pulses.      Heart sounds: Normal heart sounds. No murmur heard.  Pulmonary:      Effort: Pulmonary effort is normal. No respiratory distress.      Breath sounds: Normal breath sounds. No wheezing, rhonchi or rales.   Chest:      Chest wall: No tenderness.   Abdominal:      General: Bowel sounds are normal. There is no distension.      Palpations: Abdomen is soft. There is no mass.      Tenderness: There is no abdominal tenderness. There is no rebound.   Musculoskeletal:         General: No swelling or deformity. Normal range of motion.      Cervical back: Normal range of motion.      Right lower leg: Edema present.      Left lower leg: No edema.   Skin:     General: Skin is warm and dry.      Capillary Refill: Capillary refill takes less than 2 seconds.   Neurological:      General: No focal deficit present.      Mental Status: She is alert and oriented to person, place, and time.   Psychiatric:         Mood and Affect: Mood normal.         Behavior: Behavior normal.                  Code Status  Full Code     Assessment/Plan        Principal Problem:    Closed nondisplaced fracture of greater trochanter of left femur,  initial encounter (Multi)     Status post fall with left greater trochanter fracture not extending   Patient did have CT of the pelvis, MRI of pelvis  Seen by ortho not felt to be surgical candidate  Will continue analgesics, PT evaluation  Dr Meza would like to see again 3 weeks to recheck,     Contusion to the left elbow  OT evaluation     UTI continue rocephin     HTN  Continue home medications     DM2  Cover w sliding scale insulin     Elevated CK slightly increased  Will continue hydration, recheck in the am again     Anemia,  slightly worse  Will check iron studies        No new Assessment & Plan notes have been filed under this hospital service since the last note was generated.  Service: Internal Medicine              DVT ppx:        Please see orders for more complete plan    Chet Pinto, APRN-CNP

## 2024-06-15 NOTE — PROGRESS NOTES
Physical Therapy    Physical Therapy Evaluation    Patient Name: Joseline Umana  MRN: [id number]  Today's Date: 6/15/2024        Assessment/Plan   PT Assessment  PT Assessment Results: Decreased strength, Decreased range of motion, Decreased endurance, Impaired balance, Decreased mobility, Impaired judgement, Decreased safety awareness  Rehab Prognosis: Fair  Evaluation/Treatment Tolerance: Patient limited by fatigue  End of Session Communication: Care Coordinator, Bedside nurse  End of Session Patient Position: Bed, 3 rail up, Alarm on  IP OR SWING BED PT PLAN  Inpatient or Swing Bed: Inpatient  PT Plan  Treatment/Interventions: Bed mobility, Transfer training, Gait training, Balance training, Strengthening, Range of motion, Endurance training, Therapeutic exercise, Therapeutic activity, Positioning, Neuromuscular re-education  PT Plan: Ongoing PT  PT Frequency: 5 times per week      Subjective   General Visit Information:  General  Reason for Referral: Impaired Mobility: fall with L femur fx., no surgical intervention  Referred By: Millie  Past Medical History Relevant to Rehab: Hx. of bilat. TKAs  Co-Treatment: OT  Co-Treatment Reason: to maximize safe mobility during functional assessment  Patient Position Received: Bed, 3 rail up, Alarm on  General Comment: Room 3302: pt has no c/o pain, she is cooperative throughout evaluation. Most concerned with return home to her dog.  Home Living:  Home Living  Type of Home: House  Lives With: Alone  Home Adaptive Equipment: None  Home Layout: One level  Bathroom Shower/Tub: Walk-in shower  Home Living Comments: 110 pound rescue dog at home; 2 daughters nearby that are retired.  Prior Level of Function:  Prior Function Per Pt/Caregiver Report  Level of [place]: Independent with homemaking with ambulation, Independent with ADLs and functional transfers  ADL Assistance: Independent  Homemaking Assistance: Independent  Ambulatory Assistance: Independent  Vocational:  "Retired  Prior Function Comments: cares for large dog, drives  Precautions:  Precautions  LE Weight Bearing Status: Weight Bearing as Tolerated  Medical Precautions: Fall precautions  Precautions Comment: \"greater trochanter precautions\" of no abduction of L hip  Vital Signs:       Objective   Pain:  Pain Assessment  Pain Assessment: 0-10  Pain Score: 0 - No pain (Per pt report, she winces and has intermittent pain with functional mobility and ambulation attempts)  Cognition:  Cognition  Overall Cognitive Status: Within Functional Limits  Orientation Level: Oriented X4  Processing Speed: Delayed    General Assessments:  General Observation  General Observation: Pt is quiet and hesitant to respond to commands, motivated and cooperative but despite difficulty with walking and wincing insists on minimal to no pain. She does not give clear responses when asking about her tolerance to activity.               Activity Tolerance  Endurance: Decreased tolerance for upright activites    Sensation  Light Touch: No apparent deficits    Strength  Strength Comments: RLE WFL; LLE limited by pain. L knee flex/ext 3+/5, L ankle 4/5  Strength  Strength Comments: RLE WFL; LLE limited by pain. L knee flex/ext 3+/5, L ankle 4/5           Coordination  Movements are Fluid and Coordinated: Yes  Functional Assessments:  Bed Mobility  Bed Mobility: Yes  Bed Mobility 1  Bed Mobility 1: Supine to sitting, Sitting to supine  Level of Assistance 1: Maximum verbal cues, Maximum assistance, +2    Transfers  Transfer: Yes  Transfer 1  Technique 1: Stand to sit, Sit to stand  Transfer Device 1: Walker  Transfer Level of Assistance 1: Minimum assistance, +2    Ambulation/Gait Training  Ambulation/Gait Training Performed: Yes  Ambulation/Gait Training 1  Device 1: Rolling walker  Assistance 1: Maximum verbal cues, Moderate assistance  Quality of Gait 1: Shuffling gait, Decreased step length, Antalgic (Pt has significant difficulty negotiating " walker, (B)Ue fatigue and (L) leg pain and weakness)  Comments/Distance (ft) 1: 2-3 steps (FWD/BWD)    Stairs  Stairs: No  Extremity/Trunk Assessments:  RLE   RLE : Within Functional Limits  LLE   LLE : Exceptions to WFL (Limited (L) hip due to recent fracture. Knee ext AROM lacking 30 deg)  Outcome Measures:  [facility] Basic Mobility  Turning from your back to your side while in a flat bed without using bedrails: A lot  Moving from lying on your back to sitting on the side of a flat bed without using bedrails: A lot  Moving to and from bed to chair (including a wheelchair): A lot  Standing up from a chair using your arms (e.g. wheelchair or bedside chair): A little  To walk in hospital room: Total  Climbing 3-5 steps with railing: Total  Basic Mobility - Total Score: 11    Encounter Problems       Encounter Problems (Active)       Mobility       STG - Patient will ambulate 20 ft with 2WW and Mod A (Progressing)       Start:  06/15/24    Expected End:  06/29/24               PT Transfers       STG - Patient to transfer to and from sit to supine Mod A x1 (Progressing)       Start:  06/15/24    Expected End:  06/29/24            STG - Patient will transfer sit to and from stand with Close S and FWW (Progressing)       Start:  06/15/24    Expected End:  06/29/24                   Education Documentation  Precautions, taught by Ritu Sarah, PT at 6/15/2024 12:22 PM.  Learner: Patient  Readiness: Acceptance  Method: Explanation  Response: Needs Reinforcement    Mobility Training, taught by Ritu Sarah, PT at 6/15/2024 12:22 PM.  Learner: Patient  Readiness: Acceptance  Method: Explanation  Response: Needs Reinforcement    Education Comments  No comments found.

## 2024-06-15 NOTE — CONSULTS
ORTHOPAEDIC SURGERY CONSULT NOTE    Reason For Consult  Left greater trochanter fracture    Chief Complaint:  Left hip pain    History Of Present Illness  Joseline Umana is a 89 y.o. female with a past medical history of presents after a fall.  Patient was reportedly down for several hours when she fell while taking out trash.  She lives independently and cares for her dog at home.  She is reporting left hip pain.  She does not report any significant prior groin pain and she does not ambulate with use of assistive devices.  She denies any new numbness, tingling or weakness left lower extremity.     Past Medical History  No past medical history on file.    Surgical History  Recent Surgeries in Orthopaedic Surgery            No cases to display             Social History  Social History     Socioeconomic History    Marital status:      Spouse name: Not on file    Number of children: Not on file    Years of education: Not on file    Highest education level: Not on file   Occupational History    Not on file   Tobacco Use    Smoking status: Never    Smokeless tobacco: Never   Substance and Sexual Activity    Alcohol use: Not on file    Drug use: Not on file    Sexual activity: Not on file   Other Topics Concern    Not on file   Social History Narrative    Not on file     Social Determinants of Health     Financial Resource Strain: Low Risk  (6/14/2024)    Overall Financial Resource Strain (CARDIA)     Difficulty of Paying Living Expenses: Not very hard   Food Insecurity: Not on file   Transportation Needs: No Transportation Needs (6/14/2024)    PRAPARE - Transportation     Lack of Transportation (Medical): No     Lack of Transportation (Non-Medical): No   Physical Activity: Not on file   Stress: Not on file   Social Connections: Not on file   Intimate Partner Violence: Not on file   Housing Stability: Low Risk  (6/14/2024)    Housing Stability Vital Sign     Unable to Pay for Housing in the Last Year: No      Number of Places Lived in the Last Year: 1     Unstable Housing in the Last Year: No       Family History  No family history on file.     Allergies  Allergies   Allergen Reactions    Codeine Rash       Review of Systems  REVIEW OF SYSTEMS  Constitutional: no unplanned weight loss  Psychiatric: no suicidal ideation  ENT: no vision changes, no sinus problems  Pulmonary: no shortness of breath  Lymphatic: no enlarged lymph nodes  Cardiovascular: no chest pain or shortness of breath  Gastrointestinal: no stomach problems  Genitourinary: no dysuria   Skin: no rashes  Endocrine: no thyroid problems  Neurological: no headache, no numbness  Hematological: no easy bruising  Musculoskeletal: Left hip pain     Physical Exam  PHYSICAL EXAMINATION  Constitutional Exam: well developed and well nourished  Psychiatric Exam: alert and oriented, appropriate mood and behavior  Eye Exam: EOMI  Pulmonary Exam: breathing non-labored, no apparent distress  Lymphatic exam: no appreciable lymphadenopathy in the lower extremities  Cardiovascular exam: RRR to peripheral palpation, DP pulses 2+, PT 2+, toes are pink with good capillary refill, no pitting edema  Skin exam: no open lesions, rashes, abrasions or ulcerations  Neurological exam: sensation to light touch intact in both lower extremities in peripheral and dermatomal distributions (except for any abnormalities noted in musculoskeletal exam)    Musculoskeletal exam: Left lower extremity examination.  Patient pain localized to the proximal thigh, she is focally tender to palpation directly along the greater trochanter.  Hip ROM deferred secondary to concern for unstable fracture. Patient has sensation intact to light touch grossly in a saphenous, sural, superficial peroneal, deep peroneal and tibial nerve distribution.  Patient has 5 out of 5 strength in plantarflexion, dorsiflexion and EHL. Patient has 2+ DP/PT pulse palpated.     Last Recorded Vitals  Blood pressure 167/74, pulse 86,  "temperature (!) 37.9 °C (100.3 °F), temperature source Temporal, resp. rate 20, height 1.549 m (5' 1\"), weight (!) 41.2 kg (90 lb 13.3 oz), SpO2 99%.    Laboratory Results  Results for orders placed or performed during the hospital encounter of 06/14/24 (from the past 24 hour(s))   CBC and Auto Differential   Result Value Ref Range    WBC 14.5 (H) 4.4 - 11.3 x10*3/uL    nRBC 0.0 0.0 - 0.0 /100 WBCs    RBC 3.46 (L) 4.00 - 5.20 x10*6/uL    Hemoglobin 10.7 (L) 12.0 - 16.0 g/dL    Hematocrit 31.9 (L) 36.0 - 46.0 %    MCV 92 80 - 100 fL    MCH 30.9 26.0 - 34.0 pg    MCHC 33.5 32.0 - 36.0 g/dL    RDW 13.4 11.5 - 14.5 %    Platelets 254 150 - 450 x10*3/uL    Neutrophils % 82.8 40.0 - 80.0 %    Immature Granulocytes %, Automated 0.7 0.0 - 0.9 %    Lymphocytes % 5.9 13.0 - 44.0 %    Monocytes % 10.1 2.0 - 10.0 %    Eosinophils % 0.1 0.0 - 6.0 %    Basophils % 0.4 0.0 - 2.0 %    Neutrophils Absolute 12.02 (H) 1.60 - 5.50 x10*3/uL    Immature Granulocytes Absolute, Automated 0.10 0.00 - 0.50 x10*3/uL    Lymphocytes Absolute 0.86 0.80 - 3.00 x10*3/uL    Monocytes Absolute 1.47 (H) 0.05 - 0.80 x10*3/uL    Eosinophils Absolute 0.01 0.00 - 0.40 x10*3/uL    Basophils Absolute 0.06 0.00 - 0.10 x10*3/uL   Comprehensive metabolic panel   Result Value Ref Range    Glucose 281 (H) 74 - 99 mg/dL    Sodium 136 136 - 145 mmol/L    Potassium 5.0 3.5 - 5.3 mmol/L    Chloride 102 98 - 107 mmol/L    Bicarbonate 24 21 - 32 mmol/L    Anion Gap 15 10 - 20 mmol/L    Urea Nitrogen 29 (H) 6 - 23 mg/dL    Creatinine 0.77 0.50 - 1.05 mg/dL    eGFR 72 >60 mL/min/1.73m*2    Calcium 8.9 8.6 - 10.3 mg/dL    Albumin 4.1 3.4 - 5.0 g/dL    Alkaline Phosphatase 82 33 - 136 U/L    Total Protein 7.0 6.4 - 8.2 g/dL    AST 25 9 - 39 U/L    Bilirubin, Total 0.6 0.0 - 1.2 mg/dL    ALT 13 7 - 45 U/L   Protime-INR   Result Value Ref Range    Protime 12.4 9.8 - 12.8 seconds    INR 1.1 0.9 - 1.1   Cardiac Enzymes - CPK   Result Value Ref Range    Creatine Kinase 329 " (H) 0 - 215 U/L   ECG 12 lead   Result Value Ref Range    Ventricular Rate 105 BPM    Atrial Rate 104 BPM    NV Interval 173 ms    QRS Duration 83 ms    QT Interval 397 ms    QTC Calculation(Bazett) 525 ms    P Axis 65 degrees    R Axis 10 degrees    QRS Count 17 beats    Q Onset 252 ms    T Offset 451 ms    QTC Fredericia 478 ms   Urinalysis with Reflex Culture and Microscopic   Result Value Ref Range    Color, Urine Light-Orange (N) Light-Yellow, Yellow, Dark-Yellow    Appearance, Urine Turbid (N) Clear    Specific Gravity, Urine 1.015 1.005 - 1.035    pH, Urine 5.5 5.0, 5.5, 6.0, 6.5, 7.0, 7.5, 8.0    Protein, Urine 200 (2+) (A) NEGATIVE, 10 (TRACE), 20 (TRACE) mg/dL    Glucose, Urine Normal Normal mg/dL    Blood, Urine 0.1 (1+) (A) NEGATIVE    Ketones, Urine NEGATIVE NEGATIVE mg/dL    Bilirubin, Urine NEGATIVE NEGATIVE    Urobilinogen, Urine Normal Normal mg/dL    Nitrite, Urine NEGATIVE NEGATIVE    Leukocyte Esterase, Urine 250 Jojo/µL (A) NEGATIVE   Microscopic Only, Urine   Result Value Ref Range    WBC, Urine 11-20 (A) 1-5, NONE /HPF    RBC, Urine 1-2 NONE, 1-2, 3-5 /HPF    Bacteria, Urine 4+ (A) NONE SEEN /HPF   POCT GLUCOSE   Result Value Ref Range    POCT Glucose 177 (H) 74 - 99 mg/dL   POCT GLUCOSE   Result Value Ref Range    POCT Glucose 126 (H) 74 - 99 mg/dL        Radiology Results  X-ray left hip with pelvis, CT pelvis and MRI left hip without contrast reviewed from 06/14/2024 and independently evaluated by me demonstrates comminuted left greater trochanteric femur fracture without obvious extension to the lesser trochanter by MRI review.    Assessment/Plan:  89-year-old female who my impression has a left isolated greater trochanteric femur fracture without extension to the lesser trochanter by MRI review.    - No acute orthopedic surgery intervention  - WBAT LLE with greater trochanter precautions and wheeled walker  - PO Pain control, IV for breakthrough  - Encourage IS, supplemental O2 as  needed  - DVT PPX: SCDs, ALL hose and recommend chemical DVT at the discretion of the primary team, may consider aspirin  - PT/OT  - RTC in 3 weeks for x-ray imaging left hip with pelvis weight bearing    Aravind Meza MD, EVANS  Department of Orthopaedic Surgery  [facility]    The diagnosis and treatment plan were reviewed with the patient. All questions were answered. The patient verbalized understanding of the treatment plan. There were no barriers to understanding identified.    Note dictated with [employer] software.  Completed without full type editing and sent to avoid delay.

## 2024-06-15 NOTE — CARE PLAN
Problem: Skin  Goal: Decreased wound size/increased tissue granulation at next dressing change  Outcome: Progressing  Goal: Participates in plan/prevention/treatment measures  Outcome: Progressing  Goal: Prevent/manage excess moisture  Outcome: Progressing  Goal: Prevent/minimize sheer/friction injuries  Outcome: Progressing  Goal: Promote/optimize nutrition  Outcome: Progressing  Goal: Promote skin healing  6/15/2024 0258 by Mira Adkins RN  Outcome: Progressing  6/15/2024 0258 by Mira Adkins RN  Flowsheets (Taken 6/15/2024 0258)  Promote skin healing: Turn/reposition every 2 hours/use positioning/transfer devices   The patient's goals for the shift include  pt will remain safe from injury    The clinical goals for the shift include pt will remain safe throughout my shift

## 2024-06-16 ENCOUNTER — APPOINTMENT (OUTPATIENT)
Dept: RADIOLOGY | Facility: HOSPITAL | Age: 89
DRG: 536 | End: 2024-06-16
Payer: MEDICARE

## 2024-06-16 VITALS
DIASTOLIC BLOOD PRESSURE: 53 MMHG | SYSTOLIC BLOOD PRESSURE: 159 MMHG | HEART RATE: 99 BPM | OXYGEN SATURATION: 98 % | HEIGHT: 61 IN | WEIGHT: 90.83 LBS | BODY MASS INDEX: 17.15 KG/M2 | RESPIRATION RATE: 18 BRPM | TEMPERATURE: 99.9 F

## 2024-06-16 LAB
ALBUMIN SERPL BCP-MCNC: 3.5 G/DL (ref 3.4–5)
ALP SERPL-CCNC: 58 U/L (ref 33–136)
ALT SERPL W P-5'-P-CCNC: 10 U/L (ref 7–45)
ANION GAP SERPL CALC-SCNC: 22 MMOL/L (ref 10–20)
AST SERPL W P-5'-P-CCNC: 22 U/L (ref 9–39)
BACTERIA BLD CULT: NORMAL
BACTERIA BLD CULT: NORMAL
BACTERIA UR CULT: ABNORMAL
BASOPHILS # BLD AUTO: 0.05 X10*3/UL (ref 0–0.1)
BASOPHILS NFR BLD AUTO: 0.4 %
BILIRUB DIRECT SERPL-MCNC: 0.1 MG/DL (ref 0–0.3)
BILIRUB SERPL-MCNC: 0.5 MG/DL (ref 0–1.2)
BUN SERPL-MCNC: 27 MG/DL (ref 6–23)
CALCIUM SERPL-MCNC: 8.8 MG/DL (ref 8.6–10.3)
CHLORIDE SERPL-SCNC: 101 MMOL/L (ref 98–107)
CK SERPL-CCNC: 444 U/L (ref 0–215)
CO2 SERPL-SCNC: 17 MMOL/L (ref 21–32)
CREAT SERPL-MCNC: 0.78 MG/DL (ref 0.5–1.05)
EGFRCR SERPLBLD CKD-EPI 2021: 71 ML/MIN/1.73M*2
EOSINOPHIL # BLD AUTO: 0 X10*3/UL (ref 0–0.4)
EOSINOPHIL NFR BLD AUTO: 0 %
ERYTHROCYTE [DISTWIDTH] IN BLOOD BY AUTOMATED COUNT: 13.6 % (ref 11.5–14.5)
GLUCOSE BLD MANUAL STRIP-MCNC: 133 MG/DL (ref 74–99)
GLUCOSE BLD MANUAL STRIP-MCNC: 152 MG/DL (ref 74–99)
GLUCOSE BLD MANUAL STRIP-MCNC: 164 MG/DL (ref 74–99)
GLUCOSE BLD MANUAL STRIP-MCNC: 193 MG/DL (ref 74–99)
GLUCOSE SERPL-MCNC: 156 MG/DL (ref 74–99)
HCT VFR BLD AUTO: 28.3 % (ref 36–46)
HGB BLD-MCNC: 9.3 G/DL (ref 12–16)
IMM GRANULOCYTES # BLD AUTO: 0.09 X10*3/UL (ref 0–0.5)
IMM GRANULOCYTES NFR BLD AUTO: 0.7 % (ref 0–0.9)
LACTATE SERPL-SCNC: 0.9 MMOL/L (ref 0.4–2)
LYMPHOCYTES # BLD AUTO: 0.78 X10*3/UL (ref 0.8–3)
LYMPHOCYTES NFR BLD AUTO: 5.8 %
MAGNESIUM SERPL-MCNC: 1.63 MG/DL (ref 1.6–2.4)
MCH RBC QN AUTO: 30.8 PG (ref 26–34)
MCHC RBC AUTO-ENTMCNC: 32.9 G/DL (ref 32–36)
MCV RBC AUTO: 94 FL (ref 80–100)
MONOCYTES # BLD AUTO: 1.49 X10*3/UL (ref 0.05–0.8)
MONOCYTES NFR BLD AUTO: 11.1 %
NEUTROPHILS # BLD AUTO: 10.96 X10*3/UL (ref 1.6–5.5)
NEUTROPHILS NFR BLD AUTO: 82 %
NRBC BLD-RTO: 0 /100 WBCS (ref 0–0)
PLATELET # BLD AUTO: 202 X10*3/UL (ref 150–450)
POTASSIUM SERPL-SCNC: 5.1 MMOL/L (ref 3.5–5.3)
PROT SERPL-MCNC: 6.4 G/DL (ref 6.4–8.2)
RBC # BLD AUTO: 3.02 X10*6/UL (ref 4–5.2)
SODIUM SERPL-SCNC: 135 MMOL/L (ref 136–145)
WBC # BLD AUTO: 13.4 X10*3/UL (ref 4.4–11.3)

## 2024-06-16 PROCEDURE — 2500000001 HC RX 250 WO HCPCS SELF ADMINISTERED DRUGS (ALT 637 FOR MEDICARE OP): Performed by: NURSE PRACTITIONER

## 2024-06-16 PROCEDURE — 1210000001 HC SEMI-PRIVATE ROOM DAILY

## 2024-06-16 PROCEDURE — 85025 COMPLETE CBC W/AUTO DIFF WBC: CPT | Performed by: NURSE PRACTITIONER

## 2024-06-16 PROCEDURE — 71046 X-RAY EXAM CHEST 2 VIEWS: CPT

## 2024-06-16 PROCEDURE — 2500000002 HC RX 250 W HCPCS SELF ADMINISTERED DRUGS (ALT 637 FOR MEDICARE OP, ALT 636 FOR OP/ED): Performed by: NURSE PRACTITIONER

## 2024-06-16 PROCEDURE — 82947 ASSAY GLUCOSE BLOOD QUANT: CPT

## 2024-06-16 PROCEDURE — 99233 SBSQ HOSP IP/OBS HIGH 50: CPT | Performed by: NURSE PRACTITIONER

## 2024-06-16 PROCEDURE — 2500000004 HC RX 250 GENERAL PHARMACY W/ HCPCS (ALT 636 FOR OP/ED): Performed by: NURSE PRACTITIONER

## 2024-06-16 PROCEDURE — 36415 COLL VENOUS BLD VENIPUNCTURE: CPT | Performed by: NURSE PRACTITIONER

## 2024-06-16 PROCEDURE — 83735 ASSAY OF MAGNESIUM: CPT | Performed by: NURSE PRACTITIONER

## 2024-06-16 PROCEDURE — C9113 INJ PANTOPRAZOLE SODIUM, VIA: HCPCS | Performed by: NURSE PRACTITIONER

## 2024-06-16 PROCEDURE — 82550 ASSAY OF CK (CPK): CPT | Performed by: NURSE PRACTITIONER

## 2024-06-16 PROCEDURE — 87040 BLOOD CULTURE FOR BACTERIA: CPT | Mod: PORLAB | Performed by: NURSE PRACTITIONER

## 2024-06-16 PROCEDURE — 82565 ASSAY OF CREATININE: CPT | Performed by: NURSE PRACTITIONER

## 2024-06-16 PROCEDURE — 84075 ASSAY ALKALINE PHOSPHATASE: CPT | Performed by: NURSE PRACTITIONER

## 2024-06-16 PROCEDURE — 83605 ASSAY OF LACTIC ACID: CPT | Performed by: NURSE PRACTITIONER

## 2024-06-16 RX ORDER — AMLODIPINE BESYLATE 10 MG/1
10 TABLET ORAL DAILY
Status: DISCONTINUED | OUTPATIENT
Start: 2024-06-16 | End: 2024-06-18 | Stop reason: HOSPADM

## 2024-06-16 RX ORDER — CEFTRIAXONE 1 G/50ML
1 INJECTION, SOLUTION INTRAVENOUS EVERY 24 HOURS
Status: DISCONTINUED | OUTPATIENT
Start: 2024-06-16 | End: 2024-06-18 | Stop reason: HOSPADM

## 2024-06-16 RX ORDER — SODIUM CHLORIDE, SODIUM LACTATE, POTASSIUM CHLORIDE, CALCIUM CHLORIDE 600; 310; 30; 20 MG/100ML; MG/100ML; MG/100ML; MG/100ML
75 INJECTION, SOLUTION INTRAVENOUS CONTINUOUS
Status: ACTIVE | OUTPATIENT
Start: 2024-06-16 | End: 2024-06-17

## 2024-06-16 ASSESSMENT — COGNITIVE AND FUNCTIONAL STATUS - GENERAL
DAILY ACTIVITIY SCORE: 14
TOILETING: TOTAL
DRESSING REGULAR LOWER BODY CLOTHING: TOTAL
CLIMB 3 TO 5 STEPS WITH RAILING: TOTAL
STANDING UP FROM CHAIR USING ARMS: A LITTLE
MOBILITY SCORE: 11
STANDING UP FROM CHAIR USING ARMS: A LITTLE
MOVING TO AND FROM BED TO CHAIR: A LOT
DRESSING REGULAR UPPER BODY CLOTHING: A LITTLE
HELP NEEDED FOR BATHING: TOTAL
MOVING FROM LYING ON BACK TO SITTING ON SIDE OF FLAT BED WITH BEDRAILS: A LOT
TURNING FROM BACK TO SIDE WHILE IN FLAT BAD: A LOT
MOBILITY SCORE: 11
CLIMB 3 TO 5 STEPS WITH RAILING: TOTAL
DRESSING REGULAR LOWER BODY CLOTHING: TOTAL
TURNING FROM BACK TO SIDE WHILE IN FLAT BAD: A LOT
MOVING TO AND FROM BED TO CHAIR: A LOT
HELP NEEDED FOR BATHING: TOTAL
DAILY ACTIVITIY SCORE: 14
MOVING FROM LYING ON BACK TO SITTING ON SIDE OF FLAT BED WITH BEDRAILS: A LOT
WALKING IN HOSPITAL ROOM: TOTAL
DRESSING REGULAR UPPER BODY CLOTHING: A LITTLE
WALKING IN HOSPITAL ROOM: TOTAL
TOILETING: TOTAL

## 2024-06-16 ASSESSMENT — ENCOUNTER SYMPTOMS
ABDOMINAL PAIN: 0
DYSURIA: 0
CHILLS: 0
FREQUENCY: 0
FLANK PAIN: 0
FEVER: 0
SHORTNESS OF BREATH: 0
ABDOMINAL DISTENTION: 0
NAUSEA: 0

## 2024-06-16 ASSESSMENT — PAIN - FUNCTIONAL ASSESSMENT: PAIN_FUNCTIONAL_ASSESSMENT: 0-10

## 2024-06-16 ASSESSMENT — PAIN SCALES - GENERAL: PAINLEVEL_OUTOF10: 0 - NO PAIN

## 2024-06-16 NOTE — PROGRESS NOTES
Joseline Umana is a 89 y.o. female on day 2 of admission presenting with Closed nondisplaced fracture of greater trochanter of left femur, initial encounter (Multi).      Subjective   Joseline Umana is a 89 y.o. female with PMHx s/f HTN,  presenting with back pain after a fall. Pt lives a lone but has daughters nearby that check on her frequently. Evidently she lost her balance throwing a bag of trash into a can. She fell to the ground injuring her back and was unable to get off the ground, she was found half a day later and brought to the hospital by ambulance.  She denies loss of consciousness, cp, palpitations, near syncope, fever chills. On arrival to the ED pt had T 98,  RR18 and bp 140/92, heart rate improved with analgesia. Pt found to have UTI given 1 G of rocephin.  CBC showing wbc 14.5 Hbg 10.7, platelets 254.  Glucose 281 BUN 29 cratinin is normal ck 329.  CT of the head was negative for intracranial hemorrhage stroke mass or e or acute finding.  CT of the cervical spine was negative for any acute fracture there are degenerative changes noted however.  Shoulder and elbow present without obvious fracture x-ray of the left hip and pelvis appears to show a nondisplaced fracture through the left femoral greater trochanter.  The ED physician discussed these findings with orthopedic surgery on-call who advised that at this point it does not appear to be a surgical case however he also advised to get a CT scan of the of the pelvis, patient is to be admitted to continue treating her urinary tract infection control her pain mobilize the patient with physical therapy and determine a safe disposition for her recuperation.     6/15/24: No pain at rest, CK slightly more elevated will plan to continue hydration. Anemia also worsened could be dilutional or loss from fracture, will check iron studies. Seen by Ortho, no plans for surgery. Will attempt to mobilize and evaluate pain. Will need long term vte  prophylaxis.     06/16/24 Pt with tachycardia, elevated bp, leukocytosis. Will activate Sepsis alert. Continue treatment for UTI, check blood cultures, LFT, lactate, Give a little extra fluid to improve urine output. Will also recheck CXR pt could have pneumonia not seen at admission due to poor volume status. Will also check blood cultures. Pt wanted to go home. CK now trending down, anemia appears stable.        Review of Systems   Constitutional:  Negative for chills and fever.   Respiratory:  Negative for shortness of breath.    Cardiovascular:  Negative for chest pain.   Gastrointestinal:  Negative for abdominal distention, abdominal pain and nausea.   Genitourinary:  Negative for dysuria, flank pain and frequency.          Objective     Last Recorded Vitals  BP (!) 185/77 (BP Location: Right arm, Patient Position: Lying)   Pulse (!) 112   Temp 35.8 °C (96.5 °F) (Temporal)   Resp 18   Wt (!) 41.2 kg (90 lb 13.3 oz)   SpO2 99%     Image Results  MR hip left wo IV contrast    Result Date: 6/15/2024  Interpreted By:  Finkelstein, Evan, STUDY: MR HIP LEFT WO IV CONTRAST;  6/14/2024 9:46 pm   INDICATION: Signs/Symptoms:L greater trochanteric femur fracture r/o less trochanteric extension.   COMPARISON: CT pelvis 06/14/2025   ACCESSION NUMBER(S): [id number]   ORDERING CLINICIAN: KATHLEEN CHERY   TECHNIQUE: Multiplanar, multisequence MR imaging of the left hip and pelvis without contrast.   FINDINGS: Acute fracture involving the greater trochanter of the left femur. No definite extension to the lesser trochanter or the femoral neck or head. The gluteus saida tendons are intact. There is fluid signal at the insertion of the gluteus medius tendon suggesting partial tear. There is edema throughout the gluteus medius and minimus musculature as well as the visualized muscles in the anterior compartment of the proximal thigh and adductor musculature compatible with muscle strain. Asymmetric small left  femoroacetabular joint effusion.       Acute fracture involving the greater trochanter of the left femur. No definite extension to the lesser trochanter, femoral neck or head. Fluid signal at the insertion of the gluteus medius tendon suggesting partial tear. Edema throughout the gluteus medius and minimus musculature as well as the visualized muscles in the anterior compartment of the proximal thigh and adductor musculature compatible with muscle strain.     MACRO: None.   Signed by: Evan Finkelstein 6/15/2024 2:34 AM Dictation workstation:   [id number]    CT pelvis wo IV contrast    Result Date: 6/14/2024  STUDY: CT PELVIS without CONTRAST; 06/14/2024, 3:30 PM. INDICATION:  Left hip pain. Proximal femur fracture. COMPARISON: Not available. ACCESSION NUMBER(S): [id number] ORDERING CLINICIAN: CUONG CAMERON TECHNIQUE:  Thin section axial images were obtained through the pelvis without intravenous contrast.  Orthogonal reconstructed images were obtained and reviewed.  Automated mA/kV exposure control was utilized and patient examination was performed in strict accordance with principles of ALARA. FINDINGS: There is comminuted fractures involving the greater trochanter of the left femur.  No fractures are seen within the left femoral head.  No fracture is seen within the iliac wings, pubic rami, sacrum, or coccyx.  There is edema seen involving the distal aspect of the left iliopsoas muscle.  No prominent joint effusion is noted.  There is a fecal impaction.  No enlarged lymph as seen in the iliac chains.    1.  Comminuted fractures of the greater trochanter of the left femur, with prominent edema involving the distal left iliopsoas muscle. 2.  Fecal impaction. Signed by Evin Freeman MD    XR hip left with pelvis when performed 2 or 3 views    Result Date: 6/14/2024  Interpreted By:  Kevon Lyn, STUDY: XR HIP LEFT WITH PELVIS WHEN PERFORMED 2 OR 3 VIEWS;  6/14/2024 1:23 pm   INDICATION:  Signs/Symptoms:fall.   COMPARISON: None.   ACCESSION NUMBER(S): [id number]   ORDERING CLINICIAN: CUONG CAMERON   FINDINGS: Exam limited due to underexposure as well as overlying external devices. Nondisplaced fracture through the left femoral greater trochanter. No definite pelvic fracture identified otherwise. Moderate degenerative changes of the hips.       Nondisplaced fracture through the left femoral greater trochanter.   MACRO: None   Signed by: Kevon Lyn 6/14/2024 1:54 PM Dictation workstation:   [id number]    XR elbow left 3+ views    Result Date: 6/14/2024  Interpreted By:  Kevon Lyn, STUDY: XR ELBOW LEFT 3+ VIEWS;  6/14/2024 1:23 pm   INDICATION: Signs/Symptoms:fall.   COMPARISON: None.   ACCESSION NUMBER(S): [id number]   ORDERING CLINICIAN: CUONG CAMERON   FINDINGS: No acute fracture or dislocation. No definite joint effusion. Small osteophytes indicate degenerative changes.       No acute osseous findings of the left elbow.   MACRO: None   Signed by: Kevon Lyn 6/14/2024 1:52 PM Dictation workstation:   [id number]    XR shoulder left 2+ views    Result Date: 6/14/2024  Interpreted By:  Layo Garcia, STUDY: XR SHOULDER LEFT 2+ VIEWS; ;  6/14/2024 1:23 pm   INDICATION: Signs/Symptoms:fall.   COMPARISON: None.   ACCESSION NUMBER(S): [id number]   ORDERING CLINICIAN: CUONG CAMERON   FINDINGS: Loss of the acromial humeral interval with high riding of the humeral head with respect to the glenoid fossa favors chronic rotator cuff pathology. There is calcific tendinosis of the rotator cuff. AC joint is normal.       Stigmata of chronic rotator cuff pathology with advanced arthritis of the glenohumeral joint. No acute osseous abnormality     MACRO: None   Signed by: Layo Garcia 6/14/2024 1:41 PM Dictation workstation:   [id number]    CT cervical spine wo IV contrast    Result Date: 6/14/2024  Interpreted By:  Tj Brizuela, STUDY: CT CERVICAL SPINE WO IV CONTRAST; 6/14/2024  12:52 pm   INDICATION: Signs/Symptoms:fall.   COMPARISON: None.   ACCESSION NUMBER(S): [id number]   ORDERING CLINICIAN: CUONG CAMERON   TECHNIQUE: Contiguous axial CT images were obtained at  2 mm slice thickness through the cervical spine without contrast administration. The images were then reconstructed in the coronal and sagittal planes.   FINDINGS: There is no CT evidence of acute fracture identified. No prevertebral soft tissue swelling is identified.   ALIGNMENT: There is minimal anterolisthesis of C3 on C4, of C6 on C7 and of C7 on T1 and mild retrolisthesis of C4 on C5.   VERTEBRAE/DISC SPACES: Moderate disc space narrowing and marginal osteophyte formation is seen at the C4-5 and C5-6 levels. Minimal discogenic degenerative changes are seen elsewhere in the cervical spine. Mild facet degenerative changes are seen throughout the cervical spine.   C2-3: There is no significant neural foraminal or central canal narrowing identified.   C3-4: There is no significant neural foraminal or central canal narrowing identified.   C4-5: Moderate neural foraminal narrowing is seen bilaterally. No significant central canal narrowing is seen.   C5-6: Mild neural foraminal narrowing is seen bilaterally. No significant central canal narrowing is seen.   C6-7: There is no significant neural foraminal or central canal narrowing identified.   C7-T1: There is no significant neural foraminal or central canal narrowing identified.   ADDITIONAL FINDINGS: Evaluation of the visualized soft tissues of the neck is limited by the lack of intravenous contrast.  Within this limitation, no gross mass or lymphadenopathy is identified. Significant atherosclerotic calcifications are seen in the carotid bulbs bilaterally.       1.  No CT evidence of acute fracture. 2.  Degenerative changes throughout the cervical spine, as above.   Signed by: Tj Brizuela 6/14/2024 1:19 PM Dictation workstation:   [id number]    CT head wo IV  contrast    Result Date: 6/14/2024  Interpreted By:  Tj Brizuela, STUDY: CT HEAD WO IV CONTRAST; 6/14/2024 12:52 pm   INDICATION: Signs/Symptoms:fall.   COMPARISON: CT head dated 08/02/2021   ACCESSION NUMBER(S): [id number]   ORDERING CLINICIAN: CUONG CAMERON   TECHNIQUE: Contiguous axial CT images were obtained through the head at 5 mm slice thickness without contrast administration.   FINDINGS: INTRACRANIAL: Mild diffuse volume loss is seen bilaterally. Mild subcortical and periventricular white matter changes are seen.  The grey-white differentiation is intact. There is no mass effect or midline shift. There is no extraaxial fluid collection. There is no intracranial hemorrhage.  The calvarium  is unremarkable.   EXTRACRANIAL: Visualized paranasal sinuses and mastoids are clear.       1. Diffuse volume loss and periventricular white matter changes, most consistent with small vessel ischemic disease. 2. No evidence of acute cortical infarct or intracranial hemorrhage.     MACRO: None   Signed by: Tj Brizuela 6/14/2024 1:16 PM Dictation workstation:   [id number]    ECG 12 lead    Result Date: 6/14/2024  Sinus tachycardia Left atrial enlargement Borderline low voltage, extremity leads Probable LVH with secondary repol abnrm Prolonged QT interval Artifact in lead(s) II,V1,V2,V3,V4,V5,V6       Lab Results  Results for orders placed or performed during the hospital encounter of 06/14/24 (from the past 24 hour(s))   POCT GLUCOSE   Result Value Ref Range    POCT Glucose 139 (H) 74 - 99 mg/dL   POCT GLUCOSE   Result Value Ref Range    POCT Glucose 130 (H) 74 - 99 mg/dL   POCT GLUCOSE   Result Value Ref Range    POCT Glucose 134 (H) 74 - 99 mg/dL   POCT GLUCOSE   Result Value Ref Range    POCT Glucose 164 (H) 74 - 99 mg/dL   Basic Metabolic Panel   Result Value Ref Range    Glucose 156 (H) 74 - 99 mg/dL    Sodium 135 (L) 136 - 145 mmol/L    Potassium 5.1 3.5 - 5.3 mmol/L    Chloride 101 98 - 107 mmol/L     Bicarbonate 17 (L) 21 - 32 mmol/L    Anion Gap 22 (H) 10 - 20 mmol/L    Urea Nitrogen 27 (H) 6 - 23 mg/dL    Creatinine 0.78 0.50 - 1.05 mg/dL    eGFR 71 >60 mL/min/1.73m*2    Calcium 8.8 8.6 - 10.3 mg/dL   CBC and Auto Differential   Result Value Ref Range    WBC 13.4 (H) 4.4 - 11.3 x10*3/uL    nRBC 0.0 0.0 - 0.0 /100 WBCs    RBC 3.02 (L) 4.00 - 5.20 x10*6/uL    Hemoglobin 9.3 (L) 12.0 - 16.0 g/dL    Hematocrit 28.3 (L) 36.0 - 46.0 %    MCV 94 80 - 100 fL    MCH 30.8 26.0 - 34.0 pg    MCHC 32.9 32.0 - 36.0 g/dL    RDW 13.6 11.5 - 14.5 %    Platelets 202 150 - 450 x10*3/uL    Neutrophils % 82.0 40.0 - 80.0 %    Immature Granulocytes %, Automated 0.7 0.0 - 0.9 %    Lymphocytes % 5.8 13.0 - 44.0 %    Monocytes % 11.1 2.0 - 10.0 %    Eosinophils % 0.0 0.0 - 6.0 %    Basophils % 0.4 0.0 - 2.0 %    Neutrophils Absolute 10.96 (H) 1.60 - 5.50 x10*3/uL    Immature Granulocytes Absolute, Automated 0.09 0.00 - 0.50 x10*3/uL    Lymphocytes Absolute 0.78 (L) 0.80 - 3.00 x10*3/uL    Monocytes Absolute 1.49 (H) 0.05 - 0.80 x10*3/uL    Eosinophils Absolute 0.00 0.00 - 0.40 x10*3/uL    Basophils Absolute 0.05 0.00 - 0.10 x10*3/uL   Magnesium   Result Value Ref Range    Magnesium 1.63 1.60 - 2.40 mg/dL   Creatine Kinase   Result Value Ref Range    Creatine Kinase 444 (H) 0 - 215 U/L        Medications  Scheduled medications:  amLODIPine, 5 mg, oral, Daily  atorvastatin, 40 mg, oral, Nightly  cefTRIAXone, 1 g, intravenous, q24h  heparin (porcine), 5,000 Units, subcutaneous, q8h  insulin regular, 0-10 Units, subcutaneous, TID  levothyroxine, 112 mcg, oral, Daily  losartan, 100 mg, oral, Daily  pantoprazole, 40 mg, oral, Daily before breakfast   Or  pantoprazole, 40 mg, intravenous, Daily before breakfast  phenytoin ER, 100 mg, oral, TID  polyethylene glycol, 17 g, oral, Daily      Continuous medications:  lactated Ringer's, 75 mL/hr      PRN medications:  PRN medications: bisacodyl, bisacodyl, calcium carbonate, dextrose, dextrose,  glucagon, glucagon, HYDROmorphone, ondansetron ODT **OR** ondansetron, traMADol     Physical Exam  Vitals reviewed.   Constitutional:       General: She is not in acute distress.  HENT:      Head: Normocephalic and atraumatic.      Right Ear: External ear normal.      Left Ear: External ear normal.      Nose: Nose normal.      Mouth/Throat:      Mouth: Mucous membranes are moist.      Pharynx: Oropharynx is clear.   Eyes:      Extraocular Movements: Extraocular movements intact.      Conjunctiva/sclera: Conjunctivae normal.      Pupils: Pupils are equal, round, and reactive to light.   Cardiovascular:      Rate and Rhythm: Normal rate and regular rhythm.      Pulses: Normal pulses.      Heart sounds: Normal heart sounds. No murmur heard.  Pulmonary:      Effort: Pulmonary effort is normal. No respiratory distress.      Breath sounds: Normal breath sounds. No wheezing, rhonchi or rales.   Chest:      Chest wall: No tenderness.   Abdominal:      General: Bowel sounds are normal. There is no distension.      Palpations: Abdomen is soft. There is no mass.      Tenderness: There is no abdominal tenderness. There is no rebound.   Musculoskeletal:         General: No swelling or deformity. Normal range of motion.      Cervical back: Normal range of motion.      Right lower leg: Edema present.      Left lower leg: No edema.   Skin:     General: Skin is warm and dry.      Capillary Refill: Capillary refill takes less than 2 seconds.   Neurological:      General: No focal deficit present.      Mental Status: She is alert and oriented to person, place, and time.   Psychiatric:         Mood and Affect: Mood normal.         Behavior: Behavior normal.                  Code Status  Full Code     Assessment/Plan        Principal Problem:    Closed nondisplaced fracture of greater trochanter of left femur, initial encounter (Multi)     Status post fall with left greater trochanter fracture not extending   Patient did have CT of the  pelvis, MRI of pelvis  Seen by ortho not felt to be surgical candidate  Will continue analgesics, PT evaluation  Dr Meza would like to see again 3 weeks to recheck,     Contusion to the left elbow  OT evaluation     UTI enteric bacilli on cx  Continue antibiotics  Check blood cultures     HTN  Continue home medications  Consider increasing amlodipine     DM2  Cover w sliding scale insulin  A1c is 6.8 will continue carb controlled diet     Elevated CK due to patient fall  Is improved     Anemia,  stable  Will continue to monitor  Pt under weight will ask dietician for consult        No new Assessment & Plan notes have been filed under this hospital service since the last note was generated.  Service: Internal Medicine              DVT ppx:        Please see orders for more complete plan    Chet Pinto, APRN-CNP

## 2024-06-16 NOTE — PROGRESS NOTES
MD messaged regarding BP being uncontrolled at 190/76. no current pain or agitation. chart reviewed, appears to be uncontrolled through hospital stay despite home losartan. will start on amlodipine 5mg, titrate up as needed. will avoid PRN/short acting due to potential for adverse outcomes.   4.5 5 2

## 2024-06-16 NOTE — CARE PLAN
Problem: Fall/Injury  Goal: Not fall by end of shift  Outcome: Progressing     Problem: Pain  Goal: Turns in bed with improved pain control throughout the shift  Outcome: Progressing   The patient's goals for the shift include      The clinical goals for the shift include pt will remain free of falls or injury this shift

## 2024-06-16 NOTE — CARE PLAN
Problem: Skin  Goal: Decreased wound size/increased tissue granulation at next dressing change  Outcome: Progressing  Flowsheets (Taken 6/16/2024 1053)  Decreased wound size/increased tissue granulation at next dressing change:   Promote sleep for wound healing   Protective dressings over bony prominences  Goal: Participates in plan/prevention/treatment measures  Outcome: Progressing  Flowsheets (Taken 6/16/2024 1053)  Participates in plan/prevention/treatment measures:   Discuss with provider PT/OT consult   Elevate heels   Increase activity/out of bed for meals  Goal: Prevent/manage excess moisture  Outcome: Progressing  Flowsheets (Taken 6/16/2024 1053)  Prevent/manage excess moisture:   Moisturize dry skin   Cleanse incontinence/protect with barrier cream   Follow provider orders for dressing changes   Monitor for/manage infection if present  Goal: Prevent/minimize sheer/friction injuries  Outcome: Progressing  Flowsheets (Taken 6/16/2024 1053)  Prevent/minimize sheer/friction injuries:   Increase activity/out of bed for meals   Turn/reposition every 2 hours/use positioning/transfer devices   Use pull sheet  Goal: Promote/optimize nutrition  Outcome: Progressing  Flowsheets (Taken 6/16/2024 1053)  Promote/optimize nutrition:   Assist with feeding   Consume > 50% meals/supplements   Monitor/record intake including meals   Offer water/supplements/favorite foods  Goal: Promote skin healing  Outcome: Progressing  Flowsheets (Taken 6/16/2024 1053)  Promote skin healing:   Assess skin/pad under line(s)/device(s)   Protective dressings over bony prominences   Turn/reposition every 2 hours/use positioning/transfer devices     Problem: Fall/Injury  Goal: Not fall by end of shift  Outcome: Progressing  Goal: Be free from injury by end of the shift  Outcome: Progressing  Goal: Verbalize understanding of personal risk factors for fall in the hospital  Outcome: Progressing  Goal: Verbalize understanding of risk factor  reduction measures to prevent injury from fall in the home  Outcome: Progressing  Goal: Use assistive devices by end of the shift  Outcome: Progressing  Goal: Pace activities to prevent fatigue by end of the shift  Outcome: Progressing     Problem: Pain  Goal: Takes deep breaths with improved pain control throughout the shift  Outcome: Progressing  Goal: Turns in bed with improved pain control throughout the shift  Outcome: Progressing  Goal: Walks with improved pain control throughout the shift  Outcome: Progressing  Goal: Performs ADL's with improved pain control throughout shift  Outcome: Progressing  Goal: Participates in PT with improved pain control throughout the shift  Outcome: Progressing  Goal: Free from opioid side effects throughout the shift  Outcome: Progressing  Goal: Free from acute confusion related to pain meds throughout the shift  Outcome: Progressing   The patient's goals for the shift include      The clinical goals for the shift include Pt will remain safe and free from injury throughout shift.

## 2024-06-17 ENCOUNTER — APPOINTMENT (OUTPATIENT)
Dept: RADIOLOGY | Facility: HOSPITAL | Age: 89
DRG: 536 | End: 2024-06-17
Payer: MEDICARE

## 2024-06-17 LAB
GLUCOSE BLD MANUAL STRIP-MCNC: 149 MG/DL (ref 74–99)
GLUCOSE BLD MANUAL STRIP-MCNC: 153 MG/DL (ref 74–99)
GLUCOSE BLD MANUAL STRIP-MCNC: 157 MG/DL (ref 74–99)
GLUCOSE BLD MANUAL STRIP-MCNC: 171 MG/DL (ref 74–99)

## 2024-06-17 PROCEDURE — 73110 X-RAY EXAM OF WRIST: CPT | Mod: LEFT SIDE | Performed by: RADIOLOGY

## 2024-06-17 PROCEDURE — 97530 THERAPEUTIC ACTIVITIES: CPT | Mod: GP,CQ

## 2024-06-17 PROCEDURE — 73130 X-RAY EXAM OF HAND: CPT | Mod: LT

## 2024-06-17 PROCEDURE — 73110 X-RAY EXAM OF WRIST: CPT | Mod: LT

## 2024-06-17 PROCEDURE — 1210000001 HC SEMI-PRIVATE ROOM DAILY

## 2024-06-17 PROCEDURE — 2500000004 HC RX 250 GENERAL PHARMACY W/ HCPCS (ALT 636 FOR OP/ED): Performed by: NURSE PRACTITIONER

## 2024-06-17 PROCEDURE — 97535 SELF CARE MNGMENT TRAINING: CPT | Mod: GO,CO

## 2024-06-17 PROCEDURE — 82947 ASSAY GLUCOSE BLOOD QUANT: CPT | Mod: 91

## 2024-06-17 PROCEDURE — 2500000002 HC RX 250 W HCPCS SELF ADMINISTERED DRUGS (ALT 637 FOR MEDICARE OP, ALT 636 FOR OP/ED): Performed by: NURSE PRACTITIONER

## 2024-06-17 PROCEDURE — 2500000001 HC RX 250 WO HCPCS SELF ADMINISTERED DRUGS (ALT 637 FOR MEDICARE OP): Performed by: NURSE PRACTITIONER

## 2024-06-17 PROCEDURE — C9113 INJ PANTOPRAZOLE SODIUM, VIA: HCPCS | Performed by: NURSE PRACTITIONER

## 2024-06-17 PROCEDURE — 99233 SBSQ HOSP IP/OBS HIGH 50: CPT | Performed by: FAMILY MEDICINE

## 2024-06-17 PROCEDURE — 97530 THERAPEUTIC ACTIVITIES: CPT | Mod: GO,CO

## 2024-06-17 PROCEDURE — 73130 X-RAY EXAM OF HAND: CPT | Mod: LEFT SIDE | Performed by: RADIOLOGY

## 2024-06-17 PROCEDURE — 97110 THERAPEUTIC EXERCISES: CPT | Mod: GP,CQ

## 2024-06-17 ASSESSMENT — COGNITIVE AND FUNCTIONAL STATUS - GENERAL
TOILETING: A LOT
STANDING UP FROM CHAIR USING ARMS: A LOT
HELP NEEDED FOR BATHING: A LOT
EATING MEALS: A LITTLE
HELP NEEDED FOR BATHING: A LOT
TOILETING: A LOT
DRESSING REGULAR LOWER BODY CLOTHING: A LOT
TOILETING: A LOT
STANDING UP FROM CHAIR USING ARMS: A LOT
MOVING TO AND FROM BED TO CHAIR: A LOT
MOVING FROM LYING ON BACK TO SITTING ON SIDE OF FLAT BED WITH BEDRAILS: A LOT
CLIMB 3 TO 5 STEPS WITH RAILING: TOTAL
PERSONAL GROOMING: A LITTLE
MOVING FROM LYING ON BACK TO SITTING ON SIDE OF FLAT BED WITH BEDRAILS: A LOT
DAILY ACTIVITIY SCORE: 14
PERSONAL GROOMING: A LITTLE
MOVING TO AND FROM BED TO CHAIR: A LOT
DAILY ACTIVITIY SCORE: 14
MOVING FROM LYING ON BACK TO SITTING ON SIDE OF FLAT BED WITH BEDRAILS: A LOT
MOBILITY SCORE: 10
PERSONAL GROOMING: A LITTLE
DRESSING REGULAR LOWER BODY CLOTHING: TOTAL
TURNING FROM BACK TO SIDE WHILE IN FLAT BAD: A LOT
DAILY ACTIVITIY SCORE: 15
DRESSING REGULAR UPPER BODY CLOTHING: A LITTLE
TURNING FROM BACK TO SIDE WHILE IN FLAT BAD: A LOT
TURNING FROM BACK TO SIDE WHILE IN FLAT BAD: A LOT
DRESSING REGULAR LOWER BODY CLOTHING: A LOT
CLIMB 3 TO 5 STEPS WITH RAILING: TOTAL
MOBILITY SCORE: 10
DRESSING REGULAR UPPER BODY CLOTHING: A LOT
HELP NEEDED FOR BATHING: A LOT
MOBILITY SCORE: 10
DRESSING REGULAR UPPER BODY CLOTHING: A LOT
WALKING IN HOSPITAL ROOM: TOTAL
WALKING IN HOSPITAL ROOM: TOTAL
MOVING TO AND FROM BED TO CHAIR: A LOT
EATING MEALS: A LITTLE
WALKING IN HOSPITAL ROOM: TOTAL
CLIMB 3 TO 5 STEPS WITH RAILING: TOTAL
STANDING UP FROM CHAIR USING ARMS: A LOT

## 2024-06-17 ASSESSMENT — ENCOUNTER SYMPTOMS
FEVER: 0
CHILLS: 0
SHORTNESS OF BREATH: 0
ABDOMINAL DISTENTION: 0
FLANK PAIN: 0
NAUSEA: 0
DYSURIA: 0
FREQUENCY: 0
ABDOMINAL PAIN: 0

## 2024-06-17 ASSESSMENT — PAIN SCALES - GENERAL
PAINLEVEL_OUTOF10: 3
PAINLEVEL_OUTOF10: 0 - NO PAIN

## 2024-06-17 ASSESSMENT — PAIN - FUNCTIONAL ASSESSMENT
PAIN_FUNCTIONAL_ASSESSMENT: 0-10
PAIN_FUNCTIONAL_ASSESSMENT: 0-10

## 2024-06-17 ASSESSMENT — ACTIVITIES OF DAILY LIVING (ADL)
HOME_MANAGEMENT_TIME_ENTRY: 10
LACK_OF_TRANSPORTATION: NO

## 2024-06-17 NOTE — CARE PLAN
Problem: Skin  Goal: Decreased wound size/increased tissue granulation at next dressing change  6/17/2024 1829 by Radha Cohen RN  Outcome: Progressing  6/17/2024 1147 by Radha Cohen RN  Outcome: Progressing  Flowsheets (Taken 6/17/2024 1147)  Decreased wound size/increased tissue granulation at next dressing change:   Promote sleep for wound healing   Protective dressings over bony prominences  Goal: Participates in plan/prevention/treatment measures  6/17/2024 1829 by Radha Cohen RN  Outcome: Progressing  6/17/2024 1147 by Radha Cohen RN  Outcome: Progressing  Flowsheets (Taken 6/17/2024 1147)  Participates in plan/prevention/treatment measures:   Discuss with provider PT/OT consult   Elevate heels  Goal: Prevent/manage excess moisture  6/17/2024 1829 by Radha Cohen RN  Outcome: Progressing  6/17/2024 1147 by Radha Cohen RN  Outcome: Progressing  Flowsheets (Taken 6/17/2024 1147)  Prevent/manage excess moisture: Monitor for/manage infection if present  Goal: Prevent/minimize sheer/friction injuries  6/17/2024 1829 by Radha Cohen RN  Outcome: Progressing  6/17/2024 1147 by Radha Cohen RN  Outcome: Progressing  Flowsheets (Taken 6/17/2024 1147)  Prevent/minimize sheer/friction injuries:   Increase activity/out of bed for meals   HOB 30 degrees or less  Goal: Promote/optimize nutrition  6/17/2024 1829 by Radha Cohen RN  Outcome: Progressing  6/17/2024 1147 by Radha Cohen RN  Outcome: Progressing  Flowsheets (Taken 6/17/2024 1147)  Promote/optimize nutrition:   Consume > 50% meals/supplements   Monitor/record intake including meals  Goal: Promote skin healing  6/17/2024 1829 by Radha Cohen RN  Outcome: Progressing  6/17/2024 1147 by Radha Cohen RN  Outcome: Progressing  Flowsheets (Taken 6/17/2024 1147)  Promote skin healing: Assess skin/pad under line(s)/device(s)     Problem: Fall/Injury  Goal:  Not fall by end of shift  6/17/2024 1829 by Radha Cohen RN  Outcome: Met  6/17/2024 1147 by Radha Cohen RN  Outcome: Progressing  Goal: Be free from injury by end of the shift  6/17/2024 1829 by Radha Cohen RN  Outcome: Progressing  6/17/2024 1147 by Radha Cohen RN  Outcome: Progressing  Goal: Verbalize understanding of personal risk factors for fall in the hospital  6/17/2024 1829 by Radha Cohen RN  Outcome: Progressing  6/17/2024 1147 by Radha Cohen RN  Outcome: Progressing  Goal: Verbalize understanding of risk factor reduction measures to prevent injury from fall in the home  6/17/2024 1829 by Radha Cohen RN  Outcome: Progressing  6/17/2024 1147 by Radha Cohen RN  Outcome: Progressing  Goal: Use assistive devices by end of the shift  6/17/2024 1829 by Radha Cohen RN  Outcome: Progressing  6/17/2024 1147 by Radha Cohen RN  Outcome: Progressing  Goal: Pace activities to prevent fatigue by end of the shift  6/17/2024 1829 by Radha Cohen RN  Outcome: Progressing  6/17/2024 1147 by Radha Cohen RN  Outcome: Progressing     Problem: Pain  Goal: Takes deep breaths with improved pain control throughout the shift  6/17/2024 1829 by Radha Cohen RN  Outcome: Progressing  6/17/2024 1147 by Radha Cohen RN  Outcome: Progressing  Goal: Turns in bed with improved pain control throughout the shift  6/17/2024 1829 by Radha Cohen RN  Outcome: Progressing  6/17/2024 1147 by Radha Cohen RN  Outcome: Progressing  Goal: Walks with improved pain control throughout the shift  6/17/2024 1829 by Radha Cohen RN  Outcome: Progressing  6/17/2024 1147 by Radha Cohen RN  Outcome: Progressing  Goal: Performs ADL's with improved pain control throughout shift  6/17/2024 1829 by Radha Cohen RN  Outcome: Progressing  6/17/2024 1147 by Radha Cohen  RN  Outcome: Progressing  Goal: Participates in PT with improved pain control throughout the shift  6/17/2024 1829 by Radha Cohen RN  Outcome: Progressing  6/17/2024 1147 by Radha Cohen RN  Outcome: Progressing  Goal: Free from opioid side effects throughout the shift  6/17/2024 1829 by Radha Cohen RN  Outcome: Progressing  6/17/2024 1147 by Radha Cohen RN  Outcome: Progressing  Goal: Free from acute confusion related to pain meds throughout the shift  6/17/2024 1829 by Radha Cohen RN  Outcome: Progressing  6/17/2024 1147 by Radha Cohen RN  Outcome: Progressing

## 2024-06-17 NOTE — CARE PLAN
Problem: Skin  Goal: Decreased wound size/increased tissue granulation at next dressing change  Outcome: Progressing     Problem: Fall/Injury  Goal: Not fall by end of shift  Outcome: Progressing     Problem: Pain  Goal: Takes deep breaths with improved pain control throughout the shift  Outcome: Progressing   The patient's goals for the shift include      The clinical goals for the shift include pt will remain free of falls or injury this shift

## 2024-06-17 NOTE — PROGRESS NOTES
Joseline Umana is a 89 y.o. female on day 3 of admission presenting with Closed nondisplaced fracture of greater trochanter of left femur, initial encounter (Multi).      Subjective   Joseline Umana is a 89 y.o. female with PMHx s/f HTN,  presenting with back pain after a fall. Pt lives a lone but has daughters nearby that check on her frequently. Evidently she lost her balance throwing a bag of trash into a can. She fell to the ground injuring her back and was unable to get off the ground, she was found half a day later and brought to the hospital by ambulance.  She denies loss of consciousness, cp, palpitations, near syncope, fever chills. On arrival to the ED pt had T 98,  RR18 and bp 140/92, heart rate improved with analgesia. Pt found to have UTI given 1 G of rocephin.  CBC showing wbc 14.5 Hbg 10.7, platelets 254.  Glucose 281 BUN 29 cratinin is normal ck 329.  CT of the head was negative for intracranial hemorrhage stroke mass or e or acute finding.  CT of the cervical spine was negative for any acute fracture there are degenerative changes noted however.  Shoulder and elbow present without obvious fracture x-ray of the left hip and pelvis appears to show a nondisplaced fracture through the left femoral greater trochanter.  The ED physician discussed these findings with orthopedic surgery on-call who advised that at this point it does not appear to be a surgical case however he also advised to get a CT scan of the of the pelvis, patient is to be admitted to continue treating her urinary tract infection control her pain mobilize the patient with physical therapy and determine a safe disposition for her recuperation.     6/15/24: No pain at rest, CK slightly more elevated will plan to continue hydration. Anemia also worsened could be dilutional or loss from fracture, will check iron studies. Seen by Ortho, no plans for surgery. Will attempt to mobilize and evaluate pain. Will need long term vte  prophylaxis.     06/16/24 Pt with tachycardia, elevated bp, leukocytosis. Will activate Sepsis alert. Continue treatment for UTI, check blood cultures, LFT, lactate, Give a little extra fluid to improve urine output. Will also recheck CXR pt could have pneumonia not seen at admission due to poor volume status. Will also check blood cultures. Pt wanted to go home. CK now trending down, anemia appears stable.     6/17:                Today the patient is awake and interactive appropriately.  She is only complaining of left wrist and hand discomfort.  Nurses have noted some swelling and discoloration as well.  Initially on visit appears sleeping but easily awakens to name.  Urine culture growing greater than 100,000 and E. coli pansensitive and continues on Rocephin.  AM-PAC 11 and working towards acute rehab.  As her forearm wrist and hand had not been imaged on admission we checked x-rays of these today which reveal no acute findings but multifocal osteoarthritis most severe at the base of the thumb with severe chondrocalcinosis suggestive of CPPD.           Review of Systems   Constitutional:  Negative for chills and fever.   Respiratory:  Negative for shortness of breath.    Cardiovascular:  Negative for chest pain.   Gastrointestinal:  Negative for abdominal distention, abdominal pain and nausea.   Genitourinary:  Negative for dysuria, flank pain and frequency.          Objective     Last Recorded Vitals  /65 (BP Location: Right arm, Patient Position: Lying)   Pulse 106   Temp 36.3 °C (97.4 °F) (Temporal)   Resp 18   Wt (!) 41.2 kg (90 lb 13.3 oz)   SpO2 98%     Image Results  MR hip left wo IV contrast    Result Date: 6/15/2024  Interpreted By:  Finkelstein, Evan, STUDY: MR HIP LEFT WO IV CONTRAST;  6/14/2024 9:46 pm   INDICATION: Signs/Symptoms:L greater trochanteric femur fracture r/o less trochanteric extension.   COMPARISON: CT pelvis 06/14/2025   ACCESSION NUMBER(S): [id number]   ORDERING  CLINICIAN: KATHLEEN CHERY   TECHNIQUE: Multiplanar, multisequence MR imaging of the left hip and pelvis without contrast.   FINDINGS: Acute fracture involving the greater trochanter of the left femur. No definite extension to the lesser trochanter or the femoral neck or head. The gluteus saida tendons are intact. There is fluid signal at the insertion of the gluteus medius tendon suggesting partial tear. There is edema throughout the gluteus medius and minimus musculature as well as the visualized muscles in the anterior compartment of the proximal thigh and adductor musculature compatible with muscle strain. Asymmetric small left femoroacetabular joint effusion.       Acute fracture involving the greater trochanter of the left femur. No definite extension to the lesser trochanter, femoral neck or head. Fluid signal at the insertion of the gluteus medius tendon suggesting partial tear. Edema throughout the gluteus medius and minimus musculature as well as the visualized muscles in the anterior compartment of the proximal thigh and adductor musculature compatible with muscle strain.     MACRO: None.   Signed by: Evan Finkelstein 6/15/2024 2:34 AM Dictation workstation:   [id number]    CT pelvis wo IV contrast    Result Date: 6/14/2024  STUDY: CT PELVIS without CONTRAST; 06/14/2024, 3:30 PM. INDICATION:  Left hip pain. Proximal femur fracture. COMPARISON: Not available. ACCESSION NUMBER(S): [id number] ORDERING CLINICIAN: CUONG CAMERON TECHNIQUE:  Thin section axial images were obtained through the pelvis without intravenous contrast.  Orthogonal reconstructed images were obtained and reviewed.  Automated mA/kV exposure control was utilized and patient examination was performed in strict accordance with principles of ALARA. FINDINGS: There is comminuted fractures involving the greater trochanter of the left femur.  No fractures are seen within the left femoral head.  No fracture is seen within the iliac  wings, pubic rami, sacrum, or coccyx.  There is edema seen involving the distal aspect of the left iliopsoas muscle.  No prominent joint effusion is noted.  There is a fecal impaction.  No enlarged lymph as seen in the iliac chains.    1.  Comminuted fractures of the greater trochanter of the left femur, with prominent edema involving the distal left iliopsoas muscle. 2.  Fecal impaction. Signed by Evin Freeman MD    XR hip left with pelvis when performed 2 or 3 views    Result Date: 6/14/2024  Interpreted By:  Kevon Lyn, STUDY: XR HIP LEFT WITH PELVIS WHEN PERFORMED 2 OR 3 VIEWS;  6/14/2024 1:23 pm   INDICATION: Signs/Symptoms:fall.   COMPARISON: None.   ACCESSION NUMBER(S): [id number]   ORDERING CLINICIAN: CUONG CAMERON   FINDINGS: Exam limited due to underexposure as well as overlying external devices. Nondisplaced fracture through the left femoral greater trochanter. No definite pelvic fracture identified otherwise. Moderate degenerative changes of the hips.       Nondisplaced fracture through the left femoral greater trochanter.   MACRO: None   Signed by: Kevon Lyn 6/14/2024 1:54 PM Dictation workstation:   [id number]    XR elbow left 3+ views    Result Date: 6/14/2024  Interpreted By:  Kevon Lyn, STUDY: XR ELBOW LEFT 3+ VIEWS;  6/14/2024 1:23 pm   INDICATION: Signs/Symptoms:fall.   COMPARISON: None.   ACCESSION NUMBER(S): [id number]   ORDERING CLINICIAN: CUONG CAMERON   FINDINGS: No acute fracture or dislocation. No definite joint effusion. Small osteophytes indicate degenerative changes.       No acute osseous findings of the left elbow.   MACRO: None   Signed by: Kevon Lyn 6/14/2024 1:52 PM Dictation workstation:   [id number]    XR shoulder left 2+ views    Result Date: 6/14/2024  Interpreted By:  Layo Garcia, STUDY: XR SHOULDER LEFT 2+ VIEWS; ;  6/14/2024 1:23 pm   INDICATION: Signs/Symptoms:fall.   COMPARISON: None.   ACCESSION NUMBER(S): [id number]   ORDERING CLINICIAN:  CUONG CAMERON   FINDINGS: Loss of the acromial humeral interval with high riding of the humeral head with respect to the glenoid fossa favors chronic rotator cuff pathology. There is calcific tendinosis of the rotator cuff. AC joint is normal.       Stigmata of chronic rotator cuff pathology with advanced arthritis of the glenohumeral joint. No acute osseous abnormality     MACRO: None   Signed by: Layo Garcia 6/14/2024 1:41 PM Dictation workstation:   [id number]    CT cervical spine wo IV contrast    Result Date: 6/14/2024  Interpreted By:  Tj Brizuela, STUDY: CT CERVICAL SPINE WO IV CONTRAST; 6/14/2024 12:52 pm   INDICATION: Signs/Symptoms:fall.   COMPARISON: None.   ACCESSION NUMBER(S): [id number]   ORDERING CLINICIAN: CUONG CAMERON   TECHNIQUE: Contiguous axial CT images were obtained at  2 mm slice thickness through the cervical spine without contrast administration. The images were then reconstructed in the coronal and sagittal planes.   FINDINGS: There is no CT evidence of acute fracture identified. No prevertebral soft tissue swelling is identified.   ALIGNMENT: There is minimal anterolisthesis of C3 on C4, of C6 on C7 and of C7 on T1 and mild retrolisthesis of C4 on C5.   VERTEBRAE/DISC SPACES: Moderate disc space narrowing and marginal osteophyte formation is seen at the C4-5 and C5-6 levels. Minimal discogenic degenerative changes are seen elsewhere in the cervical spine. Mild facet degenerative changes are seen throughout the cervical spine.   C2-3: There is no significant neural foraminal or central canal narrowing identified.   C3-4: There is no significant neural foraminal or central canal narrowing identified.   C4-5: Moderate neural foraminal narrowing is seen bilaterally. No significant central canal narrowing is seen.   C5-6: Mild neural foraminal narrowing is seen bilaterally. No significant central canal narrowing is seen.   C6-7: There is no significant neural foraminal or  central canal narrowing identified.   C7-T1: There is no significant neural foraminal or central canal narrowing identified.   ADDITIONAL FINDINGS: Evaluation of the visualized soft tissues of the neck is limited by the lack of intravenous contrast.  Within this limitation, no gross mass or lymphadenopathy is identified. Significant atherosclerotic calcifications are seen in the carotid bulbs bilaterally.       1.  No CT evidence of acute fracture. 2.  Degenerative changes throughout the cervical spine, as above.   Signed by: Tj Brizuela 6/14/2024 1:19 PM Dictation workstation:   [id number]    CT head wo IV contrast    Result Date: 6/14/2024  Interpreted By:  Tj Brizuela, STUDY: CT HEAD WO IV CONTRAST; 6/14/2024 12:52 pm   INDICATION: Signs/Symptoms:fall.   COMPARISON: CT head dated 08/02/2021   ACCESSION NUMBER(S): [id number]   ORDERING CLINICIAN: CUONG CAMERON   TECHNIQUE: Contiguous axial CT images were obtained through the head at 5 mm slice thickness without contrast administration.   FINDINGS: INTRACRANIAL: Mild diffuse volume loss is seen bilaterally. Mild subcortical and periventricular white matter changes are seen.  The grey-white differentiation is intact. There is no mass effect or midline shift. There is no extraaxial fluid collection. There is no intracranial hemorrhage.  The calvarium  is unremarkable.   EXTRACRANIAL: Visualized paranasal sinuses and mastoids are clear.       1. Diffuse volume loss and periventricular white matter changes, most consistent with small vessel ischemic disease. 2. No evidence of acute cortical infarct or intracranial hemorrhage.     MACRO: None   Signed by: Tj Brizuela 6/14/2024 1:16 PM Dictation workstation:   [id number]    ECG 12 lead    Result Date: 6/14/2024  Sinus tachycardia Left atrial enlargement Borderline low voltage, extremity leads Probable LVH with secondary repol abnrm Prolonged QT interval Artifact in lead(s) II,V1,V2,V3,V4,V5,V6       Lab  Results  Results for orders placed or performed during the hospital encounter of 06/14/24 (from the past 24 hour(s))   POCT GLUCOSE   Result Value Ref Range    POCT Glucose 193 (H) 74 - 99 mg/dL   POCT GLUCOSE   Result Value Ref Range    POCT Glucose 149 (H) 74 - 99 mg/dL   POCT GLUCOSE   Result Value Ref Range    POCT Glucose 171 (H) 74 - 99 mg/dL   POCT GLUCOSE   Result Value Ref Range    POCT Glucose 157 (H) 74 - 99 mg/dL        Medications  Scheduled medications:  amLODIPine, 10 mg, oral, Daily  atorvastatin, 40 mg, oral, Nightly  cefTRIAXone, 1 g, intravenous, q24h  heparin (porcine), 5,000 Units, subcutaneous, q8h  insulin regular, 0-10 Units, subcutaneous, TID  levothyroxine, 112 mcg, oral, Daily  losartan, 100 mg, oral, Daily  pantoprazole, 40 mg, oral, Daily before breakfast   Or  pantoprazole, 40 mg, intravenous, Daily before breakfast  phenytoin ER, 100 mg, oral, TID  polyethylene glycol, 17 g, oral, Daily      Continuous medications:       PRN medications:  PRN medications: bisacodyl, bisacodyl, calcium carbonate, dextrose, dextrose, glucagon, glucagon, HYDROmorphone, ondansetron ODT **OR** ondansetron, traMADol     Physical Exam  Vitals reviewed.   Constitutional:       General: She is not in acute distress.     Comments: Slender elderly  female awake alert and interactive appropriately   HENT:      Head: Normocephalic and atraumatic.      Right Ear: External ear normal.      Left Ear: External ear normal.      Nose: Nose normal.      Mouth/Throat:      Mouth: Mucous membranes are moist.      Pharynx: Oropharynx is clear.   Eyes:      Extraocular Movements: Extraocular movements intact.      Conjunctiva/sclera: Conjunctivae normal.      Pupils: Pupils are equal, round, and reactive to light.   Cardiovascular:      Rate and Rhythm: Normal rate and regular rhythm.      Pulses: Normal pulses.      Heart sounds: Normal heart sounds. No murmur heard.  Pulmonary:      Effort: Pulmonary effort is  normal. No respiratory distress.      Breath sounds: Normal breath sounds. No wheezing, rhonchi or rales.   Chest:      Chest wall: No tenderness.   Abdominal:      General: Bowel sounds are normal. There is no distension.      Palpations: Abdomen is soft. There is no mass.      Tenderness: There is no abdominal tenderness. There is no rebound.   Musculoskeletal:         General: No deformity. Normal range of motion.      Cervical back: Normal range of motion.      Right lower leg: Edema present.      Left lower leg: No edema.      Comments: Attention to the left upper extremity reveals she has moderate edema overlying the wrist and dorsum of the left hand.  There is mild erythema/ecchymoses present.  No obvious deformity.  Tender on palpation especially about the base of the thumb.  No crepitus with range of motion.  She does have mild to moderate  left hand.  Neurovascularly intact.   Skin:     General: Skin is warm and dry.      Capillary Refill: Capillary refill takes less than 2 seconds.   Neurological:      General: No focal deficit present.      Mental Status: She is alert and oriented to person, place, and time.   Psychiatric:         Mood and Affect: Mood normal.         Behavior: Behavior normal.                  Code Status  Full Code     Assessment/Plan        Principal Problem:    Closed nondisplaced fracture of greater trochanter of left femur, initial encounter (Multi)     Status post fall with left greater trochanter fracture not extending   Patient did have CT of the pelvis, MRI of pelvis  Seen by ortho not felt to be surgical candidate  Will continue analgesics, PT evaluation  Dr Meza would like to see again 3 weeks to recheck,  6/17: Working towards discharge to acute rehab.     Contusion to the left elbow  OT evaluation    Contusion/?  Sprain left wrist  6/17: She does have mild to moderate edema about the wrist and dorsum of the hand.  X-ray reveals no fractures but she has severe OA  involving base of the thumb as well as severe chondrocalcinosis.  Symptomatic care.     UTI enteric bacilli on cx  Continue antibiotics  Check blood cultures  6/17: Urine culture growing greater than 100,000 E. coli pansensitive.     HTN  Continue home medications  Consider increasing amlodipine     DM2  Cover w sliding scale insulin  A1c is 6.8 will continue carb controlled diet     Elevated CK due to patient fall  Is improved     Anemia,  stable  Will continue to monitor  Pt under weight will ask dietician for consult                    DVT ppx: Heparin subcu       Please see orders for more complete plan    Louis Patino MD

## 2024-06-17 NOTE — PROGRESS NOTES
Social work consult placed for positive medical risk screen. SW reviewed pt's chart and communicated with TCC. No SW needs foreseen at this time. SW signing off; available upon request.    SHOBHA Robles, LSW ([id number])   Care Transitions

## 2024-06-17 NOTE — PROGRESS NOTES
Occupational Therapy    OT Treatment    Patient Name: Joseline Umana  MRN: [id number]  Today's Date: 6/17/2024  Time Calculation  Start Time: 1116  Stop Time: 1141  Time Calculation (min): 25 min         Assessment:  End of Session Communication: Bedside nurse  End of Session Patient Position: Up in chair, Alarm on     Plan:  Treatment Interventions: ADL retraining, Functional transfer training        Subjective   Previous Visit Info:  OT Last Visit  OT Received On: 06/17/24  General:  General  Co-Treatment: PT  Co-Treatment Reason: to maximize safe mobility during functional assessment  Prior to Session Communication: Bedside nurse  Patient Position Received: Bed, 3 rail up, Alarm on (sister at bedside)  General Comment: pt. coopertive tend to lean to L side as RN reported patient had c/os pain at LUE. she is MOD A x2 for transfers and bed mobility.       Pain:  Pain Assessment  Pain Assessment: 0-10  Pain Score:  (pain reported at LUE  did not rate)  Pain Interventions: Repositioned    Objective    Cognition:  Cognition  Orientation Level: Disoriented to situation, Disoriented to time  Coordination:     Activities of Daily Living: Grooming  Grooming Level of Assistance: Minimum assistance  Grooming Where Assessed: Chair  Grooming Comments: brush hair, brush  teeth and wash face.  .                 Functional Standing Tolerance:  Time: 1-2 mins  Activity: standing during transfer.  Bed Mobility/Transfers: Bed Mobility  Bed Mobility: Yes  Bed Mobility 1  Bed Mobility 1: Supine to sitting  Level of Assistance 1: Moderate assistance, +2    Transfers  Transfer: Yes  Transfer 1  Transfer From 1: Bed to  Transfer to 1: Stand  Technique 1: Sit to stand, Stand to sit  Transfer Device 1:  (arm in arm)  Transfer Level of Assistance 1: Moderate assistance, Minimal tactile cues, Moderate verbal cues  Transfers 2  Transfer From 2: Bed to  Transfer to 2: Chair with arms  Technique 2: Stand pivot  Transfer Device 2:  Walker  Transfer Level of Assistance 2: Moderate assistance, +2      Functional Mobility:  Functional Mobility  Functional Mobility Performed: Yes  Functional Mobility 1  Device 1: Rolling walker  Assistance 1: Moderate assistance, Moderate verbal cues, Moderate tactile cues (x2)  Quality of Functional Mobility 1:  (stand pivot)            Outcome Measures:[facility] Daily Activity  Putting on and taking off regular lower body clothing: A lot  Bathing (including washing, rinsing, drying): A lot  Putting on and taking off regular upper body clothing: A lot  Toileting, which includes using toilet, bedpan or urinal: A lot  Taking care of personal grooming such as brushing teeth: A little  Eating Meals: A little  Daily Activity - Total Score: 14        Education Documentation  ADL Training, taught by ALETA Faith at 6/17/2024  1:41 PM.  Learner: Patient  Readiness: Acceptance  Method: Demonstration, Explanation  Response: Needs Reinforcement    Body Mechanics, taught by ALETA Faith at 6/17/2024  1:41 PM.  Learner: Patient  Readiness: Acceptance  Method: Demonstration, Explanation  Response: Needs Reinforcement    Precautions, taught by ALETA Faith at 6/17/2024  1:41 PM.  Learner: Patient  Readiness: Acceptance  Method: Demonstration, Explanation  Response: Needs Reinforcement    Education Comments  No comments found.        OP EDUCATION:       Goals:  Encounter Problems       Encounter Problems (Active)       ADLs       Demo. UB bathing, grooming while seated EOB with SBA.  (Progressing)       Start:  06/15/24    Expected End:  06/22/24               BALANCE       Sonia. at least 3 mins. dyn. stand with CGA and FWW (Progressing)       Start:  06/15/24    Expected End:  06/22/24               TRANSFERS       Min.A func. trfrs. with FWW  (Progressing)       Start:  06/15/24    Expected End:  06/22/24

## 2024-06-17 NOTE — CONSULTS
Nutrition Initial Assessment:   Nutrition Assessment         Medical history per chart:   Joseline Umana is a 89 y.o. female with PMHx s/f HTN, presenting with back pain after a fall. Pt lives alone but has daughters nearby that check on her frequently. Per social work- Patient is from home alone with support from her sister. Patient is independent with ambulation, self care, driving, shopping, and meals.  Patient has been recommended for acute rehab.   +UTI    6/17: Patient seen in AM. Assessment difficult to complete as patient fell asleep during multiple times during conversation. Patient feels she eats a decent breakfast consisting of an egg and toast at home. Today, patient did not eat breakfast and declined lunch to be ordered. RD offered ensure but she declined. Will trial magic cup 1x/day.     Current Diet: Adult diet Regular    Nutrition Related Findings:   Oral Symptoms: none     GI symptoms: no GI issues at this time.   BM: Last BM Date: 06/15/24  Wound Type:  skin tear  (nursing/wound notes provide further details)  Edema: No  Food allergies: NKFA.  Meds/Labs reviewed.  amLODIPine, 10 mg, oral, Daily  atorvastatin, 40 mg, oral, Nightly  cefTRIAXone, 1 g, intravenous, q24h  heparin (porcine), 5,000 Units, subcutaneous, q8h  insulin regular, 0-10 Units, subcutaneous, TID  levothyroxine, 112 mcg, oral, Daily  losartan, 100 mg, oral, Daily  pantoprazole, 40 mg, oral, Daily before breakfast   Or  pantoprazole, 40 mg, intravenous, Daily before breakfast  phenytoin ER, 100 mg, oral, TID  polyethylene glycol, 17 g, oral, Daily           Nutrition Significant Labs:  Results from last 7 days   Lab Units 06/16/24  1004 06/15/24  0414 06/14/24  1119   GLUCOSE mg/dL 156* 136* 281*   SODIUM mmol/L 135* 138 136   POTASSIUM mmol/L 5.1 4.9 5.0   CHLORIDE mmol/L 101 106 102   CO2 mmol/L 17* 23 24   BUN mg/dL 27* 31* 29*   CREATININE mg/dL 0.78 0.85 0.77   EGFR mL/min/1.73m*2 71 64 72   CALCIUM mg/dL 8.8 8.4* 8.9  "  MAGNESIUM mg/dL 1.63  --   --      Lab Results   Component Value Date    HGBA1C 6.8 (H) 06/14/2024     Results from last 7 days   Lab Units 06/17/24  1112 06/17/24  0659 06/16/24  2048 06/16/24  1627 06/16/24  1109 06/16/24  0700 06/15/24  2059 06/15/24  1609   POCT GLUCOSE mg/dL 171* 149* 193* 133* 152* 164* 134* 130*     Anthropometrics:  Height: 154.9 cm (5' 1\")   Weight: (!) 41.2 kg (90 lb 13.3 oz)   BMI (Calculated): 17.17           Weight History:   Wt Readings from Last 10 Encounters:   06/14/24 (!) 41.2 kg (90 lb 13.3 oz)        Weight Change %:  Weight History / % Weight Change: Unable to assess. CBW: 90 lbs, patient unable to recall weight history           Nutrition Focused Physical Exam Findings:  Subcutaneous Fat Loss:   Orbital Fat Pads: Mild-Moderate (slight dark circles and slight hollowing)  Buccal Fat Pads: Mild-Moderate (flat cheeks, minimal bounce)  Triceps: Severe (negligible fat tissue)  Muscle Wasting:  Temporalis: Severe (hollowed scooping depression)  Pectoralis (Clavicular Region): Severe (protruding prominent clavicle)  Interosseous: Severe (depressed area between thumb and forefinger)  Edema:  Edema: none    Estimated Needs:   Total Energy Estimated Needs (kCal): 1236 kCal  Method for Estimating Needs: 30 kcal/kg CBW  Total Protein Estimated Needs (g): 49 g  Method for Estimating Needs: 1.2 g/kg     Method for Estimating Needs: 1 mL/kcal or per MD        Nutrition Diagnosis   Nutrition Diagnosis:       Nutrition Diagnosis  Patient has Nutrition Diagnosis: Yes  Diagnosis Status (1): New  Nutrition Diagnosis 1: Predicted inadequate energy intake  Related to (1): ? age  As Evidenced by (1): low BMI, muscle and fat losses, poor intakes today       Nutrition Interventions/Recommendations   Nutrition Interventions and Recommendations:        Nutrition Prescription:  Individualized Nutrition Prescription Provided for : Continue Regular diet. Added magic cup to trial-declined ensure        " Nutrition Interventions:   Food and/or Nutrient Delivery Interventions  Interventions: Meals and snacks, Medical food supplement  Meals and Snacks: General healthful diet  Goal: consume >50%  Medical Food Supplement: Commercial food  Goal: consume at least 50%         Nutrition Education:   Education Documentation  No documentation found.      Nutrition Counseling  Counseling Theoretical Approach: Other (Comment)  Goal: diet, ONS       Nutrition Monitoring and Evaluation   Monitoring/Evaluation:   Food/Nutrient Related History Monitoring  Monitoring and Evaluation Plan: Energy intake  Energy Intake: Estimated energy intake  Criteria: meet >75% of meals + ONS    Body Composition/Growth/Weight History  Monitoring and Evaluation Plan: Weight  Weight: Measured weight  Criteria: stable    Biochemical Data, Medical Tests and Procedures  Monitoring and Evaluation Plan: Electrolyte/renal panel, Glucose/endocrine profile  Electrolyte and Renal Panel: Magnesium, Phosphorus, Potassium  Criteria: wnl  Glucose/Endocrine Profile: Glucose, casual  Criteria: wnl    Nutrition Focused Physical Findings  Monitoring and Evaluation Plan: Skin  Skin: Other (Comment)  Criteria: maintain skin integrity            Time Spent/Follow-up Reminder:   Follow Up  Time Spent (min): 30 minutes  Last Date of Nutrition Visit: 06/17/24  Nutrition Follow-Up Needed?: Dietitian to reassess per policy  Follow up Comment: 6/20-6/21

## 2024-06-17 NOTE — PROGRESS NOTES
06/17/24 1240   Discharge Planning   Living Arrangements Alone   Support Systems Children;Family members   Assistance Needed Independent   Type of Residence Private residence   Home or Post Acute Services Post acute facilities (Rehab/SNF/etc)   Type of Post Acute Facility Services Rehab   Patient expects to be discharged to: Acute Rehab   Does the patient need discharge transport arranged? Yes   RoundTrip coordination needed? Yes   Has discharge transport been arranged? No   Financial Resource Strain   How hard is it for you to pay for the very basics like food, housing, medical care, and heating? Not hard   Housing Stability   In the last 12 months, was there a time when you were not able to pay the mortgage or rent on time? N   In the last 12 months, was there a time when you did not have a steady place to sleep or slept in a shelter (including now)? N   Transportation Needs   In the past 12 months, has lack of transportation kept you from medical appointments or from getting medications? no   In the past 12 months, has lack of transportation kept you from meetings, work, or from getting things needed for daily living? No   Patient Choice   Provider Choice list and CMS website (https://medicare.gov/care-compare#search) for post-acute Quality and Resource Measure Data were provided and reviewed with: Patient   Patient / Family choosing to utilize agency / facility established prior to hospitalization No     PCP is Dougie Gomez MD. Patient is from home alone with support from her sister. Patient is independent with ambulation, self care, driving, shopping, and meals.  Patient has been recommended for acute rehab. Provided acute rehab and skilled nursing list to  PT  from [facility] directory that includes facilities that are within  Post - Acute Quality Network, as well as meeting patient's medical needs, and are in-network for patient's insurance; while also in discharge geographic area patient prefers, and  identifies each facilities CMS star rating. Patient to review list for AR choices. TCC to follow.

## 2024-06-18 VITALS
DIASTOLIC BLOOD PRESSURE: 58 MMHG | BODY MASS INDEX: 17.15 KG/M2 | RESPIRATION RATE: 18 BRPM | SYSTOLIC BLOOD PRESSURE: 114 MMHG | HEART RATE: 101 BPM | TEMPERATURE: 98.7 F | OXYGEN SATURATION: 96 % | WEIGHT: 90.83 LBS | HEIGHT: 61 IN

## 2024-06-18 PROBLEM — R74.8 ELEVATED CK: Status: RESOLVED | Noted: 2024-06-14 | Resolved: 2024-06-18

## 2024-06-18 LAB
ANION GAP SERPL CALC-SCNC: 20 MMOL/L (ref 10–20)
BUN SERPL-MCNC: 40 MG/DL (ref 6–23)
CALCIUM SERPL-MCNC: 8.8 MG/DL (ref 8.6–10.3)
CHLORIDE SERPL-SCNC: 101 MMOL/L (ref 98–107)
CO2 SERPL-SCNC: 18 MMOL/L (ref 21–32)
CREAT SERPL-MCNC: 0.94 MG/DL (ref 0.5–1.05)
EGFRCR SERPLBLD CKD-EPI 2021: 57 ML/MIN/1.73M*2
ERYTHROCYTE [DISTWIDTH] IN BLOOD BY AUTOMATED COUNT: 13.8 % (ref 11.5–14.5)
GLUCOSE BLD MANUAL STRIP-MCNC: 160 MG/DL (ref 74–99)
GLUCOSE BLD MANUAL STRIP-MCNC: 183 MG/DL (ref 74–99)
GLUCOSE BLD MANUAL STRIP-MCNC: 197 MG/DL (ref 74–99)
GLUCOSE SERPL-MCNC: 171 MG/DL (ref 74–99)
HCT VFR BLD AUTO: 24.2 % (ref 36–46)
HGB BLD-MCNC: 8 G/DL (ref 12–16)
MAGNESIUM SERPL-MCNC: 1.7 MG/DL (ref 1.6–2.4)
MCH RBC QN AUTO: 30.5 PG (ref 26–34)
MCHC RBC AUTO-ENTMCNC: 33.1 G/DL (ref 32–36)
MCV RBC AUTO: 92 FL (ref 80–100)
NRBC BLD-RTO: 0 /100 WBCS (ref 0–0)
PLATELET # BLD AUTO: 231 X10*3/UL (ref 150–450)
POTASSIUM SERPL-SCNC: 4.1 MMOL/L (ref 3.5–5.3)
RBC # BLD AUTO: 2.62 X10*6/UL (ref 4–5.2)
SODIUM SERPL-SCNC: 135 MMOL/L (ref 136–145)
WBC # BLD AUTO: 10.8 X10*3/UL (ref 4.4–11.3)

## 2024-06-18 PROCEDURE — 85027 COMPLETE CBC AUTOMATED: CPT | Performed by: FAMILY MEDICINE

## 2024-06-18 PROCEDURE — 97530 THERAPEUTIC ACTIVITIES: CPT | Mod: GP,CQ

## 2024-06-18 PROCEDURE — 2500000002 HC RX 250 W HCPCS SELF ADMINISTERED DRUGS (ALT 637 FOR MEDICARE OP, ALT 636 FOR OP/ED): Performed by: NURSE PRACTITIONER

## 2024-06-18 PROCEDURE — 97535 SELF CARE MNGMENT TRAINING: CPT | Mod: GO,CO

## 2024-06-18 PROCEDURE — 97530 THERAPEUTIC ACTIVITIES: CPT | Mod: GO,CO

## 2024-06-18 PROCEDURE — 99239 HOSP IP/OBS DSCHRG MGMT >30: CPT | Performed by: FAMILY MEDICINE

## 2024-06-18 PROCEDURE — 36415 COLL VENOUS BLD VENIPUNCTURE: CPT | Performed by: FAMILY MEDICINE

## 2024-06-18 PROCEDURE — 80048 BASIC METABOLIC PNL TOTAL CA: CPT | Performed by: FAMILY MEDICINE

## 2024-06-18 PROCEDURE — 82947 ASSAY GLUCOSE BLOOD QUANT: CPT

## 2024-06-18 PROCEDURE — 2500000004 HC RX 250 GENERAL PHARMACY W/ HCPCS (ALT 636 FOR OP/ED): Performed by: NURSE PRACTITIONER

## 2024-06-18 PROCEDURE — 83735 ASSAY OF MAGNESIUM: CPT | Performed by: FAMILY MEDICINE

## 2024-06-18 PROCEDURE — 2500000001 HC RX 250 WO HCPCS SELF ADMINISTERED DRUGS (ALT 637 FOR MEDICARE OP): Performed by: NURSE PRACTITIONER

## 2024-06-18 RX ORDER — AMLODIPINE BESYLATE 10 MG/1
10 TABLET ORAL DAILY
Start: 2024-06-19

## 2024-06-18 RX ORDER — CEFDINIR 300 MG/1
300 CAPSULE ORAL 2 TIMES DAILY
Start: 2024-06-18 | End: 2024-06-21

## 2024-06-18 ASSESSMENT — COGNITIVE AND FUNCTIONAL STATUS - GENERAL
MOVING TO AND FROM BED TO CHAIR: A LOT
CLIMB 3 TO 5 STEPS WITH RAILING: TOTAL
MOBILITY SCORE: 12
MOVING TO AND FROM BED TO CHAIR: A LOT
DRESSING REGULAR LOWER BODY CLOTHING: TOTAL
DRESSING REGULAR UPPER BODY CLOTHING: A LOT
MOBILITY SCORE: 10
HELP NEEDED FOR BATHING: A LOT
TOILETING: A LOT
WALKING IN HOSPITAL ROOM: TOTAL
TURNING FROM BACK TO SIDE WHILE IN FLAT BAD: A LOT
TURNING FROM BACK TO SIDE WHILE IN FLAT BAD: A LITTLE
PERSONAL GROOMING: A LITTLE
STANDING UP FROM CHAIR USING ARMS: A LOT
TOILETING: A LOT
EATING MEALS: A LITTLE
STANDING UP FROM CHAIR USING ARMS: A LOT
HELP NEEDED FOR BATHING: A LOT
WALKING IN HOSPITAL ROOM: TOTAL
CLIMB 3 TO 5 STEPS WITH RAILING: TOTAL
DRESSING REGULAR LOWER BODY CLOTHING: TOTAL
PERSONAL GROOMING: A LITTLE
DAILY ACTIVITIY SCORE: 13
EATING MEALS: A LITTLE
MOVING FROM LYING ON BACK TO SITTING ON SIDE OF FLAT BED WITH BEDRAILS: A LOT
DRESSING REGULAR UPPER BODY CLOTHING: A LOT
MOVING FROM LYING ON BACK TO SITTING ON SIDE OF FLAT BED WITH BEDRAILS: A LITTLE
DAILY ACTIVITIY SCORE: 13

## 2024-06-18 ASSESSMENT — PAIN - FUNCTIONAL ASSESSMENT
PAIN_FUNCTIONAL_ASSESSMENT: 0-10
PAIN_FUNCTIONAL_ASSESSMENT: 0-10

## 2024-06-18 ASSESSMENT — PAIN SCALES - GENERAL
PAINLEVEL_OUTOF10: 0 - NO PAIN

## 2024-06-18 ASSESSMENT — ACTIVITIES OF DAILY LIVING (ADL): HOME_MANAGEMENT_TIME_ENTRY: 10

## 2024-06-18 NOTE — PROGRESS NOTES
Physical Therapy    Physical Therapy Treatment    Patient Name: Joseline Umana  MRN: [id number]  Today's Date: 6/18/2024  Time Calculation  Start Time: 1105  Stop Time: 1140  Time Calculation (min): 35 min    Assessment/Plan   PT Assessment  PT Assessment Results: Decreased strength, Decreased range of motion, Decreased endurance, Impaired balance, Impaired judgement, Decreased safety awareness, Decreased mobility  Rehab Prognosis: Fair  Evaluation/Treatment Tolerance: Patient limited by fatigue  End of Session Communication: Bedside nurse  Assessment Comment: pt's daughter present. pt stood with FWW and cues on hand placement with transfers.pt had difficulty with processing using FWW, weight shifting, and taking steps. pt sat down and took a rst break. pt hten stood and transfered with no AD ad was shuffling bilat LE to chair. after a rest break pt worked on standing form chair 3x with cues to extend posture and weight shift.  End of Session Patient Position: Up in chair, Alarm on     PT Plan  Treatment/Interventions: Bed mobility, Transfer training, Gait training, Balance training, Strengthening, Range of motion, Endurance training, Therapeutic exercise, Therapeutic activity, Positioning, Neuromuscular re-education  PT Plan: Ongoing PT  PT Frequency: 5 times per week      General Visit Information:   PT  Visit  PT Received On: 06/18/24       Subjective   Precautions:     Vital Signs:       Objective   Pain:  Pain Assessment  Pain Score: 0 - No pain  Cognition:  Cognition  Orientation Level: Disoriented to situation  Coordination:          Treatments:  Therapeutic Exercise  Therapeutic Exercise Activity 1: LAQ, ankle pumps x10 ea    Bed Mobility 1  Bed Mobility 1: Supine to sitting  Level of Assistance 1: Moderate assistance (x2)    Ambulation/Gait Training 1  Device 1:  (arm in arm)  Assistance 1: Moderate assistance (x2)  Quality of Gait 1: Diminished heel strike, Narrow base of support, Decreased step length,  Inconsistent stride length, Shuffling gait  Comments/Distance (ft) 1: 3  Transfer 1  Technique 1: Sit to stand, Stand to sit  Transfer Device 1: Walker  Transfer Level of Assistance 1: Moderate assistance (x2)  Trials/Comments 1: x2 EOB, x3 chair    Outcome Measures:  [facility] Basic Mobility  Turning from your back to your side while in a flat bed without using bedrails: A lot  Moving from lying on your back to sitting on the side of a flat bed without using bedrails: A lot  Moving to and from bed to chair (including a wheelchair): A lot  Standing up from a chair using your arms (e.g. wheelchair or bedside chair): A lot  To walk in hospital room: Total  Climbing 3-5 steps with railing: Total  Basic Mobility - Total Score: 10    Education Documentation  Mobility Training, taught by Andree Stearns PTA at 6/18/2024  1:29 PM.  Learner: Patient  Readiness: Acceptance  Method: Explanation  Response: Verbalizes Understanding    Education Comments  No comments found.        OP EDUCATION:       Encounter Problems       Encounter Problems (Active)       Mobility       STG - Patient will ambulate 20 ft with 2WW and Mod A (Progressing)       Start:  06/15/24    Expected End:  06/29/24               PT Transfers       STG - Patient to transfer to and from sit to supine Mod A x1 (Progressing)       Start:  06/15/24    Expected End:  06/29/24            STG - Patient will transfer sit to and from stand with Close S and FWW (Progressing)       Start:  06/15/24    Expected End:  06/29/24

## 2024-06-18 NOTE — CARE PLAN
Problem: Skin  Goal: Decreased wound size/increased tissue granulation at next dressing change  6/17/2024 2225 by Pita Alexis RN  Flowsheets (Taken 6/17/2024 2225)  Decreased wound size/increased tissue granulation at next dressing change: Promote sleep for wound healing  6/17/2024 2225 by Pita Alexis RN  Outcome: Progressing  Flowsheets (Taken 6/17/2024 2225)  Decreased wound size/increased tissue granulation at next dressing change: Promote sleep for wound healing  Goal: Participates in plan/prevention/treatment measures  Outcome: Progressing  Goal: Prevent/manage excess moisture  Outcome: Progressing  Goal: Prevent/minimize sheer/friction injuries  Outcome: Progressing  Goal: Promote/optimize nutrition  Outcome: Progressing  Goal: Promote skin healing  Outcome: Progressing     Problem: Fall/Injury  Goal: Be free from injury by end of the shift  Outcome: Progressing  Goal: Verbalize understanding of personal risk factors for fall in the hospital  Outcome: Progressing  Goal: Verbalize understanding of risk factor reduction measures to prevent injury from fall in the home  Outcome: Progressing  Goal: Use assistive devices by end of the shift  Outcome: Progressing  Goal: Pace activities to prevent fatigue by end of the shift  Outcome: Progressing     Problem: Pain  Goal: Takes deep breaths with improved pain control throughout the shift  Outcome: Progressing  Goal: Turns in bed with improved pain control throughout the shift  Outcome: Progressing  Goal: Walks with improved pain control throughout the shift  Outcome: Progressing  Goal: Performs ADL's with improved pain control throughout shift  Outcome: Progressing  Goal: Participates in PT with improved pain control throughout the shift  Outcome: Progressing  Goal: Free from opioid side effects throughout the shift  Outcome: Progressing  Goal: Free from acute confusion related to pain meds throughout the shift  Outcome: Progressing

## 2024-06-18 NOTE — PROGRESS NOTES
Occupational Therapy    OT Treatment    Patient Name: Joseline Umana  MRN: [id number]  Today's Date: 6/18/2024  Time Calculation  Start Time: 1106  Stop Time: 1140  Time Calculation (min): 34 min         Assessment:  End of Session Communication: Bedside nurse, PCT/NA/CTA  End of Session Patient Position: Up in chair, Alarm on     Plan:  Treatment Interventions: Functional transfer training, Endurance training  OT Frequency: 4 times per week  OT Discharge Recommendations: High intensity level of continued care  OT Recommended Transfer Status: Assist of 2  OT - OK to Discharge: Yes ((when medically approp.))  Treatment Interventions: Functional transfer training, Endurance training    Subjective   Previous Visit Info:  OT Last Visit  OT Received On: 06/18/24  General:  General  Family/Caregiver Present: Yes  Caregiver Feedback: daughter  Co-Treatment: PT  Co-Treatment Reason: to maximize safe mobility during functional assessment  Prior to Session Communication: Bedside nurse  Patient Position Received: Bed, 3 rail up, Alarm on  General Comment: pt agreeable to OT  tx session  Precautions:     Vital Signs:     Pain:  Pain Assessment  Pain Assessment: 0-10  Pain Score: 0 - No pain    Objective    Cognition:  Cognition  Orientation Level: Disoriented to situation  Coordination:     Activities of Daily Living: Grooming  Grooming Level of Assistance: Minimum assistance  Grooming Where Assessed: Edge of bed  Grooming Comments: cga-sba for dynamic sitting balance  Functional Standing Tolerance:  Time: 50-60 sec standing bouts  Activity: minAx2 and FWW, tolerated stands fairly, no LOB however unsteady  Bed Mobility/Transfers: Bed Mobility  Bed Mobility: Yes  Bed Mobility 1  Bed Mobility 1: Supine to sitting, Scooting  Level of Assistance 1: Moderate assistance, +2    Transfers  Transfer: Yes  Transfer 1  Transfer From 1: Bed to  Transfer to 1: Stand  Technique 1: Sit to stand, Stand to sit  Transfer Device 1:  Walker  Transfer Level of Assistance 1: Moderate assistance, Minimal tactile cues, Moderate verbal cues  Trials/Comments 1: x2 STS from EOB  Transfers 2  Transfer From 2: Bed to  Transfer to 2: Chair with arms  Technique 2: Stand pivot  Transfer Level of Assistance 2: Arm in arm assistance, +2, Moderate assistance  Trials/Comments 2: pt demos difficulty with initating steps and sequencing required mod-max verbal and tactile cues to improve safety and proper technique with t/fs    Sitting Balance:  Static Sitting Balance  Static Sitting-Balance Support: Feet supported, Bilateral upper extremity supported  Static Sitting-Level of Assistance: Close supervision  Dynamic Sitting Balance  Dynamic Sitting-Balance Support: Feet supported  Dynamic Sitting-Comments: cga  Standing Balance:  Static Standing Balance  Static Standing-Balance Support: Bilateral upper extremity supported  Static Standing-Level of Assistance: Minimum assistance (x2)  Dynamic Standing Balance  Dynamic Standing-Balance Support: Left upper extremity supported, Right upper extremity supported  Dynamic Standing-Comments: therapist implemented functional reaching through various planes to challenge dynamic standing balance and increase strength/balance required for dynamic standing ADLs    Therapy/Activity: Balance/Neuromuscular Re-Education  Balance/Neuromuscular Re-Education Activity Performed: Yes  Balance/Neuromuscular Re-Education Activity 1: pt sat EOB for ~7-8 mins      Outcome Measures:[facility] Daily Activity  Putting on and taking off regular lower body clothing: Total  Bathing (including washing, rinsing, drying): A lot  Putting on and taking off regular upper body clothing: A lot  Toileting, which includes using toilet, bedpan or urinal: A lot  Taking care of personal grooming such as brushing teeth: A little  Eating Meals: A little  Daily Activity - Total Score: 13        Education Documentation  ADL Training, taught by ALETA Tatum at  6/18/2024 12:47 PM.  Learner: Patient  Readiness: Acceptance  Method: Explanation  Response: Verbalizes Understanding, Needs Reinforcement    Body Mechanics, taught by ALETA Tatum at 6/18/2024 12:47 PM.  Learner: Patient  Readiness: Acceptance  Method: Explanation  Response: Verbalizes Understanding, Needs Reinforcement    Home Exercise Program, taught by ALETA Tatum at 6/18/2024 12:47 PM.  Learner: Patient  Readiness: Acceptance  Method: Explanation  Response: Verbalizes Understanding, Needs Reinforcement    Precautions, taught by ALETA Tatum at 6/18/2024 12:47 PM.  Learner: Patient  Readiness: Acceptance  Method: Explanation  Response: Verbalizes Understanding, Needs Reinforcement    Education Comments  No comments found.        OP EDUCATION:       Goals:  Encounter Problems       Encounter Problems (Active)       ADLs       Demo. UB bathing, grooming while seated EOB with SBA.  (Progressing)       Start:  06/15/24    Expected End:  06/22/24               BALANCE       Sonia. at least 3 mins. dyn. stand with CGA and FWW (Progressing)       Start:  06/15/24    Expected End:  06/22/24               TRANSFERS       Min.A func. trfrs. with FWW  (Progressing)       Start:  06/15/24    Expected End:  06/22/24

## 2024-06-18 NOTE — DISCHARGE SUMMARY
Discharge Diagnosis  Closed nondisplaced fracture of greater trochanter of left femur, initial encounter (Multi)    Issues Requiring Follow-Up  Left greater trochanteric fracture, generalized weakness with impaired ambulation, UTI    This discharge took greater than 35 minutes.    Test Results Pending At Discharge  Pending Labs       Order Current Status    Blood Culture Preliminary result    Blood Culture Preliminary result            Hospital Course   89 y.o. female with PMHx s/f HTN,  presenting with back pain after a fall. Pt lives a lone but has daughters nearby that check on her frequently. Evidently she lost her balance throwing a bag of trash into a can. She fell to the ground injuring her back and was unable to get off the ground, she was found half a day later and brought to the hospital by ambulance.  She denies loss of consciousness, cp, palpitations, near syncope, fever chills. On arrival to the ED pt had T 98,  RR18 and bp 140/92, heart rate improved with analgesia. Pt found to have UTI given 1 G of rocephin.  CBC showing wbc 14.5 Hbg 10.7, platelets 254.  Glucose 281 BUN 29 cratinin is normal ck 329.  CT of the head was negative for intracranial hemorrhage stroke mass or e or acute finding.  CT of the cervical spine was negative for any acute fracture there are degenerative changes noted however.  Shoulder and elbow present without obvious fracture x-ray of the left hip and pelvis appears to show a nondisplaced fracture through the left femoral greater trochanter.  The ED physician discussed these findings with orthopedic surgery on-call who advised that at this point it does not appear to be a surgical case however he also advised to get a CT scan of the of the pelvis, patient is to be admitted to continue treating her urinary tract infection control her pain mobilize the patient with physical therapy and determine a safe disposition for her recuperation.      6/15/24: No pain at rest, CK  slightly more elevated will plan to continue hydration. Anemia also worsened could be dilutional or loss from fracture, will check iron studies. Seen by Ortho, no plans for surgery. Will attempt to mobilize and evaluate pain. Will need long term vte prophylaxis.      06/16/24 Pt with tachycardia, elevated bp, leukocytosis. Will activate Sepsis alert. Continue treatment for UTI, check blood cultures, LFT, lactate, Give a little extra fluid to improve urine output. Will also recheck CXR pt could have pneumonia not seen at admission due to poor volume status. Will also check blood cultures. Pt wanted to go home. CK now trending down, anemia appears stable.      6/17:                Today the patient is awake and interactive appropriately.  She is only complaining of left wrist and hand discomfort.  Nurses have noted some swelling and discoloration as well.  Initially on visit appears sleeping but easily awakens to name.  Urine culture growing greater than 100,000 and E. coli pansensitive and continues on Rocephin.  AM-PAC 11 and working towards acute rehab.  As her forearm wrist and hand had not been imaged on admission we checked x-rays of these today which reveal no acute findings but multifocal osteoarthritis most severe at the base of the thumb with severe chondrocalcinosis suggestive of CPPD.    6/18:               Today patient remains awake alert and interactive appropriately.  Actually is much more awake and interactive than yesterday.  In addition she is demonstrating reasonably good movement of the left wrist and hand.  Authorization obtained for discharge to SNF.  She will continue on Omnicef to complete course of treatment for UTI.  She will need to follow-up with Dr. Meza orthopedic surgeon in 3 weeks.                 Review of Systems   Constitutional:  Negative for chills and fever.   Respiratory:  Negative for shortness of breath.    Cardiovascular:  Negative for chest pain.   Gastrointestinal:   Negative for abdominal distention, abdominal pain and nausea.   Genitourinary:  Negative for dysuria, flank pain and frequency.       Status post fall with left greater trochanter fracture not extending   Patient did have CT of the pelvis, MRI of pelvis  Seen by ortho not felt to be surgical candidate  Will continue analgesics, PT evaluation  Dr Meza would like to see again 3 weeks to recheck,  6/17: Working towards discharge to acute rehab.     Contusion to the left elbow  OT evaluation     Contusion/?  Sprain left wrist  6/17: She does have mild to moderate edema about the wrist and dorsum of the hand.  X-ray reveals no fractures but she has severe OA involving base of the thumb as well as severe chondrocalcinosis.  Symptomatic care.     UTI enteric bacilli on cx  Continue antibiotics  Check blood cultures  6/17: Urine culture growing greater than 100,000 E. coli pansensitive.  6/18: Will continue Omnicef to complete course of treatment.     HTN  Continue home medications  Consider increasing amlodipine     DM2  Cover w sliding scale insulin  A1c is 6.8 will continue carb controlled diet     Elevated CK due to patient fall  Is improved     Anemia,  stable  Will continue to monitor    Pertinent Physical Exam At Time of Discharge  Physical Exam  Constitutional:       General: She is not in acute distress.     Comments: Slender elderly  female awake alert and interactive appropriately   HENT:      Head: Normocephalic and atraumatic.      Right Ear: External ear normal.      Left Ear: External ear normal.      Nose: Nose normal.      Mouth/Throat:      Mouth: Mucous membranes are moist.      Pharynx: Oropharynx is clear.   Eyes:      Extraocular Movements: Extraocular movements intact.      Conjunctiva/sclera: Conjunctivae normal.      Pupils: Pupils are equal, round, and reactive to light.   Cardiovascular:      Rate and Rhythm: Normal rate and regular rhythm.      Pulses: Normal pulses.      Heart sounds:  Normal heart sounds. No murmur heard.  Pulmonary:      Effort: Pulmonary effort is normal. No respiratory distress.      Breath sounds: Normal breath sounds. No wheezing, rhonchi or rales.   Chest:      Chest wall: No tenderness.   Abdominal:      General: Bowel sounds are normal. There is no distension.      Palpations: Abdomen is soft. There is no mass.      Tenderness: There is no abdominal tenderness. There is no rebound.   Musculoskeletal:         General: No deformity. Normal range of motion.      Cervical back: Normal range of motion.      Right lower leg: Edema present.      Left lower leg: No edema.      Comments: Attention to the left upper extremity reveals she has moderate edema overlying the wrist and dorsum of the left hand.  There is mild erythema/ecchymoses present.  No obvious deformity.  Tender on palpation especially about the base of the thumb.  No crepitus with range of motion.  She does have mild to moderate  left hand.  Neurovascularly intact.   Skin:     General: Skin is warm and dry.      Capillary Refill: Capillary refill takes less than 2 seconds.   Neurological:      General: No focal deficit present.      Mental Status: She is alert and oriented to person, place, and time.   Psychiatric:         Mood and Affect: Mood normal.         Behavior: Behavior normal.      Home Medications     Medication List      START taking these medications     amLODIPine 10 mg tablet; Commonly known as: Norvasc; Take 1 tablet (10   mg) by mouth once daily.; Start taking on: June 19, 2024   cefdinir 300 mg capsule; Commonly known as: Omnicef; Take 1 capsule (300   mg) by mouth 2 times a day for 3 days.     CONTINUE taking these medications     atorvastatin 40 mg tablet; Commonly known as: Lipitor   levothyroxine 112 mcg capsule; Commonly known as: Tirosint   losartan 50 mg tablet; Commonly known as: Cozaar   pantoprazole 40 mg EC tablet; Commonly known as: ProtoNix   phenytoin  mg capsule; Commonly  known as: Dilantin       Outpatient Follow-Up  PCP, orthopedic surgery    Louis Patino MD

## 2024-06-18 NOTE — NURSING NOTE
Called report to Neris at [facility], no further questions at this time,  time set up for 8pm.

## 2024-06-18 NOTE — PROGRESS NOTES
Attepted to discuss AR choices for patient. She is now confused, only oriented to self and place. Requested permission to speak to her daughter which she agreed to. Voicemail left for daughter to discuss discharge planning, requested return call.     1250 Spoke to patient whom is now oriented x4. Patient would like to discharge to a SNF. Patient has been recommended for Skilled Nursing Facility. Provided skilled nursing list to patient from [facility] directory that includes facilities that are within  [facility], as well as meeting patient's medical needs, and are in-network for patient's insurance; while also in discharge geographic area patient prefers, and identifies each facilities CMS star rating.     1330 Spoke to patient whom provided SNF choices of [facility] and [facility]. Will request [facility] send referrals.      1525 [facility]ified with [facility] that [facility]. [facility].

## 2024-06-20 ENCOUNTER — LAB REQUISITION (OUTPATIENT)
Dept: LAB | Facility: HOSPITAL | Age: 89
End: 2024-06-20
Payer: MEDICARE

## 2024-06-20 DIAGNOSIS — M84.453S: ICD-10-CM

## 2024-06-20 LAB
ANION GAP SERPL CALC-SCNC: 13 MMOL/L (ref 10–20)
ATRIAL RATE: 104 BPM
BACTERIA BLD CULT: NORMAL
BACTERIA BLD CULT: NORMAL
BASOPHILS # BLD AUTO: 0.02 X10*3/UL (ref 0–0.1)
BASOPHILS NFR BLD AUTO: 0.2 %
BUN SERPL-MCNC: 53 MG/DL (ref 6–23)
CALCIUM SERPL-MCNC: 8.3 MG/DL (ref 8.6–10.3)
CHLORIDE SERPL-SCNC: 100 MMOL/L (ref 98–107)
CO2 SERPL-SCNC: 23 MMOL/L (ref 21–32)
CREAT SERPL-MCNC: 0.76 MG/DL (ref 0.5–1.05)
EGFRCR SERPLBLD CKD-EPI 2021: 74 ML/MIN/1.73M*2
EOSINOPHIL # BLD AUTO: 0.19 X10*3/UL (ref 0–0.4)
EOSINOPHIL NFR BLD AUTO: 2.3 %
ERYTHROCYTE [DISTWIDTH] IN BLOOD BY AUTOMATED COUNT: 13.6 % (ref 11.5–14.5)
GLUCOSE SERPL-MCNC: 137 MG/DL (ref 74–99)
HCT VFR BLD AUTO: 23.6 % (ref 36–46)
HGB BLD-MCNC: 7.9 G/DL (ref 12–16)
IMM GRANULOCYTES # BLD AUTO: 0.1 X10*3/UL (ref 0–0.5)
IMM GRANULOCYTES NFR BLD AUTO: 1.2 % (ref 0–0.9)
LYMPHOCYTES # BLD AUTO: 0.73 X10*3/UL (ref 0.8–3)
LYMPHOCYTES NFR BLD AUTO: 8.7 %
MCH RBC QN AUTO: 31 PG (ref 26–34)
MCHC RBC AUTO-ENTMCNC: 33.5 G/DL (ref 32–36)
MCV RBC AUTO: 93 FL (ref 80–100)
MONOCYTES # BLD AUTO: 1.03 X10*3/UL (ref 0.05–0.8)
MONOCYTES NFR BLD AUTO: 12.3 %
NEUTROPHILS # BLD AUTO: 6.32 X10*3/UL (ref 1.6–5.5)
NEUTROPHILS NFR BLD AUTO: 75.3 %
NRBC BLD-RTO: 0 /100 WBCS (ref 0–0)
P AXIS: 65 DEGREES
PLATELET # BLD AUTO: 309 X10*3/UL (ref 150–450)
POTASSIUM SERPL-SCNC: 4.2 MMOL/L (ref 3.5–5.3)
PR INTERVAL: 173 MS
Q ONSET: 252 MS
QRS COUNT: 17 BEATS
QRS DURATION: 83 MS
QT INTERVAL: 397 MS
QTC CALCULATION(BAZETT): 525 MS
QTC FREDERICIA: 478 MS
R AXIS: 10 DEGREES
RBC # BLD AUTO: 2.55 X10*6/UL (ref 4–5.2)
SODIUM SERPL-SCNC: 132 MMOL/L (ref 136–145)
T OFFSET: 451 MS
VENTRICULAR RATE: 105 BPM
WBC # BLD AUTO: 8.4 X10*3/UL (ref 4.4–11.3)

## 2024-06-20 PROCEDURE — 85025 COMPLETE CBC W/AUTO DIFF WBC: CPT

## 2024-06-20 PROCEDURE — 80048 BASIC METABOLIC PNL TOTAL CA: CPT

## 2024-06-27 ENCOUNTER — LAB REQUISITION (OUTPATIENT)
Dept: LAB | Facility: HOSPITAL | Age: 89
End: 2024-06-27
Payer: MEDICARE

## 2024-06-27 DIAGNOSIS — S72.115D NONDISPLACED FRACTURE OF GREATER TROCHANTER OF LEFT FEMUR, SUBSEQUENT ENCOUNTER FOR CLOSED FRACTURE WITH ROUTINE HEALING: ICD-10-CM

## 2024-06-27 LAB
ERYTHROCYTE [DISTWIDTH] IN BLOOD BY AUTOMATED COUNT: 14.5 % (ref 11.5–14.5)
HCT VFR BLD AUTO: 24 % (ref 36–46)
HGB BLD-MCNC: 7.7 G/DL (ref 12–16)
MCH RBC QN AUTO: 30.1 PG (ref 26–34)
MCHC RBC AUTO-ENTMCNC: 32.1 G/DL (ref 32–36)
MCV RBC AUTO: 94 FL (ref 80–100)
NRBC BLD-RTO: 0 /100 WBCS (ref 0–0)
PLATELET # BLD AUTO: 461 X10*3/UL (ref 150–450)
RBC # BLD AUTO: 2.56 X10*6/UL (ref 4–5.2)
WBC # BLD AUTO: 6.1 X10*3/UL (ref 4.4–11.3)

## 2024-06-27 PROCEDURE — 85027 COMPLETE CBC AUTOMATED: CPT | Mod: OUT | Performed by: NURSE PRACTITIONER

## 2024-07-01 ENCOUNTER — LAB REQUISITION (OUTPATIENT)
Dept: LAB | Facility: HOSPITAL | Age: 89
End: 2024-07-01
Payer: MEDICARE

## 2024-07-01 DIAGNOSIS — S72.115D NONDISPLACED FRACTURE OF GREATER TROCHANTER OF LEFT FEMUR, SUBSEQUENT ENCOUNTER FOR CLOSED FRACTURE WITH ROUTINE HEALING: ICD-10-CM

## 2024-07-01 LAB
ANION GAP SERPL CALC-SCNC: 14 MMOL/L (ref 10–20)
BUN SERPL-MCNC: 29 MG/DL (ref 6–23)
CALCIUM SERPL-MCNC: 7.9 MG/DL (ref 8.6–10.3)
CHLORIDE SERPL-SCNC: 104 MMOL/L (ref 98–107)
CO2 SERPL-SCNC: 23 MMOL/L (ref 21–32)
CREAT SERPL-MCNC: 0.75 MG/DL (ref 0.5–1.05)
EGFRCR SERPLBLD CKD-EPI 2021: 75 ML/MIN/1.73M*2
ERYTHROCYTE [DISTWIDTH] IN BLOOD BY AUTOMATED COUNT: 14.6 % (ref 11.5–14.5)
GLUCOSE SERPL-MCNC: 118 MG/DL (ref 74–99)
HCT VFR BLD AUTO: 25.7 % (ref 36–46)
HGB BLD-MCNC: 8.3 G/DL (ref 12–16)
MCH RBC QN AUTO: 30.2 PG (ref 26–34)
MCHC RBC AUTO-ENTMCNC: 32.3 G/DL (ref 32–36)
MCV RBC AUTO: 94 FL (ref 80–100)
NRBC BLD-RTO: 0 /100 WBCS (ref 0–0)
PLATELET # BLD AUTO: 365 X10*3/UL (ref 150–450)
POTASSIUM SERPL-SCNC: 4.5 MMOL/L (ref 3.5–5.3)
RBC # BLD AUTO: 2.75 X10*6/UL (ref 4–5.2)
SODIUM SERPL-SCNC: 136 MMOL/L (ref 136–145)
WBC # BLD AUTO: 5.1 X10*3/UL (ref 4.4–11.3)

## 2024-07-01 PROCEDURE — 82374 ASSAY BLOOD CARBON DIOXIDE: CPT | Mod: OUT | Performed by: NURSE PRACTITIONER

## 2024-07-01 PROCEDURE — 85027 COMPLETE CBC AUTOMATED: CPT | Mod: OUT | Performed by: NURSE PRACTITIONER

## 2024-07-05 ENCOUNTER — LAB REQUISITION (OUTPATIENT)
Dept: LAB | Facility: HOSPITAL | Age: 89
End: 2024-07-05
Payer: MEDICARE

## 2024-07-05 DIAGNOSIS — I10 ESSENTIAL (PRIMARY) HYPERTENSION: ICD-10-CM

## 2024-07-05 LAB
ERYTHROCYTE [DISTWIDTH] IN BLOOD BY AUTOMATED COUNT: 14.9 % (ref 11.5–14.5)
HCT VFR BLD AUTO: 26.5 % (ref 36–46)
HGB BLD-MCNC: 8.5 G/DL (ref 12–16)
MCH RBC QN AUTO: 30.5 PG (ref 26–34)
MCHC RBC AUTO-ENTMCNC: 32.1 G/DL (ref 32–36)
MCV RBC AUTO: 95 FL (ref 80–100)
NRBC BLD-RTO: 0 /100 WBCS (ref 0–0)
PLATELET # BLD AUTO: 246 X10*3/UL (ref 150–450)
RBC # BLD AUTO: 2.79 X10*6/UL (ref 4–5.2)
WBC # BLD AUTO: 4.8 X10*3/UL (ref 4.4–11.3)

## 2024-07-05 PROCEDURE — 85027 COMPLETE CBC AUTOMATED: CPT | Mod: OUT | Performed by: INTERNAL MEDICINE

## 2024-07-05 PROCEDURE — 36415 COLL VENOUS BLD VENIPUNCTURE: CPT | Performed by: INTERNAL MEDICINE

## 2024-07-30 ENCOUNTER — APPOINTMENT (OUTPATIENT)
Dept: CARDIOLOGY | Facility: HOSPITAL | Age: 89
End: 2024-07-30
Payer: MEDICARE

## 2024-07-30 ENCOUNTER — HOSPITAL ENCOUNTER (INPATIENT)
Facility: HOSPITAL | Age: 89
LOS: 3 days | Discharge: NURSING, REHAB OR LONG-TERM CARE | End: 2024-08-02
Attending: EMERGENCY MEDICINE | Admitting: INTERNAL MEDICINE
Payer: MEDICARE

## 2024-07-30 ENCOUNTER — APPOINTMENT (OUTPATIENT)
Dept: RADIOLOGY | Facility: HOSPITAL | Age: 89
End: 2024-07-30
Payer: MEDICARE

## 2024-07-30 DIAGNOSIS — S72.142A CLOSED DISPLACED INTERTROCHANTERIC FRACTURE OF LEFT FEMUR, INITIAL ENCOUNTER (MULTI): Primary | ICD-10-CM

## 2024-07-30 LAB
ALBUMIN SERPL BCP-MCNC: 4 G/DL (ref 3.4–5)
ALP SERPL-CCNC: 108 U/L (ref 33–136)
ALT SERPL W P-5'-P-CCNC: 9 U/L (ref 7–45)
ANION GAP SERPL CALC-SCNC: 15 MMOL/L (ref 10–20)
APTT PPP: 31 SECONDS (ref 27–38)
AST SERPL W P-5'-P-CCNC: 15 U/L (ref 9–39)
BASOPHILS # BLD AUTO: 0.03 X10*3/UL (ref 0–0.1)
BASOPHILS NFR BLD AUTO: 0.3 %
BILIRUB SERPL-MCNC: 0.5 MG/DL (ref 0–1.2)
BUN SERPL-MCNC: 43 MG/DL (ref 6–23)
CALCIUM SERPL-MCNC: 9.5 MG/DL (ref 8.6–10.3)
CHLORIDE SERPL-SCNC: 100 MMOL/L (ref 98–107)
CO2 SERPL-SCNC: 22 MMOL/L (ref 21–32)
CREAT SERPL-MCNC: 0.8 MG/DL (ref 0.5–1.05)
EGFRCR SERPLBLD CKD-EPI 2021: 69 ML/MIN/1.73M*2
EOSINOPHIL # BLD AUTO: 0.05 X10*3/UL (ref 0–0.4)
EOSINOPHIL NFR BLD AUTO: 0.5 %
ERYTHROCYTE [DISTWIDTH] IN BLOOD BY AUTOMATED COUNT: 13.8 % (ref 11.5–14.5)
GLUCOSE SERPL-MCNC: 115 MG/DL (ref 74–99)
HCT VFR BLD AUTO: 32.1 % (ref 36–46)
HGB BLD-MCNC: 10.6 G/DL (ref 12–16)
IMM GRANULOCYTES # BLD AUTO: 0.04 X10*3/UL (ref 0–0.5)
IMM GRANULOCYTES NFR BLD AUTO: 0.4 % (ref 0–0.9)
INR PPP: 1 (ref 0.9–1.1)
LYMPHOCYTES # BLD AUTO: 0.8 X10*3/UL (ref 0.8–3)
LYMPHOCYTES NFR BLD AUTO: 7.6 %
MCH RBC QN AUTO: 30.7 PG (ref 26–34)
MCHC RBC AUTO-ENTMCNC: 33 G/DL (ref 32–36)
MCV RBC AUTO: 93 FL (ref 80–100)
MONOCYTES # BLD AUTO: 0.79 X10*3/UL (ref 0.05–0.8)
MONOCYTES NFR BLD AUTO: 7.5 %
NEUTROPHILS # BLD AUTO: 8.78 X10*3/UL (ref 1.6–5.5)
NEUTROPHILS NFR BLD AUTO: 83.7 %
NRBC BLD-RTO: 0 /100 WBCS (ref 0–0)
PLATELET # BLD AUTO: 268 X10*3/UL (ref 150–450)
POTASSIUM SERPL-SCNC: 4.7 MMOL/L (ref 3.5–5.3)
PROT SERPL-MCNC: 7.2 G/DL (ref 6.4–8.2)
PROTHROMBIN TIME: 11.7 SECONDS (ref 9.8–12.8)
RBC # BLD AUTO: 3.45 X10*6/UL (ref 4–5.2)
SODIUM SERPL-SCNC: 132 MMOL/L (ref 136–145)
WBC # BLD AUTO: 10.5 X10*3/UL (ref 4.4–11.3)

## 2024-07-30 PROCEDURE — 2500000001 HC RX 250 WO HCPCS SELF ADMINISTERED DRUGS (ALT 637 FOR MEDICARE OP): Performed by: NURSE PRACTITIONER

## 2024-07-30 PROCEDURE — 93005 ELECTROCARDIOGRAM TRACING: CPT

## 2024-07-30 PROCEDURE — 36415 COLL VENOUS BLD VENIPUNCTURE: CPT | Performed by: PHYSICIAN ASSISTANT

## 2024-07-30 PROCEDURE — 99285 EMERGENCY DEPT VISIT HI MDM: CPT | Mod: 25

## 2024-07-30 PROCEDURE — 72170 X-RAY EXAM OF PELVIS: CPT

## 2024-07-30 PROCEDURE — 1210000001 HC SEMI-PRIVATE ROOM DAILY

## 2024-07-30 PROCEDURE — 99222 1ST HOSP IP/OBS MODERATE 55: CPT | Performed by: NURSE PRACTITIONER

## 2024-07-30 PROCEDURE — 85730 THROMBOPLASTIN TIME PARTIAL: CPT | Performed by: PHYSICIAN ASSISTANT

## 2024-07-30 PROCEDURE — 84075 ASSAY ALKALINE PHOSPHATASE: CPT | Performed by: PHYSICIAN ASSISTANT

## 2024-07-30 PROCEDURE — 73552 X-RAY EXAM OF FEMUR 2/>: CPT | Mod: LT

## 2024-07-30 PROCEDURE — 96374 THER/PROPH/DIAG INJ IV PUSH: CPT

## 2024-07-30 PROCEDURE — 2500000004 HC RX 250 GENERAL PHARMACY W/ HCPCS (ALT 636 FOR OP/ED): Performed by: NURSE PRACTITIONER

## 2024-07-30 PROCEDURE — 85610 PROTHROMBIN TIME: CPT | Performed by: PHYSICIAN ASSISTANT

## 2024-07-30 PROCEDURE — 73552 X-RAY EXAM OF FEMUR 2/>: CPT | Performed by: RADIOLOGY

## 2024-07-30 PROCEDURE — 72170 X-RAY EXAM OF PELVIS: CPT | Performed by: RADIOLOGY

## 2024-07-30 PROCEDURE — 85025 COMPLETE CBC W/AUTO DIFF WBC: CPT | Performed by: PHYSICIAN ASSISTANT

## 2024-07-30 RX ORDER — AMLODIPINE BESYLATE 10 MG/1
10 TABLET ORAL DAILY
Status: DISCONTINUED | OUTPATIENT
Start: 2024-07-30 | End: 2024-08-02 | Stop reason: HOSPADM

## 2024-07-30 RX ORDER — LEVOTHYROXINE SODIUM 112 UG/1
112 TABLET ORAL DAILY
Status: DISCONTINUED | OUTPATIENT
Start: 2024-07-30 | End: 2024-08-02 | Stop reason: HOSPADM

## 2024-07-30 RX ORDER — LOSARTAN POTASSIUM 50 MG/1
50 TABLET ORAL DAILY
Status: DISCONTINUED | OUTPATIENT
Start: 2024-07-30 | End: 2024-08-02 | Stop reason: HOSPADM

## 2024-07-30 RX ORDER — TRAMADOL HYDROCHLORIDE 50 MG/1
50 TABLET ORAL EVERY 8 HOURS PRN
Status: DISCONTINUED | OUTPATIENT
Start: 2024-07-30 | End: 2024-08-02 | Stop reason: HOSPADM

## 2024-07-30 RX ORDER — PANTOPRAZOLE SODIUM 40 MG/1
40 TABLET, DELAYED RELEASE ORAL
Status: DISCONTINUED | OUTPATIENT
Start: 2024-07-31 | End: 2024-08-02 | Stop reason: HOSPADM

## 2024-07-30 RX ORDER — ACETAMINOPHEN 325 MG/1
975 TABLET ORAL ONCE
Status: COMPLETED | OUTPATIENT
Start: 2024-07-30 | End: 2024-07-30

## 2024-07-30 RX ORDER — ACETAMINOPHEN 325 MG/1
650 TABLET ORAL EVERY 4 HOURS PRN
Status: DISCONTINUED | OUTPATIENT
Start: 2024-07-30 | End: 2024-08-02 | Stop reason: HOSPADM

## 2024-07-30 RX ORDER — ATORVASTATIN CALCIUM 40 MG/1
40 TABLET, FILM COATED ORAL NIGHTLY
Status: DISCONTINUED | OUTPATIENT
Start: 2024-07-30 | End: 2024-08-02 | Stop reason: HOSPADM

## 2024-07-30 RX ORDER — MORPHINE SULFATE 2 MG/ML
2 INJECTION, SOLUTION INTRAMUSCULAR; INTRAVENOUS EVERY 4 HOURS PRN
Status: DISCONTINUED | OUTPATIENT
Start: 2024-07-30 | End: 2024-08-02 | Stop reason: HOSPADM

## 2024-07-30 RX ORDER — PHENYTOIN SODIUM 100 MG/1
100 CAPSULE, EXTENDED RELEASE ORAL 3 TIMES DAILY
Status: DISCONTINUED | OUTPATIENT
Start: 2024-07-30 | End: 2024-08-02 | Stop reason: HOSPADM

## 2024-07-30 RX ORDER — SENNOSIDES 8.6 MG/1
2 TABLET ORAL 2 TIMES DAILY
Status: DISCONTINUED | OUTPATIENT
Start: 2024-07-30 | End: 2024-08-02 | Stop reason: HOSPADM

## 2024-07-30 SDOH — SOCIAL STABILITY: SOCIAL INSECURITY: ABUSE: ADULT

## 2024-07-30 SDOH — ECONOMIC STABILITY: TRANSPORTATION INSECURITY
IN THE PAST 12 MONTHS, HAS LACK OF TRANSPORTATION KEPT YOU FROM MEETINGS, WORK, OR FROM GETTING THINGS NEEDED FOR DAILY LIVING?: NO

## 2024-07-30 SDOH — HEALTH STABILITY: PHYSICAL HEALTH: ON AVERAGE, HOW MANY DAYS PER WEEK DO YOU ENGAGE IN MODERATE TO STRENUOUS EXERCISE (LIKE A BRISK WALK)?: 0 DAYS

## 2024-07-30 SDOH — ECONOMIC STABILITY: INCOME INSECURITY: HOW HARD IS IT FOR YOU TO PAY FOR THE VERY BASICS LIKE FOOD, HOUSING, MEDICAL CARE, AND HEATING?: NOT HARD AT ALL

## 2024-07-30 SDOH — ECONOMIC STABILITY: HOUSING INSECURITY: AT ANY TIME IN THE PAST 12 MONTHS, WERE YOU HOMELESS OR LIVING IN A SHELTER (INCLUDING NOW)?: NO

## 2024-07-30 SDOH — ECONOMIC STABILITY: TRANSPORTATION INSECURITY
IN THE PAST 12 MONTHS, HAS THE LACK OF TRANSPORTATION KEPT YOU FROM MEDICAL APPOINTMENTS OR FROM GETTING MEDICATIONS?: NO

## 2024-07-30 SDOH — ECONOMIC STABILITY: INCOME INSECURITY: IN THE LAST 12 MONTHS, WAS THERE A TIME WHEN YOU WERE NOT ABLE TO PAY THE MORTGAGE OR RENT ON TIME?: NO

## 2024-07-30 SDOH — ECONOMIC STABILITY: HOUSING INSECURITY: IN THE PAST 12 MONTHS, HOW MANY TIMES HAVE YOU MOVED WHERE YOU WERE LIVING?: 1

## 2024-07-30 SDOH — SOCIAL STABILITY: SOCIAL INSECURITY: HAVE YOU HAD THOUGHTS OF HARMING ANYONE ELSE?: NO

## 2024-07-30 SDOH — SOCIAL STABILITY: SOCIAL INSECURITY: WERE YOU ABLE TO COMPLETE ALL THE BEHAVIORAL HEALTH SCREENINGS?: YES

## 2024-07-30 SDOH — HEALTH STABILITY: PHYSICAL HEALTH: ON AVERAGE, HOW MANY MINUTES DO YOU ENGAGE IN EXERCISE AT THIS LEVEL?: 0 MIN

## 2024-07-30 ASSESSMENT — PAIN DESCRIPTION - ORIENTATION: ORIENTATION: UPPER

## 2024-07-30 ASSESSMENT — LIFESTYLE VARIABLES
HAVE YOU EVER FELT YOU SHOULD CUT DOWN ON YOUR DRINKING: NO
SKIP TO QUESTIONS 9-10: 1
HOW OFTEN DO YOU HAVE 6 OR MORE DRINKS ON ONE OCCASION: NEVER
HAVE PEOPLE ANNOYED YOU BY CRITICIZING YOUR DRINKING: NO
EVER HAD A DRINK FIRST THING IN THE MORNING TO STEADY YOUR NERVES TO GET RID OF A HANGOVER: NO
AUDIT-C TOTAL SCORE: 0
HOW MANY STANDARD DRINKS CONTAINING ALCOHOL DO YOU HAVE ON A TYPICAL DAY: PATIENT DOES NOT DRINK
HOW OFTEN DO YOU HAVE A DRINK CONTAINING ALCOHOL: NEVER
TOTAL SCORE: 0
AUDIT-C TOTAL SCORE: 0
EVER FELT BAD OR GUILTY ABOUT YOUR DRINKING: NO

## 2024-07-30 ASSESSMENT — ACTIVITIES OF DAILY LIVING (ADL)
FEEDING YOURSELF: INDEPENDENT
PATIENT'S MEMORY ADEQUATE TO SAFELY COMPLETE DAILY ACTIVITIES?: YES
JUDGMENT_ADEQUATE_SAFELY_COMPLETE_DAILY_ACTIVITIES: YES
ASSISTIVE_DEVICE: WALKER
FEEDING YOURSELF: INDEPENDENT
WALKS IN HOME: INDEPENDENT
HEARING - RIGHT EAR: FUNCTIONAL
BATHING: INDEPENDENT
HEARING - LEFT EAR: FUNCTIONAL
GROOMING: INDEPENDENT
WALKS IN HOME: INDEPENDENT
ADEQUATE_TO_COMPLETE_ADL: YES
JUDGMENT_ADEQUATE_SAFELY_COMPLETE_DAILY_ACTIVITIES: YES
GROOMING: INDEPENDENT
DRESSING YOURSELF: INDEPENDENT
HEARING - RIGHT EAR: FUNCTIONAL
DRESSING YOURSELF: INDEPENDENT
TOILETING: INDEPENDENT
PATIENT'S MEMORY ADEQUATE TO SAFELY COMPLETE DAILY ACTIVITIES?: YES
ADEQUATE_TO_COMPLETE_ADL: YES
BATHING: INDEPENDENT
TOILETING: INDEPENDENT

## 2024-07-30 ASSESSMENT — COGNITIVE AND FUNCTIONAL STATUS - GENERAL
DAILY ACTIVITIY SCORE: 24
MOVING TO AND FROM BED TO CHAIR: A LOT
STANDING UP FROM CHAIR USING ARMS: A LOT
CLIMB 3 TO 5 STEPS WITH RAILING: TOTAL
TOILETING: A LITTLE
MOVING FROM LYING ON BACK TO SITTING ON SIDE OF FLAT BED WITH BEDRAILS: A LITTLE
DAILY ACTIVITIY SCORE: 20
WALKING IN HOSPITAL ROOM: A LOT
PATIENT BASELINE BEDBOUND: NO
PATIENT BASELINE BEDBOUND: NO
HELP NEEDED FOR BATHING: A LOT
DRESSING REGULAR LOWER BODY CLOTHING: A LITTLE
MOBILITY SCORE: 12
TURNING FROM BACK TO SIDE WHILE IN FLAT BAD: A LOT

## 2024-07-30 ASSESSMENT — PAIN SCALES - GENERAL
PAINLEVEL_OUTOF10: 10 - WORST POSSIBLE PAIN
PAINLEVEL_OUTOF10: 5 - MODERATE PAIN
PAINLEVEL_OUTOF10: 5 - MODERATE PAIN
PAINLEVEL_OUTOF10: 7

## 2024-07-30 ASSESSMENT — PAIN DESCRIPTION - DESCRIPTORS: DESCRIPTORS: SHARP

## 2024-07-30 ASSESSMENT — PAIN - FUNCTIONAL ASSESSMENT
PAIN_FUNCTIONAL_ASSESSMENT: 0-10

## 2024-07-30 ASSESSMENT — PAIN DESCRIPTION - PROGRESSION: CLINICAL_PROGRESSION: GRADUALLY IMPROVING

## 2024-07-30 ASSESSMENT — PAIN DESCRIPTION - PAIN TYPE: TYPE: ACUTE PAIN

## 2024-07-30 ASSESSMENT — PATIENT HEALTH QUESTIONNAIRE - PHQ9
1. LITTLE INTEREST OR PLEASURE IN DOING THINGS: NOT AT ALL
2. FEELING DOWN, DEPRESSED OR HOPELESS: NOT AT ALL
SUM OF ALL RESPONSES TO PHQ9 QUESTIONS 1 & 2: 0

## 2024-07-30 ASSESSMENT — PAIN DESCRIPTION - LOCATION: LOCATION: LEG

## 2024-07-30 NOTE — ED PROVIDER NOTES
EMERGENCY MEDICINE EVALUATION NOTE    History of Present Illness     Chief Complaint:   Chief Complaint   Patient presents with    femur fracture 1 month ago, bent down and now has 10/10 nguyen       HPI: Joseline Umana is a 89 y.o. female presents with a chief complaint of left leg and hip pain.  She reports that 1 month ago she had a fall in the garage which resulted in a hip fracture that was nonoperative.  She states that she came in today because she was discharged from her nursing facility today to her home.  She states that she bent over to feed her dog and when she bent over she felt a sharp pain in the left hip/leg area.  She reports that she has not really been able to bear weight since then she came in for reevaluation.  Patient denied any fall onto the leg.  She denies any loss neurovascular at his left lower extremity.    Previous History     Past Medical History:   Diagnosis Date    Anemia     Diabetes (Multi)     GERD (gastroesophageal reflux disease)     HLD (hyperlipidemia)     HTN (hypertension)     Hypothyroidism      Past Surgical History:   Procedure Laterality Date    BREAST BIOPSY Bilateral     KNEE Bilateral     total    SHOULDER Bilateral     total     Social History     Tobacco Use    Smoking status: Never    Smokeless tobacco: Never   Vaping Use    Vaping status: Never Used   Substance Use Topics    Alcohol use: Not Currently    Drug use: Never     No family history on file.  Allergies   Allergen Reactions    Procaine Unknown    Sulfa (Sulfonamide Antibiotics) Unknown    Codeine Rash     Current Outpatient Medications   Medication Instructions    aspirin 81 mg, oral, 2 times daily    atorvastatin (LIPITOR) 40 mg, oral, Daily    levothyroxine (Tirosint) 112 mcg capsule oral, Daily, Take on an empty stomach at the same time each day, either 30 to 60 minutes prior to breakfast    losartan (COZAAR) 50 mg, oral, Daily    pantoprazole (PROTONIX) 40 mg, oral, Daily before breakfast, Do not crush,  chew, or split.    phenytoin ER (DILANTIN) 100 mg, oral, 3 times daily    sennosides-docusate sodium (Phylicia-Colace) 8.6-50 mg tablet 1 tablet, oral, 2 times daily PRN       Physical Exam     Appearance: Alert, oriented , cooperative     Skin: Intact,  dry skin, no lesions, rash, petechiae or purpura.      Eyes: PERRLA, EOMs intact,  Conjunctiva pink      ENT: Hearing grossly intact. Pharynx clear     Neck: Supple. Trachea at midline.      Pulmonary: Clear bilaterally. No rales, rhonchi or wheezing. No accessory muscle use or stridor.     Cardiac: Normal rate and rhythm without murmur     Abdomen: Soft, nontender, active bowel sounds.     Musculoskeletal: Decreased range of motion of left hip secondary to pain.  Patient able to minimally raise leg in bed however it does elicit a significant mount of pain.  Diffuse tenderness along the left lateral hip over anterior thigh.  Neuro vas intact in left lower extremity.     Neurological:Cranial nerves II through XII are grossly intact, normal sensation, no weakness, no focal findings identified.     Results     Labs Reviewed   CBC WITH AUTO DIFFERENTIAL - Abnormal       Result Value    WBC 10.5      nRBC 0.0      RBC 3.45 (*)     Hemoglobin 10.6 (*)     Hematocrit 32.1 (*)     MCV 93      MCH 30.7      MCHC 33.0      RDW 13.8      Platelets 268      Neutrophils % 83.7      Immature Granulocytes %, Automated 0.4      Lymphocytes % 7.6      Monocytes % 7.5      Eosinophils % 0.5      Basophils % 0.3      Neutrophils Absolute 8.78 (*)     Immature Granulocytes Absolute, Automated 0.04      Lymphocytes Absolute 0.80      Monocytes Absolute 0.79      Eosinophils Absolute 0.05      Basophils Absolute 0.03     COMPREHENSIVE METABOLIC PANEL - Abnormal    Glucose 115 (*)     Sodium 132 (*)     Potassium 4.7      Chloride 100      Bicarbonate 22      Anion Gap 15      Urea Nitrogen 43 (*)     Creatinine 0.80      eGFR 69      Calcium 9.5      Albumin 4.0      Alkaline Phosphatase 108       Total Protein 7.2      AST 15      Bilirubin, Total 0.5      ALT 9     CBC - Abnormal    WBC 6.2      nRBC 0.0      RBC 3.07 (*)     Hemoglobin 9.3 (*)     Hematocrit 29.2 (*)     MCV 95      MCH 30.3      MCHC 31.8 (*)     RDW 13.8      Platelets 228     BASIC METABOLIC PANEL - Abnormal    Glucose 104 (*)     Sodium 134 (*)     Potassium 4.8      Chloride 105      Bicarbonate 23      Anion Gap 11      Urea Nitrogen 39 (*)     Creatinine 0.71      eGFR 80      Calcium 8.6     BASIC METABOLIC PANEL - Abnormal    Glucose 86      Sodium 133 (*)     Potassium 4.2      Chloride 105      Bicarbonate 21      Anion Gap 11      Urea Nitrogen 31 (*)     Creatinine 0.72      eGFR 79      Calcium 7.8 (*)    CBC - Abnormal    WBC 7.7      nRBC 0.0      RBC 2.59 (*)     Hemoglobin 7.8 (*)     Hematocrit 24.7 (*)     MCV 95      MCH 30.1      MCHC 31.6 (*)     RDW 13.7      Platelets 192     MAGNESIUM - Abnormal    Magnesium 1.58 (*)    BASIC METABOLIC PANEL - Abnormal    Glucose 121 (*)     Sodium 135 (*)     Potassium 4.0      Chloride 105      Bicarbonate 20 (*)     Anion Gap 14      Urea Nitrogen 23      Creatinine 0.60      eGFR 84      Calcium 8.1 (*)    CBC - Abnormal    WBC 6.8      nRBC 0.3 (*)     RBC 2.65 (*)     Hemoglobin 8.0 (*)     Hematocrit 25.1 (*)     MCV 95      MCH 30.2      MCHC 31.9 (*)     RDW 13.6      Platelets 173     MAGNESIUM - Abnormal    Magnesium 1.58 (*)    PROTIME-INR - Normal    Protime 11.7      INR 1.0     APTT - Normal    aPTT 31      Narrative:     The APTT is no longer used for monitoring Unfractionated Heparin Therapy. For monitoring Heparin Therapy, use the Heparin Assay.     FL less than 1 hour   Final Result      XR femur left 2+ views   Final Result   1.  Inter trochanteric fracture of the left hip, as above.   2. Postoperative and degenerative changes, as described above.        MACRO:   None.        Signed by: Tj Brizuela 7/30/2024 3:23 PM   Dictation workstation:   [id number]       XR pelvis 1-2 views   Final Result   1.  Inter trochanteric fracture of the left hip, as above.   2. Postoperative and degenerative changes, as described above.        MACRO:   None.        Signed by: Tj Brizuela 7/30/2024 3:23 PM   Dictation workstation:   [id number]            ED Course & Medical Decision Making     Medications   sodium chloride 0.9% infusion (0 mL/hr intravenous Stopped 8/1/24 1607)   acetaminophen (Tylenol) tablet 975 mg (975 mg oral Given 7/30/24 1454)   ceFAZolin (Ancef) 2 g in dextrose (iso)  mL (2 g intravenous Given 7/31/24 1205)   famotidine PF (Pepcid) injection 20 mg (20 mg intravenous Given 7/31/24 1200)   ceFAZolin (Ancef) 1 g in dextrose (iso) IV 50 mL (0 g intravenous Stopped 8/1/24 0448)         ED Course as of 08/07/24 1047   Tue Jul 30, 2024   1548 Discussed the case with the on-call orthopedic surgeon Dr. Kaufman who at this time reviewed x-rays she states there is a clear intertrochanteric fracture which will require operative management.  At this time patient will be admitted to [facility] with a consult to orthopedic surgery. [CJ]   2901 Updated patient on the plan of care.  Family is at bedside who also agrees.  Patient will be admitted to the hospital at this time. [CJ]   7737 Spoke to Cira the on-call NP for hospitalist who agreed to admit under Dr. Best  [CJ]   8467 This patient was seen by the advanced practice provider.  I have personally performed a substantive portion of the encounter.  I have seen and examined the patient; agree with the workup, evaluation, MDM, management and diagnosis.  The care plan has been discussed.      I personally saw the patient and made/approved the management plan and take responsibility for the patient management.    History: Briefly patient here for severe hip pain  Exam: Left hip tenderness  MDM: I independently interpreted study(s) showing Inter trochanteric fracture of the left hip.  Metabolic panel shows slight hyponatremia  of 132, elevated BUN of 43.  CBC shows no leukocytosis, anemia hemoglobin 10.6.  Plan for admission.   []      ED Course User Index  [CJ] Kaveh Morocho PA-C  [] Mauricio Kim MD         Diagnoses as of 24 1047   Closed displaced intertrochanteric fracture of left femur, initial encounter (Multi)       Procedures   Procedures    Diagnosis     1. Closed displaced intertrochanteric fracture of left femur, initial encounter (Multi)        Disposition   Admit inpatient    ED Prescriptions       Medication Sig Dispense Start Date End Date Auth. Provider    aspirin 81 mg EC tablet Take 1 tablet (81 mg) by mouth 2 times a day. -- 2024 Darryl Best MD    sennosides-docusate sodium (Phylicia-Colace) 8.6-50 mg tablet Take 1 tablet by mouth 2 times a day as needed for constipation for up to 14 days. -- 2024 Darryl Best MD    acetaminophen (Tylenol) 500 mg tablet () Take 2 tablets (1,000 mg) by mouth every 8 hours if needed for mild pain (1 - 3), moderate pain (4 - 6) or headaches for up to 3 days. -- 2024 Darryl Best MD    oxyCODONE (Roxicodone) 5 mg immediate release tablet () Take 1 tablet (5 mg) by mouth 2 times a day as needed for moderate pain (4 - 6) or severe pain (7 - 10) for up to 3 days. 6 tablet 2024 Darryl Best MD            Disclaimer: This note was dictated by speech recognition. Minor errors in transcription may be present. Please call if questions.       Kaveh Morocho PA-C  24 1601       Mauricio Kim MD  24 1048

## 2024-07-30 NOTE — PROGRESS NOTES
Joseline Umana is a 89 y.o. female admitted for No Principal Problem: There is no principal problem currently on the Problem List. Please update the Problem List and refresh.. Pharmacy reviewed the patient's [id number] medications and allergies for accuracy.    The list below reflects the PTA list prior to pharmacy medication history. A summary a changes to the PTA medication list has been listed below. Please review each medication in order reconciliation for additional clarification and justification.    Source of information:  Pharmacy    Medications added:    Medications modified:  Losartan 50mg 2t every day --> 50mg every day     Medications to be removed:    Medications of concern:      Prior to Admission Medications   Prescriptions Last Dose Informant Patient Reported? Taking?   amLODIPine (Norvasc) 10 mg tablet   No No   Sig: Take 1 tablet (10 mg) by mouth once daily.   atorvastatin (Lipitor) 40 mg tablet   Yes No   Sig: Take 1 tablet (40 mg) by mouth once daily.   levothyroxine (Tirosint) 112 mcg capsule   Yes No   Sig: Take by mouth early in the morning.. Take on an empty stomach at the same time each day, either 30 to 60 minutes prior to breakfast   losartan (Cozaar) 50 mg tablet   Yes No   Sig: Take 2 tablets (100 mg) by mouth once daily.   pantoprazole (ProtoNix) 40 mg EC tablet   Yes No   Sig: Take 1 tablet (40 mg) by mouth once daily in the morning. Take before meals. Do not crush, chew, or split.   phenytoin ER (Dilantin) 100 mg capsule   Yes No   Sig: Take 1 capsule (100 mg) by mouth 3 times a day.      Facility-Administered Medications: None       Fely Valencia

## 2024-07-30 NOTE — H&P
"History Of Present Illness  Joseline Umana is a 89 y.o. female who sustained a left greater trochanter fracture due to a fall on 6/14/24. She was admitted, seen by ortho and not felt to be a surgical candidate. She was discharged to SNF. She presented today with left leg and hip pain. She was discharged from the SNF two weeks and was doing \"great\" until she bent over at home to feed her dog today. She felt a sharp pain in the left hip/leg area. She denied any fall onto the leg or any other injury. She has no pain at rest. XR imaging revealed an acute intertrochanteric fracture of the left hip. She was seen by orthopedic surgery (Dr. Kaufman) who plans on operative treatment tomorrow. Remainder of ROS reviewed and negative except as indicated in HPI.     Objective:  Past Medical History  She has a past medical history of Anemia, Diabetes (Multi), GERD (gastroesophageal reflux disease), HLD (hyperlipidemia), HTN (hypertension), and Hypothyroidism.    Surgical History  She has no past surgical history on file.    Social History     Tobacco Use    Smoking status: Never    Smokeless tobacco: Never       Family History  No family history on file.    Allergies  Procaine, Sulfa (sulfonamide antibiotics), and Codeine    Vitals:    07/30/24 1413   BP: 173/77   Pulse: (!) 110   Resp: 16   Temp: 36.4 °C (97.6 °F)   SpO2: 99%       Vitals:    07/30/24 1413   Weight: (!) 43.1 kg (95 lb)       Scheduled medications    Continuous medications    PRN medications      Results for orders placed or performed during the hospital encounter of 07/30/24 (from the past 24 hour(s))   CBC and Auto Differential   Result Value Ref Range    WBC 10.5 4.4 - 11.3 x10*3/uL    nRBC 0.0 0.0 - 0.0 /100 WBCs    RBC 3.45 (L) 4.00 - 5.20 x10*6/uL    Hemoglobin 10.6 (L) 12.0 - 16.0 g/dL    Hematocrit 32.1 (L) 36.0 - 46.0 %    MCV 93 80 - 100 fL    MCH 30.7 26.0 - 34.0 pg    MCHC 33.0 32.0 - 36.0 g/dL    RDW 13.8 11.5 - 14.5 %    Platelets 268 150 - 450 " x10*3/uL    Neutrophils % 83.7 40.0 - 80.0 %    Immature Granulocytes %, Automated 0.4 0.0 - 0.9 %    Lymphocytes % 7.6 13.0 - 44.0 %    Monocytes % 7.5 2.0 - 10.0 %    Eosinophils % 0.5 0.0 - 6.0 %    Basophils % 0.3 0.0 - 2.0 %    Neutrophils Absolute 8.78 (H) 1.60 - 5.50 x10*3/uL    Immature Granulocytes Absolute, Automated 0.04 0.00 - 0.50 x10*3/uL    Lymphocytes Absolute 0.80 0.80 - 3.00 x10*3/uL    Monocytes Absolute 0.79 0.05 - 0.80 x10*3/uL    Eosinophils Absolute 0.05 0.00 - 0.40 x10*3/uL    Basophils Absolute 0.03 0.00 - 0.10 x10*3/uL       I personally reviewed all pertinent labwork, imaging and vital signs, as well as medications, nursing, therapy, discharge planning and consult notes.     Constitutional: Thin, awake, alert, calm, oriented x4, no acute distress, cooperative   Eyes: EOMI, clear sclera   ENMT: mucous membranes moist, no lesions seen   Head/Neck: Neck supple, no apparent injury, head atraumatic   Respiratory/Thorax: CTAB, good chest expansion, respirations even and unlabored   Cardiovascular: Regular rate and rhythm, no murmurs/rubs/gallops, normal S1 and S 2   Gastrointestinal: Abdomen nondistended, soft, nontender, +BS, no bruits   Musculoskeletal: ROM intact, no joint swelling, normal  strength, LLE deferred   Extremities: no cyanosis, edema, contusions or clubbing   Neurological: no focal deficit, pt alert and oriented x4   Psychological: Appropriate affect and behavior, pleasant   Skin: Warm and dry, no lesions, no rashes       Assessment/Plan  1. Acute left intertrochanteric fracture       Hx left greater trochanter fracture due to a fall on 6/14/24  Pt denies cardiac and respiratory symptoms, she has no hx of pulmonary or cardiac disease  EKG shows NSR HR 86 and no evidence of myocardial ischemia, echo 2022 revealed normal LVD w/moderate-severe MR  She has 0 risk factors for this procedure and has <= 0.4% risk of a major cardiac event per the Revised Cardiac Risk Index     2.  Diabetes   Appears to be diet controlled, A1c was 6.8 on 6/14/24, pt is not on diabetic meds at home, BMP is pending     3. Chronic anemia, at baseline      4. HTN    BP is controlled, continue home meds     5. DVT ppx  Held preoperatively, postop ppx per surgery     6. Discharge disposition  Pending postop therapy evaluations      Cira Stephenson, CNP  Hospital Medicine

## 2024-07-30 NOTE — CARE PLAN
Problem: Fall/Injury  Goal: Not fall by end of shift  7/30/2024 1850 by Orestes Verma RN  Outcome: Progressing  7/30/2024 1850 by Orestes Verma RN  Outcome: Progressing  Goal: Be free from injury by end of the shift  7/30/2024 1850 by Orestes Verma RN  Outcome: Progressing  7/30/2024 1850 by Orestes Verma RN  Outcome: Progressing  Goal: Verbalize understanding of personal risk factors for fall in the hospital  7/30/2024 1850 by Orestes Verma RN  Outcome: Progressing  7/30/2024 1850 by Orestes Verma RN  Outcome: Progressing  Goal: Verbalize understanding of risk factor reduction measures to prevent injury from fall in the home  7/30/2024 1850 by Orestes Verma RN  Outcome: Progressing  7/30/2024 1850 by Orestes Verma RN  Outcome: Progressing  Goal: Use assistive devices by end of the shift  7/30/2024 1850 by Orestes Verma RN  Outcome: Progressing  7/30/2024 1850 by Orestes Verma RN  Outcome: Progressing  Goal: Pace activities to prevent fatigue by end of the shift  7/30/2024 1850 by Orestes Verma RN  Outcome: Progressing  7/30/2024 1850 by Orestes Verma RN  Outcome: Progressing     Problem: Skin  Goal: Decreased wound size/increased tissue granulation at next dressing change  7/30/2024 1850 by Orestes Verma RN  Outcome: Progressing  Flowsheets (Taken 7/30/2024 1850)  Decreased wound size/increased tissue granulation at next dressing change:   Promote sleep for wound healing   Protective dressings over bony prominences  7/30/2024 1850 by Orestes Verma RN  Outcome: Progressing  Flowsheets (Taken 7/30/2024 1850)  Decreased wound size/increased tissue granulation at next dressing change:   Promote sleep for wound healing   Protective dressings over bony prominences  Goal: Participates in plan/prevention/treatment measures  7/30/2024 1850 by Orestes Verma RN  Outcome: Progressing  Flowsheets (Taken 7/30/2024 1850)  Participates in  plan/prevention/treatment measures: Increase activity/out of bed for meals  7/30/2024 1850 by Orestes Verma RN  Outcome: Progressing  Flowsheets (Taken 7/30/2024 1850)  Participates in plan/prevention/treatment measures: Increase activity/out of bed for meals  Goal: Prevent/manage excess moisture  7/30/2024 1850 by Orestes Verma RN  Outcome: Progressing  Flowsheets (Taken 7/30/2024 1850)  Prevent/manage excess moisture:   Monitor for/manage infection if present   Cleanse incontinence/protect with barrier cream  7/30/2024 1850 by Orestes Verma RN  Outcome: Progressing  Flowsheets (Taken 7/30/2024 1850)  Prevent/manage excess moisture:   Monitor for/manage infection if present   Cleanse incontinence/protect with barrier cream  Goal: Prevent/minimize sheer/friction injuries  7/30/2024 1850 by Orestes Verma RN  Outcome: Progressing  Flowsheets (Taken 7/30/2024 1850)  Prevent/minimize sheer/friction injuries:   HOB 30 degrees or less   Increase activity/out of bed for meals   Turn/reposition every 2 hours/use positioning/transfer devices  7/30/2024 1850 by Orestes Verma RN  Outcome: Progressing  Flowsheets (Taken 7/30/2024 1850)  Prevent/minimize sheer/friction injuries:   HOB 30 degrees or less   Increase activity/out of bed for meals   Turn/reposition every 2 hours/use positioning/transfer devices  Goal: Promote/optimize nutrition  7/30/2024 1850 by Orestes Verma RN  Outcome: Progressing  Flowsheets (Taken 7/30/2024 1850)  Promote/optimize nutrition:   Consume > 50% meals/supplements   Monitor/record intake including meals  7/30/2024 1850 by Orestes Verma RN  Outcome: Progressing  Flowsheets (Taken 7/30/2024 1850)  Promote/optimize nutrition:   Consume > 50% meals/supplements   Monitor/record intake including meals  Goal: Promote skin healing  7/30/2024 1850 by Orestes Verma RN  Outcome: Progressing  Flowsheets (Taken 7/30/2024 1850)  Promote skin healing:   Assess skin/pad under  line(s)/device(s)   Turn/reposition every 2 hours/use positioning/transfer devices  7/30/2024 1850 by Orestes Verma RN  Outcome: Progressing  Flowsheets (Taken 7/30/2024 1850)  Promote skin healing:   Assess skin/pad under line(s)/device(s)   Turn/reposition every 2 hours/use positioning/transfer devices     Problem: Pain  Goal: Takes deep breaths with improved pain control throughout the shift  7/30/2024 1850 by Orestes Verma RN  Outcome: Progressing  7/30/2024 1850 by Orestes Verma RN  Outcome: Progressing  Goal: Turns in bed with improved pain control throughout the shift  7/30/2024 1850 by Orestes Verma RN  Outcome: Progressing  7/30/2024 1850 by Orestes Verma RN  Outcome: Progressing  Goal: Walks with improved pain control throughout the shift  7/30/2024 1850 by Orestes Verma RN  Outcome: Progressing  7/30/2024 1850 by Orestes Verma RN  Outcome: Progressing  Goal: Performs ADL's with improved pain control throughout shift  7/30/2024 1850 by Orestes Verma RN  Outcome: Progressing  7/30/2024 1850 by Orestes Verma RN  Outcome: Progressing  Goal: Participates in PT with improved pain control throughout the shift  7/30/2024 1850 by Orestes Verma RN  Outcome: Progressing  7/30/2024 1850 by Orestes Verma RN  Outcome: Progressing  Goal: Free from opioid side effects throughout the shift  7/30/2024 1850 by Orestes Verma RN  Outcome: Progressing  7/30/2024 1850 by Orestes Verma RN  Outcome: Progressing  Goal: Free from acute confusion related to pain meds throughout the shift  7/30/2024 1850 by Orestes Verma RN  Outcome: Progressing  7/30/2024 1850 by Orestes Verma RN  Outcome: Progressing   The patient's goals for the shift include      The clinical goals for the shift include no falls    O

## 2024-07-31 ENCOUNTER — ANESTHESIA (OUTPATIENT)
Dept: OPERATING ROOM | Facility: HOSPITAL | Age: 89
End: 2024-07-31
Payer: MEDICARE

## 2024-07-31 ENCOUNTER — APPOINTMENT (OUTPATIENT)
Dept: RADIOLOGY | Facility: HOSPITAL | Age: 89
End: 2024-07-31
Payer: MEDICARE

## 2024-07-31 ENCOUNTER — ANESTHESIA EVENT (OUTPATIENT)
Dept: OPERATING ROOM | Facility: HOSPITAL | Age: 89
End: 2024-07-31
Payer: MEDICARE

## 2024-07-31 LAB
ANION GAP SERPL CALC-SCNC: 11 MMOL/L (ref 10–20)
BUN SERPL-MCNC: 39 MG/DL (ref 6–23)
CALCIUM SERPL-MCNC: 8.6 MG/DL (ref 8.6–10.3)
CHLORIDE SERPL-SCNC: 105 MMOL/L (ref 98–107)
CO2 SERPL-SCNC: 23 MMOL/L (ref 21–32)
CREAT SERPL-MCNC: 0.71 MG/DL (ref 0.5–1.05)
EGFRCR SERPLBLD CKD-EPI 2021: 80 ML/MIN/1.73M*2
ERYTHROCYTE [DISTWIDTH] IN BLOOD BY AUTOMATED COUNT: 13.8 % (ref 11.5–14.5)
GLUCOSE SERPL-MCNC: 104 MG/DL (ref 74–99)
HCT VFR BLD AUTO: 29.2 % (ref 36–46)
HGB BLD-MCNC: 9.3 G/DL (ref 12–16)
MCH RBC QN AUTO: 30.3 PG (ref 26–34)
MCHC RBC AUTO-ENTMCNC: 31.8 G/DL (ref 32–36)
MCV RBC AUTO: 95 FL (ref 80–100)
NRBC BLD-RTO: 0 /100 WBCS (ref 0–0)
PLATELET # BLD AUTO: 228 X10*3/UL (ref 150–450)
POTASSIUM SERPL-SCNC: 4.8 MMOL/L (ref 3.5–5.3)
RBC # BLD AUTO: 3.07 X10*6/UL (ref 4–5.2)
SODIUM SERPL-SCNC: 134 MMOL/L (ref 136–145)
WBC # BLD AUTO: 6.2 X10*3/UL (ref 4.4–11.3)

## 2024-07-31 PROCEDURE — 97110 THERAPEUTIC EXERCISES: CPT | Mod: GP | Performed by: PHYSICAL THERAPIST

## 2024-07-31 PROCEDURE — 7100000001 HC RECOVERY ROOM TIME - INITIAL BASE CHARGE: Performed by: SPECIALIST

## 2024-07-31 PROCEDURE — 3600000009 HC OR TIME - EACH INCREMENTAL 1 MINUTE - PROCEDURE LEVEL FOUR: Performed by: SPECIALIST

## 2024-07-31 PROCEDURE — 27245 TREAT THIGH FRACTURE: CPT | Performed by: SPECIALIST

## 2024-07-31 PROCEDURE — 2500000004 HC RX 250 GENERAL PHARMACY W/ HCPCS (ALT 636 FOR OP/ED): Performed by: NURSE ANESTHETIST, CERTIFIED REGISTERED

## 2024-07-31 PROCEDURE — 76000 FLUOROSCOPY <1 HR PHYS/QHP: CPT

## 2024-07-31 PROCEDURE — 36415 COLL VENOUS BLD VENIPUNCTURE: CPT | Performed by: NURSE PRACTITIONER

## 2024-07-31 PROCEDURE — 2500000001 HC RX 250 WO HCPCS SELF ADMINISTERED DRUGS (ALT 637 FOR MEDICARE OP): Performed by: SPECIALIST

## 2024-07-31 PROCEDURE — 85027 COMPLETE CBC AUTOMATED: CPT | Performed by: NURSE PRACTITIONER

## 2024-07-31 PROCEDURE — 3600000004 HC OR TIME - INITIAL BASE CHARGE - PROCEDURE LEVEL FOUR: Performed by: SPECIALIST

## 2024-07-31 PROCEDURE — 7100000002 HC RECOVERY ROOM TIME - EACH INCREMENTAL 1 MINUTE: Performed by: SPECIALIST

## 2024-07-31 PROCEDURE — C1769 GUIDE WIRE: HCPCS | Performed by: SPECIALIST

## 2024-07-31 PROCEDURE — 3700000001 HC GENERAL ANESTHESIA TIME - INITIAL BASE CHARGE: Performed by: SPECIALIST

## 2024-07-31 PROCEDURE — 2500000004 HC RX 250 GENERAL PHARMACY W/ HCPCS (ALT 636 FOR OP/ED): Performed by: SPECIALIST

## 2024-07-31 PROCEDURE — 2500000004 HC RX 250 GENERAL PHARMACY W/ HCPCS (ALT 636 FOR OP/ED): Performed by: NURSE PRACTITIONER

## 2024-07-31 PROCEDURE — 99233 SBSQ HOSP IP/OBS HIGH 50: CPT | Performed by: PHYSICIAN ASSISTANT

## 2024-07-31 PROCEDURE — 2500000005 HC RX 250 GENERAL PHARMACY W/O HCPCS: Performed by: SPECIALIST

## 2024-07-31 PROCEDURE — 82374 ASSAY BLOOD CARBON DIOXIDE: CPT | Performed by: NURSE PRACTITIONER

## 2024-07-31 PROCEDURE — 97161 PT EVAL LOW COMPLEX 20 MIN: CPT | Mod: GP | Performed by: PHYSICAL THERAPIST

## 2024-07-31 PROCEDURE — 2780000003 HC OR 278 NO HCPCS: Performed by: SPECIALIST

## 2024-07-31 PROCEDURE — 2500000005 HC RX 250 GENERAL PHARMACY W/O HCPCS: Performed by: NURSE ANESTHETIST, CERTIFIED REGISTERED

## 2024-07-31 PROCEDURE — 3700000002 HC GENERAL ANESTHESIA TIME - EACH INCREMENTAL 1 MINUTE: Performed by: SPECIALIST

## 2024-07-31 PROCEDURE — 99222 1ST HOSP IP/OBS MODERATE 55: CPT | Performed by: SPECIALIST

## 2024-07-31 PROCEDURE — C1713 ANCHOR/SCREW BN/BN,TIS/BN: HCPCS | Performed by: SPECIALIST

## 2024-07-31 PROCEDURE — 0QS704Z REPOSITION LEFT UPPER FEMUR WITH INTERNAL FIXATION DEVICE, OPEN APPROACH: ICD-10-PCS | Performed by: SPECIALIST

## 2024-07-31 PROCEDURE — 2500000004 HC RX 250 GENERAL PHARMACY W/ HCPCS (ALT 636 FOR OP/ED): Mod: JZ | Performed by: SPECIALIST

## 2024-07-31 PROCEDURE — 2720000007 HC OR 272 NO HCPCS: Performed by: SPECIALIST

## 2024-07-31 PROCEDURE — 2500000004 HC RX 250 GENERAL PHARMACY W/ HCPCS (ALT 636 FOR OP/ED): Performed by: ANESTHESIOLOGY

## 2024-07-31 PROCEDURE — 1210000001 HC SEMI-PRIVATE ROOM DAILY

## 2024-07-31 DEVICE — IMPLANTABLE DEVICE: Type: IMPLANTABLE DEVICE | Site: HIP | Status: FUNCTIONAL

## 2024-07-31 DEVICE — 11MM/130 DEG TI CANN TFNA 170MM - STERILE
Type: IMPLANTABLE DEVICE | Site: HIP | Status: FUNCTIONAL
Brand: TFN-ADVANCE

## 2024-07-31 DEVICE — TFNA FENESTRATED HELICAL BLADE 85MM - STERILE
Type: IMPLANTABLE DEVICE | Site: HIP | Status: FUNCTIONAL
Brand: TFN-ADVANCE

## 2024-07-31 RX ORDER — HYDRALAZINE HYDROCHLORIDE 20 MG/ML
5 INJECTION INTRAMUSCULAR; INTRAVENOUS EVERY 30 MIN PRN
Status: DISCONTINUED | OUTPATIENT
Start: 2024-07-31 | End: 2024-07-31 | Stop reason: HOSPADM

## 2024-07-31 RX ORDER — DROPERIDOL 2.5 MG/ML
0.62 INJECTION, SOLUTION INTRAMUSCULAR; INTRAVENOUS ONCE AS NEEDED
Status: DISCONTINUED | OUTPATIENT
Start: 2024-07-31 | End: 2024-07-31 | Stop reason: HOSPADM

## 2024-07-31 RX ORDER — SODIUM CHLORIDE 9 MG/ML
75 INJECTION, SOLUTION INTRAVENOUS CONTINUOUS
Status: ACTIVE | OUTPATIENT
Start: 2024-07-31 | End: 2024-08-01

## 2024-07-31 RX ORDER — CEFAZOLIN SODIUM 1 G/50ML
1 SOLUTION INTRAVENOUS EVERY 8 HOURS
Status: COMPLETED | OUTPATIENT
Start: 2024-07-31 | End: 2024-08-01

## 2024-07-31 RX ORDER — BUPIVACAINE HCL/EPINEPHRINE 0.5-1:200K
VIAL (ML) INJECTION AS NEEDED
Status: DISCONTINUED | OUTPATIENT
Start: 2024-07-31 | End: 2024-07-31 | Stop reason: HOSPADM

## 2024-07-31 RX ORDER — LIDOCAINE HCL/PF 100 MG/5ML
SYRINGE (ML) INTRAVENOUS AS NEEDED
Status: DISCONTINUED | OUTPATIENT
Start: 2024-07-31 | End: 2024-07-31

## 2024-07-31 RX ORDER — SODIUM CHLORIDE, SODIUM LACTATE, POTASSIUM CHLORIDE, CALCIUM CHLORIDE 600; 310; 30; 20 MG/100ML; MG/100ML; MG/100ML; MG/100ML
100 INJECTION, SOLUTION INTRAVENOUS CONTINUOUS
Status: DISCONTINUED | OUTPATIENT
Start: 2024-07-31 | End: 2024-07-31 | Stop reason: HOSPADM

## 2024-07-31 RX ORDER — HYDROMORPHONE HYDROCHLORIDE 1 MG/ML
INJECTION, SOLUTION INTRAMUSCULAR; INTRAVENOUS; SUBCUTANEOUS AS NEEDED
Status: DISCONTINUED | OUTPATIENT
Start: 2024-07-31 | End: 2024-07-31

## 2024-07-31 RX ORDER — ONDANSETRON HYDROCHLORIDE 2 MG/ML
4 INJECTION, SOLUTION INTRAVENOUS ONCE AS NEEDED
Status: DISCONTINUED | OUTPATIENT
Start: 2024-07-31 | End: 2024-07-31 | Stop reason: HOSPADM

## 2024-07-31 RX ORDER — LIDOCAINE HYDROCHLORIDE 10 MG/ML
0.1 INJECTION, SOLUTION EPIDURAL; INFILTRATION; INTRACAUDAL; PERINEURAL ONCE
Status: DISCONTINUED | OUTPATIENT
Start: 2024-07-31 | End: 2024-07-31 | Stop reason: HOSPADM

## 2024-07-31 RX ORDER — DIPHENHYDRAMINE HYDROCHLORIDE 50 MG/ML
12.5 INJECTION INTRAMUSCULAR; INTRAVENOUS ONCE AS NEEDED
Status: DISCONTINUED | OUTPATIENT
Start: 2024-07-31 | End: 2024-07-31 | Stop reason: HOSPADM

## 2024-07-31 RX ORDER — LIDOCAINE HYDROCHLORIDE 20 MG/ML
INJECTION, SOLUTION EPIDURAL; INFILTRATION; INTRACAUDAL; PERINEURAL AS NEEDED
Status: DISCONTINUED | OUTPATIENT
Start: 2024-07-31 | End: 2024-07-31

## 2024-07-31 RX ORDER — FAMOTIDINE 10 MG/ML
20 INJECTION INTRAVENOUS ONCE
Status: COMPLETED | OUTPATIENT
Start: 2024-07-31 | End: 2024-07-31

## 2024-07-31 RX ORDER — ONDANSETRON HYDROCHLORIDE 2 MG/ML
INJECTION, SOLUTION INTRAVENOUS AS NEEDED
Status: DISCONTINUED | OUTPATIENT
Start: 2024-07-31 | End: 2024-07-31

## 2024-07-31 RX ORDER — LABETALOL HYDROCHLORIDE 5 MG/ML
5 INJECTION, SOLUTION INTRAVENOUS ONCE AS NEEDED
Status: DISCONTINUED | OUTPATIENT
Start: 2024-07-31 | End: 2024-07-31 | Stop reason: HOSPADM

## 2024-07-31 RX ORDER — PROPOFOL 10 MG/ML
INJECTION, EMULSION INTRAVENOUS AS NEEDED
Status: DISCONTINUED | OUTPATIENT
Start: 2024-07-31 | End: 2024-07-31

## 2024-07-31 RX ORDER — FAMOTIDINE 10 MG/ML
20 INJECTION INTRAVENOUS ONCE
Status: DISCONTINUED | OUTPATIENT
Start: 2024-07-31 | End: 2024-07-31 | Stop reason: HOSPADM

## 2024-07-31 RX ORDER — TRANEXAMIC ACID 100 MG/ML
INJECTION, SOLUTION INTRAVENOUS AS NEEDED
Status: DISCONTINUED | OUTPATIENT
Start: 2024-07-31 | End: 2024-07-31

## 2024-07-31 RX ORDER — SODIUM CHLORIDE, SODIUM LACTATE, POTASSIUM CHLORIDE, CALCIUM CHLORIDE 600; 310; 30; 20 MG/100ML; MG/100ML; MG/100ML; MG/100ML
100 INJECTION, SOLUTION INTRAVENOUS CONTINUOUS
Status: DISCONTINUED | OUTPATIENT
Start: 2024-07-31 | End: 2024-07-31

## 2024-07-31 RX ORDER — ALBUTEROL SULFATE 0.83 MG/ML
2.5 SOLUTION RESPIRATORY (INHALATION) ONCE AS NEEDED
Status: DISCONTINUED | OUTPATIENT
Start: 2024-07-31 | End: 2024-07-31 | Stop reason: HOSPADM

## 2024-07-31 RX ORDER — MORPHINE SULFATE 2 MG/ML
2 INJECTION, SOLUTION INTRAMUSCULAR; INTRAVENOUS EVERY 5 MIN PRN
Status: DISCONTINUED | OUTPATIENT
Start: 2024-07-31 | End: 2024-07-31 | Stop reason: HOSPADM

## 2024-07-31 RX ORDER — ASPIRIN 81 MG/1
81 TABLET ORAL 2 TIMES DAILY
Status: DISCONTINUED | OUTPATIENT
Start: 2024-07-31 | End: 2024-08-02 | Stop reason: HOSPADM

## 2024-07-31 RX ORDER — CEFAZOLIN SODIUM 2 G/100ML
2 INJECTION, SOLUTION INTRAVENOUS ONCE
Status: COMPLETED | OUTPATIENT
Start: 2024-07-31 | End: 2024-07-31

## 2024-07-31 RX ORDER — FENTANYL CITRATE 50 UG/ML
INJECTION, SOLUTION INTRAMUSCULAR; INTRAVENOUS AS NEEDED
Status: DISCONTINUED | OUTPATIENT
Start: 2024-07-31 | End: 2024-07-31

## 2024-07-31 RX ORDER — ROPIVACAINE HYDROCHLORIDE 5 MG/ML
INJECTION, SOLUTION EPIDURAL; INFILTRATION; PERINEURAL AS NEEDED
Status: DISCONTINUED | OUTPATIENT
Start: 2024-07-31 | End: 2024-07-31

## 2024-07-31 RX ORDER — CEFAZOLIN SODIUM 2 G/100ML
2 INJECTION, SOLUTION INTRAVENOUS ONCE
Status: DISCONTINUED | OUTPATIENT
Start: 2024-07-31 | End: 2024-07-31 | Stop reason: HOSPADM

## 2024-07-31 SDOH — HEALTH STABILITY: MENTAL HEALTH: CURRENT SMOKER: 0

## 2024-07-31 ASSESSMENT — COGNITIVE AND FUNCTIONAL STATUS - GENERAL
WALKING IN HOSPITAL ROOM: TOTAL
WALKING IN HOSPITAL ROOM: A LOT
MOBILITY SCORE: 12
DAILY ACTIVITIY SCORE: 20
CLIMB 3 TO 5 STEPS WITH RAILING: TOTAL
HELP NEEDED FOR BATHING: A LITTLE
MOVING TO AND FROM BED TO CHAIR: A LOT
MOBILITY SCORE: 12
STANDING UP FROM CHAIR USING ARMS: A LOT
MOVING TO AND FROM BED TO CHAIR: A LOT
MOVING FROM LYING ON BACK TO SITTING ON SIDE OF FLAT BED WITH BEDRAILS: A LITTLE
MOVING FROM LYING ON BACK TO SITTING ON SIDE OF FLAT BED WITH BEDRAILS: A LITTLE
TURNING FROM BACK TO SIDE WHILE IN FLAT BAD: A LITTLE
STANDING UP FROM CHAIR USING ARMS: A LOT
TOILETING: A LITTLE
TURNING FROM BACK TO SIDE WHILE IN FLAT BAD: A LOT
DRESSING REGULAR LOWER BODY CLOTHING: A LOT
CLIMB 3 TO 5 STEPS WITH RAILING: TOTAL

## 2024-07-31 ASSESSMENT — PAIN - FUNCTIONAL ASSESSMENT
PAIN_FUNCTIONAL_ASSESSMENT: 0-10

## 2024-07-31 ASSESSMENT — ENCOUNTER SYMPTOMS
WHEEZING: 0
EYE PAIN: 0
SORE THROAT: 0
WOUND: 0
CHEST TIGHTNESS: 0
FLANK PAIN: 0
FREQUENCY: 0
CHILLS: 0
FACIAL SWELLING: 0
JOINT SWELLING: 0
SHORTNESS OF BREATH: 0
NAUSEA: 0
DYSURIA: 0
DIAPHORESIS: 0
HEADACHES: 0
CONSTIPATION: 0
HEMATURIA: 0
PALPITATIONS: 0
WEAKNESS: 0
ABDOMINAL PAIN: 0
BLOOD IN STOOL: 0
VOMITING: 0
APPETITE CHANGE: 0
DIARRHEA: 0
TROUBLE SWALLOWING: 0
DIZZINESS: 0
HALLUCINATIONS: 0
LIGHT-HEADEDNESS: 0
BRUISES/BLEEDS EASILY: 0
BACK PAIN: 0
FEVER: 0
COUGH: 0
FATIGUE: 0
NUMBNESS: 0

## 2024-07-31 ASSESSMENT — PAIN SCALES - GENERAL
PAINLEVEL_OUTOF10: 0 - NO PAIN
PAINLEVEL_OUTOF10: 0 - NO PAIN
PAINLEVEL_OUTOF10: 5 - MODERATE PAIN
PAINLEVEL_OUTOF10: 0 - NO PAIN
PAINLEVEL_OUTOF10: 7
PAINLEVEL_OUTOF10: 8
PAINLEVEL_OUTOF10: 2
PAINLEVEL_OUTOF10: 0 - NO PAIN
PAINLEVEL_OUTOF10: 0 - NO PAIN
PAIN_LEVEL: 0
PAINLEVEL_OUTOF10: 0 - NO PAIN

## 2024-07-31 ASSESSMENT — COLUMBIA-SUICIDE SEVERITY RATING SCALE - C-SSRS
6. HAVE YOU EVER DONE ANYTHING, STARTED TO DO ANYTHING, OR PREPARED TO DO ANYTHING TO END YOUR LIFE?: NO
1. IN THE PAST MONTH, HAVE YOU WISHED YOU WERE DEAD OR WISHED YOU COULD GO TO SLEEP AND NOT WAKE UP?: NO
2. HAVE YOU ACTUALLY HAD ANY THOUGHTS OF KILLING YOURSELF?: NO

## 2024-07-31 NOTE — CONSULTS
"Reason For Consult  Left hip fracture    History Of Present Illness  Joseline Umana is a 89 y.o. female presenting with left hip pain as described per the ED.  There workup with plain radiographs identified a intertrochanteric femur fracture.  She was appropriately treated for a greater trochanteric fracture same hip around 6 weeks ago.  She then had an episode where bending over stressed the hip and progress to fracture from the greater troches to the lesser troches thus causing an unstable hip fracture.  She states she has no other regions of pain and states she is otherwise healthy.  She was seen in her hospital bed alert and oriented..     Past Medical History  She has a past medical history of Anemia, Diabetes (Multi), GERD (gastroesophageal reflux disease), HLD (hyperlipidemia), HTN (hypertension), and Hypothyroidism.    Surgical History  She has no past surgical history on file.     Social History  She reports that she has never smoked. She has never used smokeless tobacco. No history on file for alcohol use and drug use.    Family History  No family history on file.     Allergies  Procaine, Sulfa (sulfonamide antibiotics), and Codeine    Review of Systems  Unremarkable     Physical Exam  Alert and oriented x 3 no acute distress.  Left lower extremity is in neutral position slight external rotation.  Any palpation or motion of the left hip reproduces severe pain.  Her dermis is intact no signs of compartment syndrome.  Distal neurovascular intact     Last Recorded Vitals  Blood pressure 115/67, pulse 79, temperature 35.9 °C (96.6 °F), temperature source Temporal, resp. rate 12, height 1.58 m (5' 2.21\"), weight (!) 42.6 kg (94 lb), SpO2 99%.    Relevant Results      Scheduled medications  amLODIPine, 10 mg, oral, Daily  atorvastatin, 40 mg, oral, Nightly  levothyroxine, 112 mcg, oral, Daily  losartan, 50 mg, oral, Daily  pantoprazole, 40 mg, oral, Daily before breakfast  phenytoin ER, 100 mg, oral, " TID  sennosides, 2 tablet, oral, BID      Continuous medications     PRN medications  PRN medications: acetaminophen, morphine, traMADol  Results for orders placed or performed during the hospital encounter of 07/30/24 (from the past 24 hour(s))   CBC and Auto Differential   Result Value Ref Range    WBC 10.5 4.4 - 11.3 x10*3/uL    nRBC 0.0 0.0 - 0.0 /100 WBCs    RBC 3.45 (L) 4.00 - 5.20 x10*6/uL    Hemoglobin 10.6 (L) 12.0 - 16.0 g/dL    Hematocrit 32.1 (L) 36.0 - 46.0 %    MCV 93 80 - 100 fL    MCH 30.7 26.0 - 34.0 pg    MCHC 33.0 32.0 - 36.0 g/dL    RDW 13.8 11.5 - 14.5 %    Platelets 268 150 - 450 x10*3/uL    Neutrophils % 83.7 40.0 - 80.0 %    Immature Granulocytes %, Automated 0.4 0.0 - 0.9 %    Lymphocytes % 7.6 13.0 - 44.0 %    Monocytes % 7.5 2.0 - 10.0 %    Eosinophils % 0.5 0.0 - 6.0 %    Basophils % 0.3 0.0 - 2.0 %    Neutrophils Absolute 8.78 (H) 1.60 - 5.50 x10*3/uL    Immature Granulocytes Absolute, Automated 0.04 0.00 - 0.50 x10*3/uL    Lymphocytes Absolute 0.80 0.80 - 3.00 x10*3/uL    Monocytes Absolute 0.79 0.05 - 0.80 x10*3/uL    Eosinophils Absolute 0.05 0.00 - 0.40 x10*3/uL    Basophils Absolute 0.03 0.00 - 0.10 x10*3/uL   Comprehensive metabolic panel   Result Value Ref Range    Glucose 115 (H) 74 - 99 mg/dL    Sodium 132 (L) 136 - 145 mmol/L    Potassium 4.7 3.5 - 5.3 mmol/L    Chloride 100 98 - 107 mmol/L    Bicarbonate 22 21 - 32 mmol/L    Anion Gap 15 10 - 20 mmol/L    Urea Nitrogen 43 (H) 6 - 23 mg/dL    Creatinine 0.80 0.50 - 1.05 mg/dL    eGFR 69 >60 mL/min/1.73m*2    Calcium 9.5 8.6 - 10.3 mg/dL    Albumin 4.0 3.4 - 5.0 g/dL    Alkaline Phosphatase 108 33 - 136 U/L    Total Protein 7.2 6.4 - 8.2 g/dL    AST 15 9 - 39 U/L    Bilirubin, Total 0.5 0.0 - 1.2 mg/dL    ALT 9 7 - 45 U/L   Protime-INR   Result Value Ref Range    Protime 11.7 9.8 - 12.8 seconds    INR 1.0 0.9 - 1.1   aPTT   Result Value Ref Range    aPTT 31 27 - 38 seconds   CBC   Result Value Ref Range    WBC 6.2 4.4 - 11.3  x10*3/uL    nRBC 0.0 0.0 - 0.0 /100 WBCs    RBC 3.07 (L) 4.00 - 5.20 x10*6/uL    Hemoglobin 9.3 (L) 12.0 - 16.0 g/dL    Hematocrit 29.2 (L) 36.0 - 46.0 %    MCV 95 80 - 100 fL    MCH 30.3 26.0 - 34.0 pg    MCHC 31.8 (L) 32.0 - 36.0 g/dL    RDW 13.8 11.5 - 14.5 %    Platelets 228 150 - 450 x10*3/uL   Basic metabolic panel   Result Value Ref Range    Glucose 104 (H) 74 - 99 mg/dL    Sodium 134 (L) 136 - 145 mmol/L    Potassium 4.8 3.5 - 5.3 mmol/L    Chloride 105 98 - 107 mmol/L    Bicarbonate 23 21 - 32 mmol/L    Anion Gap 11 10 - 20 mmol/L    Urea Nitrogen 39 (H) 6 - 23 mg/dL    Creatinine 0.71 0.50 - 1.05 mg/dL    eGFR 80 >60 mL/min/1.73m*2    Calcium 8.6 8.6 - 10.3 mg/dL        Assessment/Plan     Left intertrochanteric hip fracture.  I had a long discussion with the patient regarding her condition and treatment options.  She agrees to proceed with open reduction internal fixation left hip fracture.  This will be performed around noon today.  She is medically cleared and a preoperative informed consent was obtained.    Sergei Kaufman MD

## 2024-07-31 NOTE — ANESTHESIA PROCEDURE NOTES
Airway  Date/Time: 7/31/2024 12:17 PM  Urgency: elective      Staffing  Performed: SRNA   Authorized by: CHACE Glasgow    Performed by: CHACE Glasgow  Patient location during procedure: OR    Indications and Patient Condition  Indications for airway management: anesthesia and airway protection      Final Airway Details  Final airway type: supraglottic airway      Successful airway: classic  Size 3

## 2024-07-31 NOTE — PROGRESS NOTES
Social work consult placed for positive medical risk screen. SW reviewed pt's chart and communicated with TCC. No SW needs foreseen at this time. SW signing off; available upon request.    Dominic Salazar, MSW, LSW ([id number])   Care Transitions

## 2024-07-31 NOTE — ANESTHESIA PREPROCEDURE EVALUATION
Patient: Joseline Umana    Procedure Information       Date/Time: 07/31/24 1200    Procedure: left Hip Fracture ORIF TFN SHORT NAIL SYNTHES *C-ARM (Left: Hip) - synthes TFN short nail    Location: [facility] / [facility]    Surgeons: Sergei Kaufman MD            Relevant Problems   Anesthesia (within normal limits)      Cardiac   (+) Hypertension      /Renal   (+) UTI (urinary tract infection)      Endocrine   (+) Type 2 diabetes mellitus, without long-term current use of insulin (Multi)      Hematology   (+) Anemia      ID   (+) UTI (urinary tract infection)       Clinical information reviewed:   Tobacco  Allergies  Meds   Med Hx  Surg Hx  OB Status  Fam Hx  Soc   Hx        NPO Detail:  NPO/Void Status  Date of Last Liquid: 07/31/24  Time of Last Liquid: 0000  Date of Last Solid: 07/31/24  Time of Last Solid: 0000         Physical Exam    Airway  Mallampati: II  TM distance: >3 FB  Neck ROM: limited     Cardiovascular - normal exam     Dental - normal exam     Pulmonary - normal exam     Abdominal - normal exam         Anesthesia Plan    History of general anesthesia?: yes  History of complications of general anesthesia?: no    ASA 3     general and regional   (Left PENG Nerve Block)  The patient is not a current smoker.    intravenous induction   Postoperative administration of opioids is intended.  Anesthetic plan and risks discussed with patient.  Use of blood products discussed with patient who.    Plan discussed with CRNA.

## 2024-07-31 NOTE — PROGRESS NOTES
Occupational Therapy                 Therapy Communication Note    Patient Name: Joseline Umana  MRN: [id number]  Today's Date: 7/31/2024     Discipline: Occupational Therapy    Missed Visit Reason: Missed Visit Reason: Patient placed on medical hold (OT jenae attempted. pt admit for hip fracture. pending surgical intervention today. hold and await post op orders for activity.)    Missed Time: Attempt    Comment:

## 2024-07-31 NOTE — PROGRESS NOTES
Physical Therapy                 Therapy Communication Note    Patient Name: Joseline Umana  MRN: [id number]  Today's Date: 7/31/2024     Discipline: Physical Therapy    Missed Visit Reason: Missed Visit Reason: Patient placed on medical hold (Patient admitted with hip fx, awaiting surgical repair later today. Reattempt tomorrow.)    Missed Time: Attempt    Comment:

## 2024-07-31 NOTE — CARE PLAN
Problem: Fall/Injury  Goal: Not fall by end of shift  Outcome: Progressing  Goal: Be free from injury by end of the shift  Outcome: Progressing  Goal: Verbalize understanding of personal risk factors for fall in the hospital  Outcome: Progressing  Goal: Verbalize understanding of risk factor reduction measures to prevent injury from fall in the home  Outcome: Progressing  Goal: Use assistive devices by end of the shift  Outcome: Progressing  Goal: Pace activities to prevent fatigue by end of the shift  Outcome: Progressing   The patient's goals for the shift include  Pt will remain free of injury    The clinical goals for the shift include no falls

## 2024-07-31 NOTE — OP NOTE
left Hip Fracture ORIF TFN SHORT NAIL SYNTHES *C-ARM (L) Operative Note     Date: 2024 - 2024  OR Location: [facility]    Name: Joseline Umana, : 1932, Age: 89 y.o., MRN: [id number], Sex: female    Diagnosis  Pre-op Diagnosis      * Closed displaced intertrochanteric fracture of left femur, initial encounter (Multi) [S72.142A] Post-op Diagnosis     * Closed displaced intertrochanteric fracture of left femur, initial encounter (Multi) [S72.142A]     Procedures  left Hip Fracture ORIF TFN SHORT NAIL SYNTHES *C-ARM  32444 - PA TX INTER/PA/SUBTRCHNTRIC FEM FX IMED IMPLTSCREW      Surgeons      * Sergei Kaufman - Primary    Resident/Fellow/Other Assistant:  Surgeons and Role:  * No surgeons found with a matching role *    Procedure Summary  Anesthesia: Regional, General  ASA: III  Anesthesia Staff: CRNA: BITA Glasgow-CRNA  SRNA: Scotty Cuevas RN  Estimated Blood Loss: 30 mL  Intra-op Medications:   Administrations occurring from 1200 to 1410 on 24:   Medication Name Total Dose   BUPivacaine-EPINEPHrine (Marcaine w/EPI) 0.5 %-1:200,000 injection 20 mL   ceFAZolin (Ancef) 2 g in dextrose (iso)  mL 2 g              Anesthesia Record               Intraprocedure I/O Totals       None           Specimen: No specimens collected     Staff:   Circulator: Antonietta  Scrub Person: Anneliese         Drains and/or Catheters: * None in log *    Tourniquet Times:         Implants:  Implants       Type Name Action Serial No.      Joint Hip NAIL, TFN ADV SHORT, 170MML, DIAMETER 11, 130 DEG - [id number] Implanted      Implant HELICAL BLADES, TFNA FENESTRATED, 85MM, STERILE - [id number] Implanted      Screw SCREW, LOCKING 5 X 36MM TI - [id number] Implanted               Findings: Stable fixation of anatomically reduced fracture    Indications: Joseline Umana is an 89 y.o. female who is having surgery for Closed displaced intertrochanteric fracture of left femur, initial encounter (Multi) [S72.142A].  A  "preoperative informed consent was obtained    The patient was seen in the preoperative area. The risks, benefits, complications, treatment options, non-operative alternatives, expected recovery and outcomes were discussed with the patient. The possibilities of reaction to medication, pulmonary aspiration, injury to surrounding structures, bleeding, recurrent infection, the need for additional procedures, failure to diagnose a condition, and creating a complication requiring transfusion or operation were discussed with the patient. The patient concurred with the proposed plan, giving informed consent.  The site of surgery was properly noted/marked if necessary per policy. The patient has been actively warmed in preoperative area. Preoperative antibiotics have been ordered and given within 1 hours of incision. Venous thrombosis prophylaxis have been ordered including unilateral sequential compression device    Procedure Details: Procedure Details: Patient was brought to the OR and placed supine on the OR Ogema table after induction on her [facility] with a general endotracheal anesthetic.  Prior to surgery the patient received appropriate IV antibiotics.  When placed on the Ogema table using C arm images we placed traction flexion and gentle internal rotation to the operative fractured hip, until we obtain anatomic reduction.  Care was taken to use the Ogema table traction in an appropriate manner to avoid any trauma to the extremities.  We then did a sterile prep and drape of the operative side and used the sterile \"shower curtain\" as a drape for the procedure.  We then identified the region of the greater trochanter and approximately 3 cm in line with the shaft of the femur proximal to the greater trochanter we made a 4 cm incision with a 10 blade.  After sharp dissection through the dermis blunt dissection brought us down to the gluteal/fascia venkat and this was incised.  We then used a periosteal elevator to " divide gluteal tissue to expose the tip of the greater trochanter which we could palpate.  We then placed the guidepin at the tip of the greater trochanter and using C arm guidance advanced it to the center center proximal shaft of the femur/metaphyseal region.  When noting appropriate placement of the initial guidepin we used the [employer] proximal femur cannulated drill over the guidewire to open up the proximal femur.  We then used the appropriate sized short TFN nail and tapped it into place in the proximal femur securing the fracture reduction.  Under C-arm guidance we tapped the nail into the appropriate level.  The nail was placed in the position with its out regular and through the outrigger we placed the triple trocar for the pin for the femoral head.  We made a second incision for the trocar and placed it down to bone and then once again under C-arm technique placed the guidepin into the center center of the femoral head on AP and lateral image.  We measured for the size of the helical screw over the guidepin and then used the step drill to drill to the appropriate level of the femoral head.  We then picked the appropriate sized helical screw and tapped it into place through the proximal hole of the nail and into the center center femoral head within 5 mm of the subchondral bone.  We secured the helical screw to the sandra with the proximal set screw.  We backed off a quarter turn and then used a compression technique to confirm stability of the fracture.  We took out the triple trocar and placed second distal interlock trocar into the out regular and through this trocar we drilled the distal interlocking screw hole and measured for the appropriate size distal interlock screw.  That screw was placed.  We then did final AP and lateral C-arm images for permanent.  Excellent reduction and stable fixation was noted.  We copiously irrigated the incisions with saline.  Closed the fascia with 0 Vicryl subdermis with  2-0 Vicryl and the skin with surgical staples.  Sterile dressings were applied and then the patient was awoken in stable condition transferred to recovery.   Complications:  None; patient tolerated the procedure well.    Disposition: PACU - hemodynamically stable.  Condition: stable         Additional Details: 11 mm TFN short nail, 85 mm helical screw, 36 mm distal interlock    Attending Attestation: I was present for the entire procedure.    Sergei Kaufman  Phone Number: [phone]

## 2024-07-31 NOTE — PROGRESS NOTES
07/31/24 1019   Discharge Planning   Living Arrangements Alone   Support Systems Children   Assistance Needed PT OT Post Op   Type of Residence Private residence   Home or Post Acute Services Post acute facilities (Rehab/SNF/etc)   Type of Post Acute Facility Services Skilled nursing   Expected Discharge Disposition SNF   Does the patient need discharge transport arranged? Yes   RoundTrip coordination needed? Yes   Patient Choice   Provider Choice list and CMS website (https://medicare.gov/care-compare#search) for post-acute Quality and Resource Measure Data were provided and reviewed with: Patient     Met with patient at bedside, introduced self and role as RN TCC. Patient lives at home alone with her dog. She states she is normally independent in her own care, cooks, cleans, manages her own medications, etc. She had a recent fall a few weeks ago, went to [place] for 3 weeks and was discharged home. She was bending over to  her dogs bowl when she felt her hip go out. Ortho surgery on consult to repair hip with ORIF. PT OT will evaluate post op. TCC discussed SNF placement after surgery. She prefers to go home if at all possible. TCC explained it really depends on how well she does post op. She would be open to going back to [place] if needed. Will have CNC send referral to confirm how many days patient used. Will continue to follow.

## 2024-07-31 NOTE — ANESTHESIA PROCEDURE NOTES
Peripheral Block    Start time: 7/31/2024 11:43 AM  End time: 7/31/2024 11:46 AM  Reason for block: at surgeon's request and post-op pain management  Staffing  Performed: CRNA   Authorized by: CHACE Yeung    Performed by: CHACE Yeung  Preanesthetic Checklist  Completed: patient identified, IV checked, site marked, risks and benefits discussed, surgical consent, monitors and equipment checked, pre-op evaluation and timeout performed   Timeout performed at: 7/31/2024 11:42 AM  Peripheral Block  Patient position: laying flat  Prep: ChloraPrep  Patient monitoring: heart rate, cardiac monitor and continuous pulse ox  Block type: other (peng)  Laterality: left  Injection technique: single-shot  Guidance: ultrasound guided  Local infiltration: ropivacaine  Infiltration strength: 0.5 %  Dose: 20 mL  Needle  Needle gauge: 20 G  Needle localization: ultrasound guidance  Assessment  Injection assessment: negative aspiration for heme, no paresthesia on injection, incremental injection and local visualized surrounding nerve on ultrasound  Paresthesia pain: none  Heart rate change: no  Slow fractionated injection: yes  Additional Notes  Block requested by surgeon. Risks benefits discussed. Patient agreeable to JANY Block. Site marked. Time out complete. 50 mcg fentanyl iv for sedation.  Sterile prep and drape. Ultrasound guided. 20g 4 inch stimuplex needle used. Attempt x1 needle tip visualized entire procedure. Anatomy id.  3 ml 2% lidocaine 20 ml 0.5% ropivacaine with 4 mg decadron preservative free administered in divided doses. Aspiration every 3-5ml. Negative heme negative paresthesia. Pt tolerated procedure well.

## 2024-07-31 NOTE — PROGRESS NOTES
Physical Therapy    Physical Therapy Evaluation    Patient Name: Joseline Umana  MRN: [id number]  Today's Date: 7/31/2024   Time Calculation  Start Time: 1645  Stop Time: 1721  Time Calculation (min): 36 min  3332/3332-A    Assessment/Plan   PT Assessment  PT Assessment Results: Decreased strength, Decreased mobility, Orthopedic restrictions, Pain  Rehab Prognosis: Good  Barriers to Discharge: need for assist/walker all mobility  Evaluation/Treatment Tolerance: Patient limited by pain  End of Session Communication: PCT/NA/CTA  Assessment Comment: recommend  MOD INTENSITY REHAB to regain independent mobility needed for home alone. pt does give max self effort.  End of Session Patient Position: Bed, 3 rail up, Alarm on  IP OR SWING BED PT PLAN  Inpatient or Swing Bed: Inpatient  PT Plan  Treatment/Interventions: Bed mobility, Transfer training, Gait training, Neuromuscular re-education, Therapeutic exercise, Therapeutic activity, Home exercise program  PT Plan: Ongoing PT, OT consult  PT Frequency: Daily (1-2x/day)  PT Discharge Recommendations: Moderate intensity level of continued care  PT Recommended Transfer Status: Assist x1  PT - OK to Discharge: Yes    Subjective     Patient Active Problem List   Diagnosis    Closed nondisplaced fracture of greater trochanter of left femur, initial encounter (Multi)    Type 2 diabetes mellitus, without long-term current use of insulin (Multi)    Hypertension    UTI (urinary tract infection)    Left elbow contusion    Anemia    Closed displaced intertrochanteric fracture of left femur (Multi)    Closed displaced intertrochanteric fracture of left femur, initial encounter (Multi)       General Visit Information:  General  Reason for Referral: bending over to  dog bowl felt hip pop. displaced intertrochanteric hip fx left. SURG 7/31 ORIF TFN short nail  Referred By: BOLIVAR RUVALCABA. PT FOR RECENT SURG, WBAT  Past Medical History Relevant to Rehab: fall 6/14/24 with  L greater  "troch fx stable/non-surgical tx, DISCH TO SNF x3 wks then disch home. GOLD TKA, chronic anemia.  Family/Caregiver Present: No  Prior to Session Communication: Bedside nurse (RN approved PT)  Patient Position Received: Bed, 3 rail up, Alarm on  General Comment: pleasant and gives max self effort, agreed to attempt OOB as she felt she was wet    Home Living:  Home Living  Type of Home: House  Lives With: Alone  Home Adaptive Equipment: Walker rolling or standard  Home Layout: One level  Home Living Comments: 2 dtr's live closeby    Prior Level of Function:  Prior Function Per Pt/Caregiver Report  Level of [place]: Independent with ADLs and functional transfers, Independent with homemaking with ambulation  Ambulatory Assistance: Needs assistance  Transfers: Assistive device  Gait: Assistive device (pt admits she was using walker when left rehab then quit using it \"maybe should have kept using it\")  Prior Function Comments: care for 110# \"rescue dog\" black lab    Precautions:  Precautions  LE Weight Bearing Status: Weight Bearing as Tolerated  Medical Precautions: Fall precautions     Objective     Pain:  Pain Assessment  Pain Assessment: 0-10  0-10 (Numeric) Pain Score: 8  Pain Type: Surgical pain  Pain Location: Hip  Pain Orientation: Left    Cognition:  Cognition  Orientation Level: Oriented X4  Insight: Mild    General Assessments:  General Observation  General Observation: transfer OOB--needed prolonged time d/t pain as pt wanted to try to do herself. stood and took few steps bed<>BSC. RTB and performed LE exercise. ICE to left hip and positioning for comfort   Activity Tolerance  Endurance: Decreased tolerance for upright activites  Activity Tolerance Comments: limited by pain only     Dynamic Sitting Balance  Dynamic Sitting-Comments: good  Dynamic Standing Balance  Dynamic Standing-Comments: fair- walker and staff support    Functional Assessments:     Bed Mobility 1  Bed Mobility 1: Supine to sitting, " Sitting to supine  Level of Assistance 1: Minimum assistance  Transfer 1  Transfer From 1: Bed to  Transfer to 1: Commode-standard  Technique 1: Sit to stand, Stand to sit  Transfer Device 1: Walker  Transfer Level of Assistance 1: Moderate assistance, +1 to manage equipment  Ambulation/Gait Training 1  Device 1: Rolling walker  Gait Support Devices: Gait belt  Assistance 1: Moderate assistance  Quality of Gait 1: Antalgic (minimal WB tolerance LLE: alternated between antalgic quick steps or swiveling feet)  Comments/Distance (ft) 1: 2, 2  Stairs  Stairs: No       Extremity/Trunk Assessments:        RLE   RLE : Within Functional Limits  LLE   LLE : Exceptions to WFL  AROM LLE (degrees)  LLE AROM Comment: grossly 25% pain guarding (improved to 50% with ROM exercise)  Strength LLE  LLE Overall Strength: Unable to assess, Due to pain (guarding)    Outcome Measures:     [facility] Basic Mobility  Turning from your back to your side while in a flat bed without using bedrails: A little  Moving from lying on your back to sitting on the side of a flat bed without using bedrails: A little  Moving to and from bed to chair (including a wheelchair): A lot  Standing up from a chair using your arms (e.g. wheelchair or bedside chair): A lot  To walk in hospital room: Total  Climbing 3-5 steps with railing: Total  Basic Mobility - Total Score: 12     Goals:  Encounter Problems       Encounter Problems (Active)       Mobility       STG - Patient will ambulate household distance using walker unassisted       Start:  07/31/24    Expected End:  08/14/24               PT Transfers       STG - Patient will demonstrate safe transfer techniques using walker unassisted       Start:  07/31/24    Expected End:  08/14/24               Pain - Adult          Strengthening        Pt will perform 10+ reps AAROM/AROM/RROM BLE to improve functional strength needed for improved mobility.        Start:  07/31/24    Expected End:  08/14/24                  Education Documentation  Home Exercise Program, taught by Arianna Perea, PT at 7/31/2024  5:56 PM.  Learner: Patient  Readiness: Acceptance  Method: Explanation, Demonstration, Handout  Response: Verbalizes Understanding, Needs Reinforcement    Mobility Training, taught by Arianna Perea, PT at 7/31/2024  5:56 PM.  Learner: Patient  Readiness: Acceptance  Method: Explanation, Demonstration, Handout  Response: Verbalizes Understanding, Needs Reinforcement    Education Comments  No comments found.

## 2024-07-31 NOTE — PROGRESS NOTES
Social work consult placed for discharge planning. SW reviewed pt's chart and communicated with TCC. No SW needs foreseen at this time. SW signing off; available upon request.    Dominic Salazar, MSW, LSW ([id number])   Care Transitions

## 2024-07-31 NOTE — PROGRESS NOTES
"Joseline Umana is a 89 y.o. female on day 1 of admission presenting with Closed displaced intertrochanteric fracture of left femur (Multi).      Subjective   Joseline Umana is a 89 y.o. female who sustained a left greater trochanter fracture due to a fall on 6/14/24. She was admitted, seen by ortho and not felt to be a surgical candidate. She was discharged to SNF. She presented 7/30/24 with left leg and hip pain. She was discharged from the SNF two weeks and was doing \"great\" until she bent over at home to feed her dog today. She felt a sharp pain in the left hip/leg area. She denied any fall onto the leg or any other injury. She has no pain at rest. XR imaging revealed an acute intertrochanteric fracture of the left hip.     7/31/2024: No acute events overnight. Vitals stable. Has chronic anemia ~ baseline 9.3  She was seen by orthopedic surgery (Dr. Kaufman) who plans ORIF @noon         Review of Systems   Constitutional:  Negative for appetite change, chills, diaphoresis, fatigue and fever.   HENT:  Negative for congestion, ear pain, facial swelling, hearing loss, nosebleeds, sore throat, tinnitus and trouble swallowing.    Eyes:  Negative for pain.   Respiratory:  Negative for cough, chest tightness, shortness of breath and wheezing.    Cardiovascular:  Negative for chest pain, palpitations and leg swelling.   Gastrointestinal:  Negative for abdominal pain, blood in stool, constipation, diarrhea, nausea and vomiting.   Genitourinary:  Negative for dysuria, flank pain, frequency, hematuria and urgency.   Musculoskeletal:  Negative for back pain and joint swelling.        +L hip pain   Skin:  Negative for rash and wound.   Neurological:  Negative for dizziness, syncope, weakness, light-headedness, numbness and headaches.   Hematological:  Does not bruise/bleed easily.   Psychiatric/Behavioral:  Negative for behavioral problems, hallucinations and suicidal ideas.           Objective     Last Recorded Vitals  BP " 123/64 (BP Location: Right arm, Patient Position: Lying)   Pulse 73   Temp 35.8 °C (96.4 °F) (Temporal)   Resp 13   Wt (!) 42.6 kg (94 lb)   SpO2 97%     Image Results  XR femur left 2+ views    Result Date: 7/30/2024  Interpreted By:  Tj Brizuela, STUDY: XR PELVIS 1-2 VIEWS 7/30/2024 3:05 pm   INDICATION: Signs/Symptoms:pain in left leg,hx of recent fracture   COMPARISON: 06/14/2024   ACCESSION NUMBER(S): [id number]   ORDERING CLINICIAN: LORETTA HERRING   TECHNIQUE: A single AP view of the pelvis as well as AP and lateral views of the left femur were obtained.   FINDINGS: The patient is status post left total knee arthroplasty. A mildly displaced comminuted fracture is seen through the inter trochanteric space of the left femur. Mild-to-moderate degenerative changes are seen in the hips and sacroiliac joints bilaterally. Rounded calcifications are seen with throughout the pelvis, most consistent with phleboliths.       1.  Inter trochanteric fracture of the left hip, as above. 2. Postoperative and degenerative changes, as described above.   MACRO: None.   Signed by: Tj Brizuela 7/30/2024 3:23 PM Dictation workstation:   [id number]    XR pelvis 1-2 views    Result Date: 7/30/2024  Interpreted By:  Tj Brizuela, STUDY: XR PELVIS 1-2 VIEWS 7/30/2024 3:05 pm   INDICATION: Signs/Symptoms:pain in left leg,hx of recent fracture   COMPARISON: 06/14/2024   ACCESSION NUMBER(S): [id number]   ORDERING CLINICIAN: LORETTA HERRING   TECHNIQUE: A single AP view of the pelvis as well as AP and lateral views of the left femur were obtained.   FINDINGS: The patient is status post left total knee arthroplasty. A mildly displaced comminuted fracture is seen through the inter trochanteric space of the left femur. Mild-to-moderate degenerative changes are seen in the hips and sacroiliac joints bilaterally. Rounded calcifications are seen with throughout the pelvis, most consistent with phleboliths.       1.  Inter trochanteric  fracture of the left hip, as above. 2. Postoperative and degenerative changes, as described above.   MACRO: None.   Signed by: Tj Brizuela 7/30/2024 3:23 PM Dictation workstation:   [id number]       Lab Results  Results for orders placed or performed during the hospital encounter of 07/30/24 (from the past 24 hour(s))   CBC and Auto Differential   Result Value Ref Range    WBC 10.5 4.4 - 11.3 x10*3/uL    nRBC 0.0 0.0 - 0.0 /100 WBCs    RBC 3.45 (L) 4.00 - 5.20 x10*6/uL    Hemoglobin 10.6 (L) 12.0 - 16.0 g/dL    Hematocrit 32.1 (L) 36.0 - 46.0 %    MCV 93 80 - 100 fL    MCH 30.7 26.0 - 34.0 pg    MCHC 33.0 32.0 - 36.0 g/dL    RDW 13.8 11.5 - 14.5 %    Platelets 268 150 - 450 x10*3/uL    Neutrophils % 83.7 40.0 - 80.0 %    Immature Granulocytes %, Automated 0.4 0.0 - 0.9 %    Lymphocytes % 7.6 13.0 - 44.0 %    Monocytes % 7.5 2.0 - 10.0 %    Eosinophils % 0.5 0.0 - 6.0 %    Basophils % 0.3 0.0 - 2.0 %    Neutrophils Absolute 8.78 (H) 1.60 - 5.50 x10*3/uL    Immature Granulocytes Absolute, Automated 0.04 0.00 - 0.50 x10*3/uL    Lymphocytes Absolute 0.80 0.80 - 3.00 x10*3/uL    Monocytes Absolute 0.79 0.05 - 0.80 x10*3/uL    Eosinophils Absolute 0.05 0.00 - 0.40 x10*3/uL    Basophils Absolute 0.03 0.00 - 0.10 x10*3/uL   Comprehensive metabolic panel   Result Value Ref Range    Glucose 115 (H) 74 - 99 mg/dL    Sodium 132 (L) 136 - 145 mmol/L    Potassium 4.7 3.5 - 5.3 mmol/L    Chloride 100 98 - 107 mmol/L    Bicarbonate 22 21 - 32 mmol/L    Anion Gap 15 10 - 20 mmol/L    Urea Nitrogen 43 (H) 6 - 23 mg/dL    Creatinine 0.80 0.50 - 1.05 mg/dL    eGFR 69 >60 mL/min/1.73m*2    Calcium 9.5 8.6 - 10.3 mg/dL    Albumin 4.0 3.4 - 5.0 g/dL    Alkaline Phosphatase 108 33 - 136 U/L    Total Protein 7.2 6.4 - 8.2 g/dL    AST 15 9 - 39 U/L    Bilirubin, Total 0.5 0.0 - 1.2 mg/dL    ALT 9 7 - 45 U/L   Protime-INR   Result Value Ref Range    Protime 11.7 9.8 - 12.8 seconds    INR 1.0 0.9 - 1.1   aPTT   Result Value Ref Range     aPTT 31 27 - 38 seconds   CBC   Result Value Ref Range    WBC 6.2 4.4 - 11.3 x10*3/uL    nRBC 0.0 0.0 - 0.0 /100 WBCs    RBC 3.07 (L) 4.00 - 5.20 x10*6/uL    Hemoglobin 9.3 (L) 12.0 - 16.0 g/dL    Hematocrit 29.2 (L) 36.0 - 46.0 %    MCV 95 80 - 100 fL    MCH 30.3 26.0 - 34.0 pg    MCHC 31.8 (L) 32.0 - 36.0 g/dL    RDW 13.8 11.5 - 14.5 %    Platelets 228 150 - 450 x10*3/uL   Basic metabolic panel   Result Value Ref Range    Glucose 104 (H) 74 - 99 mg/dL    Sodium 134 (L) 136 - 145 mmol/L    Potassium 4.8 3.5 - 5.3 mmol/L    Chloride 105 98 - 107 mmol/L    Bicarbonate 23 21 - 32 mmol/L    Anion Gap 11 10 - 20 mmol/L    Urea Nitrogen 39 (H) 6 - 23 mg/dL    Creatinine 0.71 0.50 - 1.05 mg/dL    eGFR 80 >60 mL/min/1.73m*2    Calcium 8.6 8.6 - 10.3 mg/dL        Medications  Scheduled medications:  amLODIPine, 10 mg, oral, Daily  atorvastatin, 40 mg, oral, Nightly  levothyroxine, 112 mcg, oral, Daily  losartan, 50 mg, oral, Daily  pantoprazole, 40 mg, oral, Daily before breakfast  phenytoin ER, 100 mg, oral, TID  sennosides, 2 tablet, oral, BID      Continuous medications:     PRN medications:  PRN medications: acetaminophen, morphine, traMADol     Physical Exam  Constitutional:       General: She is not in acute distress.     Appearance: Normal appearance.      Comments: Elderly   HENT:      Head: Normocephalic and atraumatic.      Right Ear: External ear normal.      Left Ear: External ear normal.      Nose: Nose normal.      Mouth/Throat:      Mouth: Mucous membranes are moist.      Pharynx: Oropharynx is clear.   Eyes:      Extraocular Movements: Extraocular movements intact.      Conjunctiva/sclera: Conjunctivae normal.      Pupils: Pupils are equal, round, and reactive to light.   Cardiovascular:      Rate and Rhythm: Normal rate and regular rhythm.      Pulses: Normal pulses.      Heart sounds: Normal heart sounds.   Pulmonary:      Effort: Pulmonary effort is normal. No respiratory distress.      Breath sounds:  Normal breath sounds. No wheezing, rhonchi or rales.   Abdominal:      General: Bowel sounds are normal.      Palpations: Abdomen is soft.      Tenderness: There is no abdominal tenderness. There is no right CVA tenderness, left CVA tenderness, guarding or rebound.   Musculoskeletal:      Cervical back: Normal range of motion and neck supple.      Comments: Did not attempt ROM of L hip  NVS intact distal to fracture site   Skin:     General: Skin is warm and dry.      Capillary Refill: Capillary refill takes less than 2 seconds.      Findings: No lesion or rash.   Neurological:      General: No focal deficit present.      Mental Status: She is alert and oriented to person, place, and time. Mental status is at baseline.   Psychiatric:         Mood and Affect: Mood normal.         Behavior: Behavior normal.                  Code Status  Full Code     Assessment/Plan      Acute left intertrochanteric fracture   Hx left greater trochanter fracture due to a fall on 6/14/24  Pt denies cardiac and respiratory symptoms, she has no hx of pulmonary or cardiac disease  EKG shows NSR HR 86 and no evidence of myocardial ischemia, echo 2022 revealed normal LVD w/moderate-severe MR  She has 0 risk factors for this procedure and has <= 0.4% risk of a major cardiac event per the Revised Cardiac Risk Index  She was seen by orthopedic surgery (Dr. Kaufman) who plans ORIF @noon  PT/OT post-op  Pain control PRN     NIDDM-II diet controlled  Appears to be diet controlled, A1c was 6.8 on 6/14/24, pt is not on diabetic meds at home, BMP is pending     Chronic anemia, at baseline      HTN    BP is controlled, continue home meds    Hypothyroidism  Continue home medications      DVT ppx: Per ortho post-op     Please see orders for more complete plan    Lisa Irizarry PA-C

## 2024-08-01 LAB
ANION GAP SERPL CALC-SCNC: 11 MMOL/L (ref 10–20)
ATRIAL RATE: 86 BPM
ATRIAL RATE: 89 BPM
BUN SERPL-MCNC: 31 MG/DL (ref 6–23)
CALCIUM SERPL-MCNC: 7.8 MG/DL (ref 8.6–10.3)
CHLORIDE SERPL-SCNC: 105 MMOL/L (ref 98–107)
CO2 SERPL-SCNC: 21 MMOL/L (ref 21–32)
CREAT SERPL-MCNC: 0.72 MG/DL (ref 0.5–1.05)
EGFRCR SERPLBLD CKD-EPI 2021: 79 ML/MIN/1.73M*2
ERYTHROCYTE [DISTWIDTH] IN BLOOD BY AUTOMATED COUNT: 13.7 % (ref 11.5–14.5)
GLUCOSE SERPL-MCNC: 86 MG/DL (ref 74–99)
HCT VFR BLD AUTO: 24.7 % (ref 36–46)
HGB BLD-MCNC: 7.8 G/DL (ref 12–16)
MAGNESIUM SERPL-MCNC: 1.58 MG/DL (ref 1.6–2.4)
MCH RBC QN AUTO: 30.1 PG (ref 26–34)
MCHC RBC AUTO-ENTMCNC: 31.6 G/DL (ref 32–36)
MCV RBC AUTO: 95 FL (ref 80–100)
NRBC BLD-RTO: 0 /100 WBCS (ref 0–0)
P AXIS: 63 DEGREES
P AXIS: 93 DEGREES
PLATELET # BLD AUTO: 192 X10*3/UL (ref 150–450)
POTASSIUM SERPL-SCNC: 4.2 MMOL/L (ref 3.5–5.3)
PR INTERVAL: 144 MS
PR INTERVAL: 156 MS
Q ONSET: 253 MS
Q ONSET: 255 MS
QRS COUNT: 14 BEATS
QRS COUNT: 14 BEATS
QRS DURATION: 81 MS
QRS DURATION: 86 MS
QT INTERVAL: 353 MS
QT INTERVAL: 359 MS
QTC CALCULATION(BAZETT): 430 MS
QTC CALCULATION(BAZETT): 435 MS
QTC FREDERICIA: 405 MS
QTC FREDERICIA: 405 MS
R AXIS: 15 DEGREES
R AXIS: 21 DEGREES
RBC # BLD AUTO: 2.59 X10*6/UL (ref 4–5.2)
SODIUM SERPL-SCNC: 133 MMOL/L (ref 136–145)
T AXIS: -28 DEGREES
T AXIS: 58 DEGREES
T OFFSET: 431 MS
T OFFSET: 432 MS
VENTRICULAR RATE: 86 BPM
VENTRICULAR RATE: 91 BPM
WBC # BLD AUTO: 7.7 X10*3/UL (ref 4.4–11.3)

## 2024-08-01 PROCEDURE — 97112 NEUROMUSCULAR REEDUCATION: CPT | Mod: GP,CQ | Performed by: PHYSICAL THERAPY ASSISTANT

## 2024-08-01 PROCEDURE — 97165 OT EVAL LOW COMPLEX 30 MIN: CPT | Mod: GO | Performed by: OCCUPATIONAL THERAPIST

## 2024-08-01 PROCEDURE — 97116 GAIT TRAINING THERAPY: CPT | Mod: GP,CQ | Performed by: PHYSICAL THERAPY ASSISTANT

## 2024-08-01 PROCEDURE — 85027 COMPLETE CBC AUTOMATED: CPT | Performed by: SPECIALIST

## 2024-08-01 PROCEDURE — 2500000004 HC RX 250 GENERAL PHARMACY W/ HCPCS (ALT 636 FOR OP/ED): Mod: JZ | Performed by: SPECIALIST

## 2024-08-01 PROCEDURE — 99233 SBSQ HOSP IP/OBS HIGH 50: CPT | Performed by: PHYSICIAN ASSISTANT

## 2024-08-01 PROCEDURE — 36415 COLL VENOUS BLD VENIPUNCTURE: CPT | Performed by: SPECIALIST

## 2024-08-01 PROCEDURE — 2500000004 HC RX 250 GENERAL PHARMACY W/ HCPCS (ALT 636 FOR OP/ED): Performed by: PHYSICIAN ASSISTANT

## 2024-08-01 PROCEDURE — 83735 ASSAY OF MAGNESIUM: CPT | Performed by: SPECIALIST

## 2024-08-01 PROCEDURE — 2500000001 HC RX 250 WO HCPCS SELF ADMINISTERED DRUGS (ALT 637 FOR MEDICARE OP): Performed by: SPECIALIST

## 2024-08-01 PROCEDURE — 82374 ASSAY BLOOD CARBON DIOXIDE: CPT | Performed by: SPECIALIST

## 2024-08-01 PROCEDURE — 1210000001 HC SEMI-PRIVATE ROOM DAILY

## 2024-08-01 RX ORDER — LANOLIN ALCOHOL/MO/W.PET/CERES
400 CREAM (GRAM) TOPICAL DAILY
Status: DISCONTINUED | OUTPATIENT
Start: 2024-08-01 | End: 2024-08-02 | Stop reason: HOSPADM

## 2024-08-01 ASSESSMENT — ENCOUNTER SYMPTOMS
WEAKNESS: 0
FATIGUE: 0
EYE PAIN: 0
JOINT SWELLING: 0
VOMITING: 0
SHORTNESS OF BREATH: 0
CONSTIPATION: 0
FREQUENCY: 0
PALPITATIONS: 0
DYSURIA: 0
NAUSEA: 0
FLANK PAIN: 0
LIGHT-HEADEDNESS: 0
WHEEZING: 0
CHEST TIGHTNESS: 0
APPETITE CHANGE: 0
BRUISES/BLEEDS EASILY: 0
DIAPHORESIS: 0
NUMBNESS: 0
BACK PAIN: 0
BLOOD IN STOOL: 0
FEVER: 0
CHILLS: 0
FACIAL SWELLING: 0
HEMATURIA: 0
DIARRHEA: 0
DIZZINESS: 0
COUGH: 0
ABDOMINAL PAIN: 0
TROUBLE SWALLOWING: 0
WOUND: 0
SORE THROAT: 0
HEADACHES: 0
HALLUCINATIONS: 0

## 2024-08-01 ASSESSMENT — COGNITIVE AND FUNCTIONAL STATUS - GENERAL
CLIMB 3 TO 5 STEPS WITH RAILING: TOTAL
MOVING FROM LYING ON BACK TO SITTING ON SIDE OF FLAT BED WITH BEDRAILS: A LITTLE
TURNING FROM BACK TO SIDE WHILE IN FLAT BAD: A LITTLE
STANDING UP FROM CHAIR USING ARMS: A LOT
MOBILITY SCORE: 12
MOBILITY SCORE: 12
EATING MEALS: A LITTLE
MOVING TO AND FROM BED TO CHAIR: A LOT
EATING MEALS: A LITTLE
TURNING FROM BACK TO SIDE WHILE IN FLAT BAD: A LOT
DRESSING REGULAR LOWER BODY CLOTHING: A LOT
MOVING TO AND FROM BED TO CHAIR: A LOT
CLIMB 3 TO 5 STEPS WITH RAILING: TOTAL
TURNING FROM BACK TO SIDE WHILE IN FLAT BAD: A LITTLE
WALKING IN HOSPITAL ROOM: TOTAL
TOILETING: A LOT
CLIMB 3 TO 5 STEPS WITH RAILING: TOTAL
DRESSING REGULAR LOWER BODY CLOTHING: TOTAL
WALKING IN HOSPITAL ROOM: A LOT
HELP NEEDED FOR BATHING: A LOT
HELP NEEDED FOR BATHING: A LOT
DRESSING REGULAR UPPER BODY CLOTHING: A LOT
PERSONAL GROOMING: A LITTLE
DAILY ACTIVITIY SCORE: 15
MOVING FROM LYING ON BACK TO SITTING ON SIDE OF FLAT BED WITH BEDRAILS: A LITTLE
STANDING UP FROM CHAIR USING ARMS: A LOT
WALKING IN HOSPITAL ROOM: TOTAL
PERSONAL GROOMING: A LITTLE
DAILY ACTIVITIY SCORE: 15
PERSONAL GROOMING: A LITTLE
MOVING TO AND FROM BED TO CHAIR: A LOT
DRESSING REGULAR UPPER BODY CLOTHING: A LOT
TOILETING: A LITTLE
TOILETING: A LITTLE
MOVING FROM LYING ON BACK TO SITTING ON SIDE OF FLAT BED WITH BEDRAILS: A LITTLE
DRESSING REGULAR LOWER BODY CLOTHING: A LOT
HELP NEEDED FOR BATHING: A LOT
STANDING UP FROM CHAIR USING ARMS: A LOT
DRESSING REGULAR UPPER BODY CLOTHING: A LITTLE

## 2024-08-01 ASSESSMENT — PAIN SCALES - GENERAL
PAINLEVEL_OUTOF10: 0 - NO PAIN
PAINLEVEL_OUTOF10: 0 - NO PAIN
PAINLEVEL_OUTOF10: 9
PAINLEVEL_OUTOF10: 0 - NO PAIN
PAINLEVEL_OUTOF10: 3

## 2024-08-01 ASSESSMENT — PAIN - FUNCTIONAL ASSESSMENT
PAIN_FUNCTIONAL_ASSESSMENT: 0-10

## 2024-08-01 NOTE — PROGRESS NOTES
Physical Therapy    Physical Therapy Treatment    Patient Name: Joseline Umana  MRN: [id number]  Today's Date: 8/1/2024  Time Calculation  Start Time: 1118  Stop Time: 1142  Time Calculation (min): 24 min    Assessment/Plan   PT Assessment  End of Session Communication: Bedside nurse (RN called to room secondary to IV bleeding. Rn was addressing issue at end of session,)  Assessment Comment: pt would benefit from further skilled PT services to improve mobility and safety.  End of Session Patient Position: Up in chair, Alarm on     PT Plan  Treatment/Interventions: Bed mobility, Transfer training, Gait training, Neuromuscular re-education, Therapeutic exercise, Therapeutic activity, Home exercise program  PT Plan: Ongoing PT, OT consult  PT Frequency: Daily (1-2x/day)  PT Discharge Recommendations: Moderate intensity level of continued care  PT Recommended Transfer Status: Assist x1  PT - OK to Discharge: Yes      General Visit Information:   PT  Visit  PT Received On: 08/01/24  Response to Previous Treatment: Patient with no complaints from previous session.  General  Reason for Referral: bending over to  dog bowl felt hip pop. displaced intertrochanteric hip fx left. SURG 7/31 ORIF TFN short nail  Referred By: BOLIVAR RUVALCABA. PT FOR RECENT SURG, WBAT  Past Medical History Relevant to Rehab: fall 6/14/24 with  L greater troch fx stable/non-surgical tx, DISCH TO SNF x3 wks then disch home. GOLD TKA, chronic anemia.  Patient Position Received: Bed, 3 rail up, Alarm on  Preferred Learning Style: verbal  General Comment: pt agreeabloe to PT and cleared by RN. pt is c/o pain in  L hip  with mobility but no pain at rest.      Precautions:  Precautions  Medical Precautions: Fall precautions    Pain:  Pain Assessment  Pain Assessment: 0-10  0-10 (Numeric) Pain Score: 9 (with mobilty and 0/10 at rest.)              Treatments:  Balance/Neuromuscular Re-Education  Balance/Neuromuscular Re-Education Activity Performed:  Yes  Balance/Neuromuscular Re-Education Activity 1: pt sat EOb x 5 min with no LOB and CGA. pt stood with MIN Ax1 to maintain. She was impulsive but had no LOB.    Bed Mobility  Bed Mobility: Yes  Bed Mobility 1  Bed Mobility 1: Supine to sitting  Level of Assistance 1: Minimum assistance  Bed Mobility Comments 1: cues for safety and technique.    Ambulation/Gait Training  Ambulation/Gait Training Performed: Yes  Ambulation/Gait Training 1  Surface 1: Level tile  Device 1: Rolling walker  Assistance 1: Minimum assistance, Minimal verbal cues (AX2)  Quality of Gait 1: Antalgic, Shuffling gait, Forward flexed posture  Comments/Distance (ft) 1: 5 feet with MAX cues for safety. pt is impulsive.  Transfers  Transfer: Yes  Transfer 1  Technique 1: Sit to stand, Stand to sit  Transfer Device 1: Walker  Transfer Level of Assistance 1: Minimum assistance, +2, Moderate verbal cues  Trials/Comments 1: cues for proper hand placement and safety  Transfers 2  Technique 2: Stand pivot  Transfer Device 2: Walker  Transfer Level of Assistance 2: Minimum assistance, +2, Minimal verbal cues  Trials/Comments 2: pt required cues for walker safety and to decrease fall risk.    Outcome Measures:  [facility] Basic Mobility  Turning from your back to your side while in a flat bed without using bedrails: A little  Moving from lying on your back to sitting on the side of a flat bed without using bedrails: A lot  Moving to and from bed to chair (including a wheelchair): A lot  Standing up from a chair using your arms (e.g. wheelchair or bedside chair): A lot  To walk in hospital room: A lot  Climbing 3-5 steps with railing: Total  Basic Mobility - Total Score: 12    Education Documentation  Home Exercise Program, taught by Halima Chicas PTA at 8/1/2024 12:03 PM.  Learner: Patient  Readiness: Acceptance  Method: Explanation  Response: Verbalizes Understanding, Needs Reinforcement    Mobility Training, taught by Halima Chicas PTA at 8/1/2024 12:03  PM.  Learner: Patient  Readiness: Acceptance  Method: Explanation  Response: Verbalizes Understanding, Needs Reinforcement    Education Comments  No comments found.             Encounter Problems       Encounter Problems (Active)       Mobility       STG - Patient will ambulate household distance using walker unassisted (Progressing)       Start:  07/31/24    Expected End:  08/14/24               PT Transfers       STG - Patient will demonstrate safe transfer techniques using walker unassisted (Progressing)       Start:  07/31/24    Expected End:  08/14/24               Pain - Adult          Strengthening        Pt will perform 10+ reps AAROM/AROM/RROM BLE to improve functional strength needed for improved mobility.  (Progressing)       Start:  07/31/24    Expected End:  08/14/24

## 2024-08-01 NOTE — PROGRESS NOTES
Occupational Therapy    Evaluation    Patient Name: Joseline Umana  MRN: [id number]  Today's Date: 8/1/2024  Time Calculation  Start Time: 1122  Stop Time: 1145  Time Calculation (min): 23 min  3332/3332-A    Assessment  IP OT Assessment  OT Assessment: pt. high fall risk, assist for LB ADLs  Prognosis: Good  Medical Staff Made Aware: Yes  End of Session Communication: Bedside nurse  End of Session Patient Position: Up in chair, Alarm on       08/01/24 1122   Inpatient Plan   Treatment Interventions ADL retraining;Functional transfer training   OT Frequency 4 times per week   OT - OK to Discharge Yes  ((when medically approp.))   OT Discharge Recommendations Moderate intensity level of continued care   OT Recommended Transfer Status Assist of 1     Subjective     Current Problem:  1. Closed displaced intertrochanteric fracture of left femur, initial encounter (Multi)  Case Request Operating Room: Hip Fracture ORIF w/Nail Recon    Case Request Operating Room: Hip Fracture ORIF w/Nail Recon          General:  General  Reason for Referral: L hip ORIF 7/31  Referred By: Mandeep  Past Medical History Relevant to Rehab: L greater troch. fx. 6/24 with stay at SNF, bilat. TKAs, anemia  Family/Caregiver Present: No  Co-Treatment: PT  Co-Treatment Reason: to maximize safe mobility  Prior to Session Communication: Bedside nurse  Patient Position Received: Bed, 3 rail up, Alarm on  General Comment: pt. agreeable to OOB    Precautions:  LE Weight Bearing Status: Weight Bearing as Tolerated  Medical Precautions: Fall precautions    Vital Signs:see nurses notes        Pain:  Pain Assessment  Pain Assessment: 0-10  0-10 (Numeric) Pain Score: 0 - No pain    Objective     Cognition:  Overall Cognitive Status: Within Functional Limits  Orientation Level: Oriented X4         Home Living:  Type of Home: House  Lives With: Alone  Home Adaptive Equipment: Walker rolling or standard  Home Layout: One level     Prior Function:  Level of  [place]: Independent with ADLs and functional transfers, Independent with homemaking with ambulation  Receives Help From:  (dtrs nearby)  Homemaking Assistance: Independent  Ambulatory Assistance: Independent  Prior Function Comments: cares for large labrador dog    IADL History:  Homemaking Responsibilities: Yes  Meal Prep Responsibility: Primary  Laundry Responsibility: Primary  Cleaning Responsibility: Primary    ADL:  Bathing Assistance:  (assist for LB bathing)  LE Dressing Assistance: Total  Toileting Assistance with Device:  (Min.A X 2 trfr. to/from [facility] wtih FWW)    Activity Tolerance:  Endurance: Decreased tolerance for upright activites    Bed Mobility/Transfers:   Bed Mobility  Bed Mobility: Yes (Mod.A supine-to-sit)  Transfers  Transfer: Yes (Min.A X 2 trfr. with FWW)          Standing Balance:  Static Standing Balance  Static Standing-Balance Support:  (Min.A X 2 with FWW)    Vision: Vision - Basic Assessment  Current Vision: No visual deficits     Sensation:  Light Touch: No apparent deficits    Strength:  Strength Comments: UEs WFL but decreased shoulder flex.    Perception:  Inattention/Neglect: Appears intact    Coordination:  Movements are Fluid and Coordinated: Yes     Hand Function:  Hand Function  Gross Grasp: Functional      Outcome Measures: [facility] Daily Activity  Putting on and taking off regular lower body clothing: Total  Bathing (including washing, rinsing, drying): A lot  Putting on and taking off regular upper body clothing: A little  Toileting, which includes using toilet, bedpan or urinal: A lot  Taking care of personal grooming such as brushing teeth: A little  Eating Meals: None  Daily Activity - Total Score: 15                    EDUCATION:     Education Documentation      ADL Training, taught by Daja Ortiz OT at 8/1/2024  2:49 PM.  Learner: Patient  Readiness: Acceptance  Method: Demonstration  Response: Needs Reinforcement  Comment: funbuddy. trfr. safety with  FWW    Education Comments  No comments found.        Goals:   Encounter Problems       Encounter Problems (Active)       ADLs       Demo. good understanding of adaptive equip. for LB ADLs PRN  (Progressing)       Start:  08/01/24    Expected End:  08/08/24               BALANCE       Toil. at least 3 mins. dyn. stand with FWW for ADLs.with CGA  (Progressing)       Start:  08/01/24    Expected End:  08/08/24               MOBILITY       Demo. Min.A func. mobility to/from bathroom with FWW.  (Progressing)       Start:  08/01/24    Expected End:  08/08/24

## 2024-08-01 NOTE — CARE PLAN
The patient's goals for the shift include      The clinical goals for the shift include safety    Problem: Fall/Injury  Goal: Not fall by end of shift  Outcome: Progressing     Problem: Pain  Goal: Takes deep breaths with improved pain control throughout the shift  Outcome: Progressing

## 2024-08-01 NOTE — PROGRESS NOTES
"Joseline Umana is a 89 y.o. female on day 2 of admission presenting with Closed displaced intertrochanteric fracture of left femur (Multi).      Subjective   Joseline Umana is a 89 y.o. female who sustained a left greater trochanter fracture due to a fall on 6/14/24. She was admitted, seen by ortho and not felt to be a surgical candidate. She was discharged to SNF. She presented 7/30/24 with left leg and hip pain. She was discharged from the SNF two weeks and was doing \"great\" until she bent over at home to feed her dog today. She felt a sharp pain in the left hip/leg area. She denied any fall onto the leg or any other injury. She has no pain at rest. XR imaging revealed an acute intertrochanteric fracture of the left hip.     8/1/2024: Had some agitation overnight. Vitals stable. Has chronic, hgb 7.8 this morning, expected with fracture, surgical fixation and rehydration. Mag slightly low- replace.   Had ORIF with TFN short nail 7/31/24. Seen by orthopedics: \"Dressings can remain on for the first week and then be removed left open to air.  She may shower over the dressings.  She is weightbearing as tolerated with max assist/walker.  She will need to be seen in my office 2 weeks postop for'staple removal and x-ray.  I will be leaving town today returning Sunday p.m., can be reached by secure chat, Dr. Meza is following for on-call orthopedics.\"         Review of Systems   Constitutional:  Negative for appetite change, chills, diaphoresis, fatigue and fever.   HENT:  Negative for congestion, ear pain, facial swelling, hearing loss, nosebleeds, sore throat, tinnitus and trouble swallowing.    Eyes:  Negative for pain.   Respiratory:  Negative for cough, chest tightness, shortness of breath and wheezing.    Cardiovascular:  Negative for chest pain, palpitations and leg swelling.   Gastrointestinal:  Negative for abdominal pain, blood in stool, constipation, diarrhea, nausea and vomiting.   Genitourinary:  " Negative for dysuria, flank pain, frequency, hematuria and urgency.   Musculoskeletal:  Negative for back pain and joint swelling.        +L hip pain   Skin:  Negative for rash and wound.   Neurological:  Negative for dizziness, syncope, weakness, light-headedness, numbness and headaches.   Hematological:  Does not bruise/bleed easily.   Psychiatric/Behavioral:  Negative for behavioral problems, hallucinations and suicidal ideas.           Objective     Last Recorded Vitals  /68 (BP Location: Right arm, Patient Position: Lying) Comment: RN notified  Pulse 92   Temp 36.6 °C (97.8 °F) (Temporal)   Resp 16   Wt (!) 42.6 kg (94 lb)   SpO2 100%     Image Results  XR femur left 2+ views    Result Date: 7/30/2024  Interpreted By:  Tj Brizuela, STUDY: XR PELVIS 1-2 VIEWS 7/30/2024 3:05 pm   INDICATION: Signs/Symptoms:pain in left leg,hx of recent fracture   COMPARISON: 06/14/2024   ACCESSION NUMBER(S): [id number]   ORDERING CLINICIAN: LORETTA HERRING   TECHNIQUE: A single AP view of the pelvis as well as AP and lateral views of the left femur were obtained.   FINDINGS: The patient is status post left total knee arthroplasty. A mildly displaced comminuted fracture is seen through the inter trochanteric space of the left femur. Mild-to-moderate degenerative changes are seen in the hips and sacroiliac joints bilaterally. Rounded calcifications are seen with throughout the pelvis, most consistent with phleboliths.       1.  Inter trochanteric fracture of the left hip, as above. 2. Postoperative and degenerative changes, as described above.   MACRO: None.   Signed by: Tj Brizuela 7/30/2024 3:23 PM Dictation workstation:   [id number]    XR pelvis 1-2 views    Result Date: 7/30/2024  Interpreted By:  Tj Brizuela, STUDY: XR PELVIS 1-2 VIEWS 7/30/2024 3:05 pm   INDICATION: Signs/Symptoms:pain in left leg,hx of recent fracture   COMPARISON: 06/14/2024   ACCESSION NUMBER(S): [id number]   ORDERING CLINICIAN: LORETTA HERRING    TECHNIQUE: A single AP view of the pelvis as well as AP and lateral views of the left femur were obtained.   FINDINGS: The patient is status post left total knee arthroplasty. A mildly displaced comminuted fracture is seen through the inter trochanteric space of the left femur. Mild-to-moderate degenerative changes are seen in the hips and sacroiliac joints bilaterally. Rounded calcifications are seen with throughout the pelvis, most consistent with phleboliths.       1.  Inter trochanteric fracture of the left hip, as above. 2. Postoperative and degenerative changes, as described above.   MACRO: None.   Signed by: Tj Brizuela 7/30/2024 3:23 PM Dictation workstation:   [id number]       Lab Results  Results for orders placed or performed during the hospital encounter of 07/30/24 (from the past 24 hour(s))   Basic Metabolic Panel   Result Value Ref Range    Glucose 86 74 - 99 mg/dL    Sodium 133 (L) 136 - 145 mmol/L    Potassium 4.2 3.5 - 5.3 mmol/L    Chloride 105 98 - 107 mmol/L    Bicarbonate 21 21 - 32 mmol/L    Anion Gap 11 10 - 20 mmol/L    Urea Nitrogen 31 (H) 6 - 23 mg/dL    Creatinine 0.72 0.50 - 1.05 mg/dL    eGFR 79 >60 mL/min/1.73m*2    Calcium 7.8 (L) 8.6 - 10.3 mg/dL   CBC   Result Value Ref Range    WBC 7.7 4.4 - 11.3 x10*3/uL    nRBC 0.0 0.0 - 0.0 /100 WBCs    RBC 2.59 (L) 4.00 - 5.20 x10*6/uL    Hemoglobin 7.8 (L) 12.0 - 16.0 g/dL    Hematocrit 24.7 (L) 36.0 - 46.0 %    MCV 95 80 - 100 fL    MCH 30.1 26.0 - 34.0 pg    MCHC 31.6 (L) 32.0 - 36.0 g/dL    RDW 13.7 11.5 - 14.5 %    Platelets 192 150 - 450 x10*3/uL   Magnesium   Result Value Ref Range    Magnesium 1.58 (L) 1.60 - 2.40 mg/dL        Medications  Scheduled medications:  amLODIPine, 10 mg, oral, Daily  aspirin, 81 mg, oral, BID  atorvastatin, 40 mg, oral, Nightly  levothyroxine, 112 mcg, oral, Daily  losartan, 50 mg, oral, Daily  magnesium oxide, 400 mg, oral, Daily  pantoprazole, 40 mg, oral, Daily before breakfast  phenytoin ER, 100 mg,  oral, TID  sennosides, 2 tablet, oral, BID      Continuous medications:  sodium chloride 0.9%, 75 mL/hr, Last Rate: 75 mL/hr (08/01/24 0613)      PRN medications:  PRN medications: acetaminophen, morphine, traMADol     Physical Exam  Constitutional:       General: She is not in acute distress.     Appearance: Normal appearance.      Comments: Elderly   HENT:      Head: Normocephalic and atraumatic.      Right Ear: External ear normal.      Left Ear: External ear normal.      Nose: Nose normal.      Mouth/Throat:      Mouth: Mucous membranes are moist.      Pharynx: Oropharynx is clear.   Eyes:      Extraocular Movements: Extraocular movements intact.      Conjunctiva/sclera: Conjunctivae normal.      Pupils: Pupils are equal, round, and reactive to light.   Cardiovascular:      Rate and Rhythm: Normal rate and regular rhythm.      Pulses: Normal pulses.      Heart sounds: Normal heart sounds.   Pulmonary:      Effort: Pulmonary effort is normal. No respiratory distress.      Breath sounds: Normal breath sounds. No wheezing, rhonchi or rales.   Abdominal:      General: Bowel sounds are normal.      Palpations: Abdomen is soft.      Tenderness: There is no abdominal tenderness. There is no right CVA tenderness, left CVA tenderness, guarding or rebound.   Musculoskeletal:      Cervical back: Normal range of motion and neck supple.      Comments: Did not attempt ROM of L hip  NVS intact distal to fracture site   Skin:     General: Skin is warm and dry.      Capillary Refill: Capillary refill takes less than 2 seconds.      Findings: No lesion or rash.   Neurological:      General: No focal deficit present.      Mental Status: She is alert and oriented to person, place, and time. Mental status is at baseline.   Psychiatric:         Mood and Affect: Mood normal.         Behavior: Behavior normal.                  Code Status  Full Code     Assessment/Plan      Acute left intertrochanteric fracture   Hx left greater  trochanter fracture due to a fall on 6/14/24  Pt denies cardiac and respiratory symptoms, she has no hx of pulmonary or cardiac disease  EKG shows NSR HR 86 and no evidence of myocardial ischemia, echo 2022 revealed normal LVD w/moderate-severe MR  She has 0 risk factors for this procedure and has <= 0.4% risk of a major cardiac event per the Revised Cardiac Risk Index  She was seen by orthopedic surgery (Dr. Kaufman) who  performed ORIF with TFN short nail 7/31/24  PT/OT post-op  Pain control PRN     NIDDM-II diet controlled  Appears to be diet controlled, A1c was 6.8 on 6/14/24, pt is not on diabetic meds at home, BMP is pending     Acute on chronic anemia  Expected at this phase  Monitor H/H  Hemodynamically is stable  Transfuse if hgb <7 or has any acute changes     HTN    BP is controlled, continue home meds    Hypothyroidism  Continue home medications      Agitation  Event was Post- anesthesia and at night  Likely component of sundowning  High risk for hospital delirium   Delirium precautions:   - Bright lights during the day, keeps blinds up, switch all lights on   - Avoid disturbances at night. Encourage at least 6 hours uninterrupted sleep. Consider d/c 4am vitals check  - Avoid benzodiazepines, sedatives. Minimize opioids   - Avoid anti-cholinergics    - Avoid restraints. D/c mazariegos if possible.   - If requiring no to little insulin coverage, d/c sliding scale  - Use low dose haldol 0.5mg PO (IM if PO not possible) only PRN severe agitation where pt exhibits volatile behavior and is a threat to self or others. EKGs to monitor QTc   - Daily orientation to time and place by the staff   - Out of bed to chair few hours everyday  - Encourage stimulating activities during the day if possible      DVT ppx: Per ortho post-op (ASA 81mg twice daily x1 month)     Please see orders for more complete plan    Lisa Irizarry PA-C

## 2024-08-01 NOTE — NURSING NOTE
Pt very confused removing wrist bands and protective dressings possibly experiencing sundowners. Pt had to be re-oriented several times that she is actually in the hospital and not at home. Pt made several attempts to exit the bed. Bed alarm on

## 2024-08-01 NOTE — CARE PLAN
The patient's goals for the shift include      Problem: Fall/Injury  Goal: Not fall by end of shift  Outcome: Progressing  Goal: Be free from injury by end of the shift  Outcome: Progressing  Goal: Verbalize understanding of personal risk factors for fall in the hospital  Outcome: Progressing  Goal: Verbalize understanding of risk factor reduction measures to prevent injury from fall in the home  Outcome: Progressing  Goal: Use assistive devices by end of the shift  Outcome: Progressing  Goal: Pace activities to prevent fatigue by end of the shift  Outcome: Progressing     Problem: Skin  Goal: Decreased wound size/increased tissue granulation at next dressing change  Outcome: Progressing  Goal: Participates in plan/prevention/treatment measures  Outcome: Progressing  Flowsheets (Taken 8/1/2024 1102)  Participates in plan/prevention/treatment measures: Increase activity/out of bed for meals  Goal: Prevent/manage excess moisture  Outcome: Progressing  Flowsheets (Taken 8/1/2024 1102)  Prevent/manage excess moisture:   Cleanse incontinence/protect with barrier cream   Monitor for/manage infection if present  Goal: Prevent/minimize sheer/friction injuries  Outcome: Progressing  Flowsheets (Taken 8/1/2024 1102)  Prevent/minimize sheer/friction injuries:   Turn/reposition every 2 hours/use positioning/transfer devices   Use pull sheet   Increase activity/out of bed for meals  Goal: Promote/optimize nutrition  Outcome: Progressing  Flowsheets (Taken 8/1/2024 1102)  Promote/optimize nutrition:   Consume > 50% meals/supplements   Offer water/supplements/favorite foods  Goal: Promote skin healing  Outcome: Progressing     Problem: Pain  Goal: Takes deep breaths with improved pain control throughout the shift  Outcome: Progressing  Goal: Turns in bed with improved pain control throughout the shift  Outcome: Progressing  Goal: Walks with improved pain control throughout the shift  Outcome: Progressing  Goal: Performs ADL's with  improved pain control throughout shift  Outcome: Progressing  Goal: Participates in PT with improved pain control throughout the shift  Outcome: Progressing  Goal: Free from opioid side effects throughout the shift  Outcome: Progressing  Goal: Free from acute confusion related to pain meds throughout the shift  Outcome: Progressing     Problem: Pain - Adult  Goal: Verbalizes/displays adequate comfort level or baseline comfort level  Outcome: Progressing     Problem: Safety - Adult  Goal: Free from fall injury  Outcome: Progressing     Problem: Discharge Planning  Goal: Discharge to home or other facility with appropriate resources  Outcome: Progressing     Problem: Chronic Conditions and Co-morbidities  Goal: Patient's chronic conditions and co-morbidity symptoms are monitored and maintained or improved  Outcome: Progressing       The clinical goals for the shift include pain controlled throughout shift    See assessment and mar. Remains on room air. IVF as ordered. Assisted pt with ordering breakfast.

## 2024-08-01 NOTE — PROGRESS NOTES
"Joseline Umana is a 89 y.o. female on day 2 of admission presenting with Closed displaced intertrochanteric fracture of left femur (Multi).    Subjective   Patient is postop day 1 ORIF left intertrochanteric hip fracture.  She has operative site pain.  No other constitutional symptoms.       Objective     Operative lower extremity in neutral alignment.  Hip dressing dry and intact.  Passive motion to hip with only mild discomfort appropriate for postoperative timeframe.  Dermis is intact.  Neurovascular status is intact.  Minimal swelling, no signs of compartment syndrome.  Negative Homans.     Last Recorded Vitals  Blood pressure 123/67, pulse 84, temperature 36.5 °C (97.7 °F), temperature source Temporal, resp. rate 16, height 1.575 m (5' 2\"), weight (!) 42.6 kg (94 lb), SpO2 96%.  Intake/Output last 3 Shifts:  I/O last 3 completed shifts:  In: 2140 (50.2 mL/kg) [I.V.:1090 (25.6 mL/kg); IV Piggyback:1050]  Out: 0 (0 mL/kg)   Weight: 42.6 kg     Relevant Results      Scheduled medications  amLODIPine, 10 mg, oral, Daily  aspirin, 81 mg, oral, BID  atorvastatin, 40 mg, oral, Nightly  levothyroxine, 112 mcg, oral, Daily  losartan, 50 mg, oral, Daily  pantoprazole, 40 mg, oral, Daily before breakfast  phenytoin ER, 100 mg, oral, TID  sennosides, 2 tablet, oral, BID      Continuous medications  oxygen, 2 L/min, Last Rate: 2 L/min (07/31/24 1545)  sodium chloride 0.9%, 75 mL/hr, Last Rate: 75 mL/hr (08/01/24 0613)      PRN medications  PRN medications: acetaminophen, morphine, traMADol  Results for orders placed or performed during the hospital encounter of 07/30/24 (from the past 24 hour(s))   Basic Metabolic Panel   Result Value Ref Range    Glucose 86 74 - 99 mg/dL    Sodium 133 (L) 136 - 145 mmol/L    Potassium 4.2 3.5 - 5.3 mmol/L    Chloride 105 98 - 107 mmol/L    Bicarbonate 21 21 - 32 mmol/L    Anion Gap 11 10 - 20 mmol/L    Urea Nitrogen 31 (H) 6 - 23 mg/dL    Creatinine 0.72 0.50 - 1.05 mg/dL    eGFR 79 >60 " mL/min/1.73m*2    Calcium 7.8 (L) 8.6 - 10.3 mg/dL   CBC   Result Value Ref Range    WBC 7.7 4.4 - 11.3 x10*3/uL    nRBC 0.0 0.0 - 0.0 /100 WBCs    RBC 2.59 (L) 4.00 - 5.20 x10*6/uL    Hemoglobin 7.8 (L) 12.0 - 16.0 g/dL    Hematocrit 24.7 (L) 36.0 - 46.0 %    MCV 95 80 - 100 fL    MCH 30.1 26.0 - 34.0 pg    MCHC 31.6 (L) 32.0 - 36.0 g/dL    RDW 13.7 11.5 - 14.5 %    Platelets 192 150 - 450 x10*3/uL   Magnesium   Result Value Ref Range    Magnesium 1.58 (L) 1.60 - 2.40 mg/dL                            Assessment/Plan   Principal Problem:    Closed displaced intertrochanteric fracture of left femur (Multi)  Active Problems:    Closed displaced intertrochanteric fracture of left femur, initial encounter (Multi)    Postop day 1 ORIF hip fracture doing as expected.  Drop in hematocrit consistent with fracture and operative blood loss, along with rehydration.  Currently stable vitals so will follow, leaving potential transfusion up to primary service.  Patient will likely require temporary rehab post discharge.  Dressings can remain on for the first week and then be removed left open to air.  She may shower over the dressings.  She is weightbearing as tolerated with max assist/walker.  She will need to be seen in my office 2 weeks postop for'staple removal and x-ray.  I will be leaving town today returning Sunday p.m., can be reached by secure chat, Dr. Meza is following for on-call orthopedics.       I spent 10 minutes in the professional and overall care of this patient.      Sergei Kaufman MD

## 2024-08-02 VITALS
DIASTOLIC BLOOD PRESSURE: 70 MMHG | WEIGHT: 94 LBS | TEMPERATURE: 97.4 F | HEART RATE: 112 BPM | SYSTOLIC BLOOD PRESSURE: 137 MMHG | RESPIRATION RATE: 16 BRPM | BODY MASS INDEX: 17.3 KG/M2 | HEIGHT: 62 IN | OXYGEN SATURATION: 99 %

## 2024-08-02 LAB
ANION GAP SERPL CALC-SCNC: 14 MMOL/L (ref 10–20)
BUN SERPL-MCNC: 23 MG/DL (ref 6–23)
CALCIUM SERPL-MCNC: 8.1 MG/DL (ref 8.6–10.3)
CHLORIDE SERPL-SCNC: 105 MMOL/L (ref 98–107)
CO2 SERPL-SCNC: 20 MMOL/L (ref 21–32)
CREAT SERPL-MCNC: 0.6 MG/DL (ref 0.5–1.05)
EGFRCR SERPLBLD CKD-EPI 2021: 84 ML/MIN/1.73M*2
ERYTHROCYTE [DISTWIDTH] IN BLOOD BY AUTOMATED COUNT: 13.6 % (ref 11.5–14.5)
GLUCOSE SERPL-MCNC: 121 MG/DL (ref 74–99)
HCT VFR BLD AUTO: 25.1 % (ref 36–46)
HGB BLD-MCNC: 8 G/DL (ref 12–16)
MAGNESIUM SERPL-MCNC: 1.58 MG/DL (ref 1.6–2.4)
MCH RBC QN AUTO: 30.2 PG (ref 26–34)
MCHC RBC AUTO-ENTMCNC: 31.9 G/DL (ref 32–36)
MCV RBC AUTO: 95 FL (ref 80–100)
NRBC BLD-RTO: 0.3 /100 WBCS (ref 0–0)
PLATELET # BLD AUTO: 173 X10*3/UL (ref 150–450)
POTASSIUM SERPL-SCNC: 4 MMOL/L (ref 3.5–5.3)
RBC # BLD AUTO: 2.65 X10*6/UL (ref 4–5.2)
SODIUM SERPL-SCNC: 135 MMOL/L (ref 136–145)
WBC # BLD AUTO: 6.8 X10*3/UL (ref 4.4–11.3)

## 2024-08-02 PROCEDURE — 80048 BASIC METABOLIC PNL TOTAL CA: CPT | Performed by: SPECIALIST

## 2024-08-02 PROCEDURE — 99239 HOSP IP/OBS DSCHRG MGMT >30: CPT | Performed by: INTERNAL MEDICINE

## 2024-08-02 PROCEDURE — 97530 THERAPEUTIC ACTIVITIES: CPT | Mod: GP,CQ | Performed by: PHYSICAL THERAPY ASSISTANT

## 2024-08-02 PROCEDURE — 2500000001 HC RX 250 WO HCPCS SELF ADMINISTERED DRUGS (ALT 637 FOR MEDICARE OP): Performed by: SPECIALIST

## 2024-08-02 PROCEDURE — 2500000004 HC RX 250 GENERAL PHARMACY W/ HCPCS (ALT 636 FOR OP/ED): Performed by: PHYSICIAN ASSISTANT

## 2024-08-02 PROCEDURE — 85027 COMPLETE CBC AUTOMATED: CPT | Performed by: SPECIALIST

## 2024-08-02 PROCEDURE — 83735 ASSAY OF MAGNESIUM: CPT | Performed by: SPECIALIST

## 2024-08-02 PROCEDURE — 94668 MNPJ CHEST WALL SBSQ: CPT

## 2024-08-02 PROCEDURE — 36415 COLL VENOUS BLD VENIPUNCTURE: CPT | Performed by: SPECIALIST

## 2024-08-02 PROCEDURE — 97116 GAIT TRAINING THERAPY: CPT | Mod: GP,CQ | Performed by: PHYSICAL THERAPY ASSISTANT

## 2024-08-02 PROCEDURE — 97110 THERAPEUTIC EXERCISES: CPT | Mod: GP,CQ | Performed by: PHYSICAL THERAPY ASSISTANT

## 2024-08-02 RX ORDER — AMOXICILLIN 250 MG
1 CAPSULE ORAL 2 TIMES DAILY PRN
Start: 2024-08-02 | End: 2024-08-16

## 2024-08-02 RX ORDER — ASPIRIN 81 MG/1
81 TABLET ORAL 2 TIMES DAILY
Start: 2024-08-02 | End: 2024-09-13

## 2024-08-02 RX ORDER — OXYCODONE HYDROCHLORIDE 5 MG/1
5 TABLET ORAL 2 TIMES DAILY PRN
Qty: 6 TABLET | Refills: 0 | Status: SHIPPED | OUTPATIENT
Start: 2024-08-02 | End: 2024-08-05

## 2024-08-02 RX ORDER — ACETAMINOPHEN 500 MG
1000 TABLET ORAL EVERY 8 HOURS PRN
Start: 2024-08-02 | End: 2024-08-05

## 2024-08-02 ASSESSMENT — COGNITIVE AND FUNCTIONAL STATUS - GENERAL
DRESSING REGULAR LOWER BODY CLOTHING: A LOT
TURNING FROM BACK TO SIDE WHILE IN FLAT BAD: A LOT
CLIMB 3 TO 5 STEPS WITH RAILING: TOTAL
STANDING UP FROM CHAIR USING ARMS: A LOT
STANDING UP FROM CHAIR USING ARMS: A LOT
PERSONAL GROOMING: A LITTLE
MOBILITY SCORE: 12
TURNING FROM BACK TO SIDE WHILE IN FLAT BAD: A LOT
WALKING IN HOSPITAL ROOM: A LOT
WALKING IN HOSPITAL ROOM: A LOT
MOVING FROM LYING ON BACK TO SITTING ON SIDE OF FLAT BED WITH BEDRAILS: A LITTLE
DRESSING REGULAR UPPER BODY CLOTHING: A LOT
PERSONAL GROOMING: A LITTLE
CLIMB 3 TO 5 STEPS WITH RAILING: TOTAL
MOVING TO AND FROM BED TO CHAIR: A LOT
EATING MEALS: A LITTLE
DAILY ACTIVITIY SCORE: 15
MOVING TO AND FROM BED TO CHAIR: A LOT
EATING MEALS: A LITTLE
CLIMB 3 TO 5 STEPS WITH RAILING: TOTAL
TURNING FROM BACK TO SIDE WHILE IN FLAT BAD: A LOT
MOBILITY SCORE: 12
STANDING UP FROM CHAIR USING ARMS: A LOT
MOVING TO AND FROM BED TO CHAIR: A LOT
HELP NEEDED FOR BATHING: A LOT
DRESSING REGULAR UPPER BODY CLOTHING: A LOT
MOVING FROM LYING ON BACK TO SITTING ON SIDE OF FLAT BED WITH BEDRAILS: A LITTLE
DRESSING REGULAR LOWER BODY CLOTHING: A LOT
MOVING FROM LYING ON BACK TO SITTING ON SIDE OF FLAT BED WITH BEDRAILS: A LITTLE
WALKING IN HOSPITAL ROOM: A LOT
TOILETING: A LITTLE
TOILETING: A LITTLE
HELP NEEDED FOR BATHING: A LOT

## 2024-08-02 ASSESSMENT — PAIN SCALES - GENERAL: PAINLEVEL_OUTOF10: 0 - NO PAIN

## 2024-08-02 ASSESSMENT — PAIN - FUNCTIONAL ASSESSMENT: PAIN_FUNCTIONAL_ASSESSMENT: 0-10

## 2024-08-02 NOTE — PROGRESS NOTES
Occupational Therapy                 Therapy Communication Note    Patient Name: Joseline Umana  MRN: [id number]  Today's Date: 8/2/2024     Discipline: Occupational Therapy    Missed Visit Reason: Missed Visit Reason: Parent refused (pt. reports she is leaving to facility this afternoon.requested no OT as she is visiting with family.)    Missed Time: Attempt    Comment:

## 2024-08-02 NOTE — DISCHARGE SUMMARY
"Discharge Diagnosis  Acute closed displaced intertrochanteric fracture of left femur (Multi)  Acute on chronic anemia due to blood loss as a result of fracture  Essential hypertension  Acquired hypothyroidism  Acute metabolic encephalopathy unrelated to surgery  None insulin-dependent diabetes mellitus type 2    Issues Requiring Follow-Up  Follow-up in the orthopedic clinic in 2 weeks time.  Continue DVT prophylaxis  Continue pain control  Continue bowel regimen    Discharge Meds     Your medication list        ASK your doctor about these medications        Instructions Last Dose Given Next Dose Due   amLODIPine 10 mg tablet  Commonly known as: Norvasc      Take 1 tablet (10 mg) by mouth once daily.       atorvastatin 40 mg tablet  Commonly known as: Lipitor           levothyroxine 112 mcg capsule  Commonly known as: Tirosint           losartan 50 mg tablet  Commonly known as: Cozaar           pantoprazole 40 mg EC tablet  Commonly known as: ProtoNix           phenytoin  mg capsule  Commonly known as: Dilantin                    Test Results Pending At Discharge  Pending Labs       No current pending labs.            Hospital Course  Joseline Umnaa is a 89 y.o. female who sustained a left greater trochanter fracture due to a fall on 6/14/24. She was admitted, seen by ortho and not felt to be a surgical candidate. She was discharged to SNF. She presented 7/30/24 with left leg and hip pain. She was discharged from the SNF two weeks and was doing \"great\" until she bent over at home to feed her dog today. She felt a sharp pain in the left hip/leg area. She denied any fall onto the leg or any other injury. She has no pain at rest. XR imaging revealed an acute intertrochanteric fracture of the left hip.      8/1/2024: Had some agitation overnight. Vitals stable. Has chronic, hgb 7.8 this morning, expected with fracture, surgical fixation and rehydration. Mag slightly low- replace.   Had ORIF with TFN short nail " "7/31/24. Seen by orthopedics: \"Dressings can remain on for the first week and then be removed left open to air.  She may shower over the dressings.  She is weightbearing as tolerated with max assist/walker.  She will need to be seen in my office 2 weeks postop for'staple removal and x-ray.  I will be leaving town today returning Sunday p.m., can be reached by secure chat, Dr. Meza is following for on-call orthopedics.\"    8/2: No acute events overnight.  Authorization obtained.  Plan discharge to skilled nursing facility today.  Follow-up with orthopedic clinic in 2 weeks time.  See full discharge orders    Pertinent Physical Exam At Time of Discharge  Physical Exam  CONSTITUTIONAL - alert, in no acute distress, not ill-appearing, frail elderly  CARDIAC - regular rate and regular rhythm, no murmur  ABDOMEN - no organomegaly, soft, nontender, nondistended, normal bowel sounds, no guarding/rebound/rigidity  EXTREMITIES - no edema, no deformities  NEUROLOGICAL - alert, oriented x3 and no acute focal signs  PSYCHIATRIC - alert, pleasant and cordial, age-appropriate    Outpatient Follow-Up  No future appointments.    Discharge planning took more than 30 minutes  Darryl Best MD  "

## 2024-08-02 NOTE — NURSING NOTE
Pt discharged. Pt going to [facility]. Report given to Martha. Pt aware on transport. IV removed. Pt to discharged with all personal belongings. Transport scheduled for 1230.

## 2024-08-02 NOTE — PROGRESS NOTES
Physical Therapy    Physical Therapy Treatment    Patient Name: Joseline Umana  MRN: [id number]  Today's Date: 8/2/2024  Time Calculation  Start Time: 0809  Stop Time: 0847  Time Calculation (min): 38 min    Assessment/Plan   PT Assessment  End of Session Communication: Bedside nurse  Assessment Comment: pt would benefit from further skilled PT services to imtrpve mbility and safety.  End of Session Patient Position: Up in chair, Alarm on     PT Plan  Treatment/Interventions: Bed mobility, Transfer training, Gait training, Neuromuscular re-education, Therapeutic exercise, Therapeutic activity, Home exercise program  PT Plan: Ongoing PT, OT consult  PT Frequency: Daily (1-2x/day)  PT Discharge Recommendations: Moderate intensity level of continued care  PT Recommended Transfer Status: Assist x1  PT - OK to Discharge: Yes      General Visit Information:   PT  Visit  PT Received On: 08/02/24  Response to Previous Treatment: Patient with no complaints from previous session.  General  Reason for Referral: L hip ORIF 7/31  Referred By: Mandeep  Past Medical History Relevant to Rehab: L [place] fx. 6/24 with stay at [facility]. TKAs, anemia  Prior to Session Communication: Bedside nurse  Patient Position Received: Bed, 3 rail up, Alarm on  Preferred Learning Style: verbal  General Comment: pt agreeable to PT and cleared by RN. pt reporting she is glad to get up Pt stated she is feeling better today.      Precautions:  Precautions  LE Weight Bearing Status: Weight Bearing as Tolerated  Medical Precautions: Fall precautions    Pain:  Pain Assessment  Pain Assessment: 0-10  0-10 (Numeric) Pain Score: 0 - No pain            Treatments:  Therapeutic Exercise  Therapeutic Exercise Performed: Yes (education provided for technique.)  Therapeutic Exercise Activity 1: ankle pumps, Marches, LAQ, hip ab/adduction and glut sets x 15 ea.    Bed Mobility  Bed Mobility: Yes  Bed Mobility 1  Bed Mobility 1: Supine to sitting  Level  of Assistance 1: Minimum assistance  Bed Mobility Comments 1: cues for technique    Ambulation/Gait Training  Ambulation/Gait Training Performed: Yes  Ambulation/Gait Training 1  Surface 1: Level tile  Device 1: Rolling walker  Assistance 1: Moderate assistance, Moderate verbal cues  Quality of Gait 1: Antalgic, Shuffling gait, Forward flexed posture  Comments/Distance (ft) 1: 10'x2 with cues for posture and gait pattern.  Transfers  Transfer: Yes  Transfer 1  Technique 1: Sit to stand, Stand to sit  Transfer Device 1: Walker  Transfer Level of Assistance 1: Moderate assistance, Moderate verbal cues  Trials/Comments 1: pt required cues for proper hand placement and technique  Transfers 2  Technique 2: Stand pivot  Transfer Device 2: Walker  Transfer Level of Assistance 2: Moderate assistance, Moderate verbal cues  Trials/Comments 2: cues to complete turn prior to sitting    Outcome Measures:  [facility] Basic Mobility  Turning from your back to your side while in a flat bed without using bedrails: A little  Moving from lying on your back to sitting on the side of a flat bed without using bedrails: A lot  Moving to and from bed to chair (including a wheelchair): A lot  Standing up from a chair using your arms (e.g. wheelchair or bedside chair): A lot  To walk in hospital room: A lot  Climbing 3-5 steps with railing: Total  Basic Mobility - Total Score: 12    Education Documentation  Home Exercise Program, taught by Halima Chicas PTA at 8/2/2024  9:06 AM.  Learner: Patient  Readiness: Acceptance  Method: Explanation  Response: Verbalizes Understanding, Needs Reinforcement    Mobility Training, taught by Halima Chicas PTA at 8/2/2024  9:06 AM.  Learner: Patient  Readiness: Acceptance  Method: Explanation  Response: Verbalizes Understanding, Needs Reinforcement    Education Comments  No comments found.           Encounter Problems       Encounter Problems (Active)       Mobility       STG - Patient will ambulate household  distance using walker unassisted (Progressing)       Start:  07/31/24    Expected End:  08/14/24               PT Transfers       STG - Patient will demonstrate safe transfer techniques using walker unassisted (Progressing)       Start:  07/31/24    Expected End:  08/14/24               Pain - Adult          Strengthening        Pt will perform 10+ reps AAROM/AROM/RROM BLE to improve functional strength needed for improved mobility.  (Progressing)       Start:  07/31/24    Expected End:  08/14/24

## 2024-08-02 NOTE — CARE PLAN
Problem: Fall/Injury  Goal: Not fall by end of shift  Outcome: Progressing  Goal: Be free from injury by end of the shift  Outcome: Progressing  Goal: Verbalize understanding of personal risk factors for fall in the hospital  Outcome: Progressing  Goal: Verbalize understanding of risk factor reduction measures to prevent injury from fall in the home  Outcome: Progressing  Goal: Use assistive devices by end of the shift  Outcome: Progressing  Goal: Pace activities to prevent fatigue by end of the shift  Outcome: Progressing     Problem: Skin  Goal: Decreased wound size/increased tissue granulation at next dressing change  Outcome: Progressing  Flowsheets (Taken 8/2/2024 0738)  Decreased wound size/increased tissue granulation at next dressing change:   Promote sleep for wound healing   Protective dressings over bony prominences   Utilize specialty bed per algorithm  Goal: Participates in plan/prevention/treatment measures  Outcome: Progressing  Flowsheets (Taken 8/2/2024 0738)  Participates in plan/prevention/treatment measures:   Discuss with provider PT/OT consult   Elevate heels   Increase activity/out of bed for meals  Goal: Prevent/manage excess moisture  Outcome: Progressing  Flowsheets (Taken 8/2/2024 0738)  Prevent/manage excess moisture:   Cleanse incontinence/protect with barrier cream   Follow provider orders for dressing changes   Use wicking fabric (obtain order)   Monitor for/manage infection if present   Moisturize dry skin  Goal: Prevent/minimize sheer/friction injuries  Outcome: Progressing  Flowsheets (Taken 8/2/2024 0738)  Prevent/minimize sheer/friction injuries:   Complete micro-shifts as needed if patient unable. Adjust patient position to relieve pressure points, not a full turn   Increase activity/out of bed for meals   Turn/reposition every 2 hours/use positioning/transfer devices   HOB 30 degrees or less   Utilize specialty bed per algorithm   Use pull sheet  Goal: Promote/optimize  nutrition  Outcome: Progressing  Flowsheets (Taken 8/2/2024 0738)  Promote/optimize nutrition:   Assist with feeding   Monitor/record intake including meals   Consume > 50% meals/supplements   Offer water/supplements/favorite foods   Discuss with provider if NPO > 2 days   Reassess MST if dietician not consulted  Goal: Promote skin healing  Outcome: Progressing  Flowsheets (Taken 8/2/2024 0738)  Promote skin healing:   Turn/reposition every 2 hours/use positioning/transfer devices   Protective dressings over bony prominences   Assess skin/pad under line(s)/device(s)   Rotate device position/do not position patient on device   Ensure correct size (line/device) and apply per  instructions     Problem: Pain  Goal: Takes deep breaths with improved pain control throughout the shift  Outcome: Progressing  Goal: Turns in bed with improved pain control throughout the shift  Outcome: Progressing  Goal: Walks with improved pain control throughout the shift  Outcome: Progressing  Goal: Performs ADL's with improved pain control throughout shift  Outcome: Progressing  Goal: Participates in PT with improved pain control throughout the shift  Outcome: Progressing  Goal: Free from opioid side effects throughout the shift  Outcome: Progressing  Goal: Free from acute confusion related to pain meds throughout the shift  Outcome: Progressing     Problem: Pain - Adult  Goal: Verbalizes/displays adequate comfort level or baseline comfort level  Outcome: Progressing     Problem: Safety - Adult  Goal: Free from fall injury  Outcome: Progressing     Problem: Discharge Planning  Goal: Discharge to home or other facility with appropriate resources  Outcome: Progressing     Problem: Chronic Conditions and Co-morbidities  Goal: Patient's chronic conditions and co-morbidity symptoms are monitored and maintained or improved  Outcome: Progressing   The patient's goals for the shift include      The clinical goals for the shift include  Pt will be hemodynamically stable throughout shift

## 2024-08-02 NOTE — CARE PLAN
The patient's goals for the shift include      The clinical goals for the shift include pain controlled throughout shift      Problem: Fall/Injury  Goal: Not fall by end of shift  Outcome: Progressing  Goal: Be free from injury by end of the shift  Outcome: Progressing  Goal: Verbalize understanding of personal risk factors for fall in the hospital  Outcome: Progressing  Goal: Verbalize understanding of risk factor reduction measures to prevent injury from fall in the home  Outcome: Progressing  Goal: Use assistive devices by end of the shift  Outcome: Progressing  Goal: Pace activities to prevent fatigue by end of the shift  Outcome: Progressing     Problem: Skin  Goal: Decreased wound size/increased tissue granulation at next dressing change  Outcome: Progressing  Goal: Participates in plan/prevention/treatment measures  Outcome: Progressing  Goal: Prevent/manage excess moisture  Outcome: Progressing  Goal: Prevent/minimize sheer/friction injuries  Outcome: Progressing  Goal: Promote/optimize nutrition  Outcome: Progressing  Goal: Promote skin healing  Outcome: Progressing     Problem: Pain  Goal: Takes deep breaths with improved pain control throughout the shift  Outcome: Progressing  Goal: Turns in bed with improved pain control throughout the shift  Outcome: Progressing  Goal: Walks with improved pain control throughout the shift  Outcome: Progressing  Goal: Performs ADL's with improved pain control throughout shift  Outcome: Progressing  Goal: Participates in PT with improved pain control throughout the shift  Outcome: Progressing  Goal: Free from opioid side effects throughout the shift  Outcome: Progressing  Goal: Free from acute confusion related to pain meds throughout the shift  Outcome: Progressing     Problem: Safety - Adult  Goal: Free from fall injury  Outcome: Progressing     Problem: Discharge Planning  Goal: Discharge to home or other facility with appropriate resources  Outcome: Progressing      Problem: Chronic Conditions and Co-morbidities  Goal: Patient's chronic conditions and co-morbidity symptoms are monitored and maintained or improved  Outcome: Progressing

## 2024-08-02 NOTE — PROGRESS NOTES
Patient is discharging to [facility] today. [facility] arranged transport for 12:30 PM, discharge orders have been sent. Bedside RN aware and N2N report number given. Patient aware.

## 2024-08-08 ENCOUNTER — LAB REQUISITION (OUTPATIENT)
Dept: LAB | Facility: HOSPITAL | Age: 89
End: 2024-08-08

## 2024-08-08 DIAGNOSIS — I10 ESSENTIAL (PRIMARY) HYPERTENSION: ICD-10-CM

## 2024-08-08 LAB
ERYTHROCYTE [DISTWIDTH] IN BLOOD BY AUTOMATED COUNT: 13.9 % (ref 11.5–14.5)
HCT VFR BLD AUTO: 24.6 % (ref 36–46)
HGB BLD-MCNC: 8 G/DL (ref 12–16)
HOLD SPECIMEN: NORMAL
MCH RBC QN AUTO: 31 PG (ref 26–34)
MCHC RBC AUTO-ENTMCNC: 32.5 G/DL (ref 32–36)
MCV RBC AUTO: 95 FL (ref 80–100)
NRBC BLD-RTO: 0 /100 WBCS (ref 0–0)
PLATELET # BLD AUTO: 304 X10*3/UL (ref 150–450)
RBC # BLD AUTO: 2.58 X10*6/UL (ref 4–5.2)
WBC # BLD AUTO: 7.5 X10*3/UL (ref 4.4–11.3)

## 2024-08-08 PROCEDURE — 85027 COMPLETE CBC AUTOMATED: CPT | Mod: OUT | Performed by: INTERNAL MEDICINE

## 2024-08-09 DIAGNOSIS — S72.115A CLOSED NONDISPLACED FRACTURE OF GREATER TROCHANTER OF LEFT FEMUR, INITIAL ENCOUNTER (MULTI): ICD-10-CM

## 2024-08-13 ENCOUNTER — HOSPITAL ENCOUNTER (OUTPATIENT)
Dept: RADIOLOGY | Facility: HOSPITAL | Age: 89
Discharge: HOME | End: 2024-08-13
Payer: MEDICARE

## 2024-08-13 ENCOUNTER — OFFICE VISIT (OUTPATIENT)
Dept: ORTHOPEDIC SURGERY | Facility: HOSPITAL | Age: 89
End: 2024-08-13
Payer: MEDICARE

## 2024-08-13 VITALS — WEIGHT: 94 LBS | BODY MASS INDEX: 17.3 KG/M2 | HEIGHT: 62 IN

## 2024-08-13 DIAGNOSIS — S72.115A CLOSED NONDISPLACED FRACTURE OF GREATER TROCHANTER OF LEFT FEMUR, INITIAL ENCOUNTER (MULTI): ICD-10-CM

## 2024-08-13 DIAGNOSIS — S72.002D CLOSED FRACTURE OF NECK OF LEFT FEMUR WITH ROUTINE HEALING, SUBSEQUENT ENCOUNTER: Primary | ICD-10-CM

## 2024-08-13 PROCEDURE — 73502 X-RAY EXAM HIP UNI 2-3 VIEWS: CPT | Mod: LT

## 2024-08-13 PROCEDURE — 1111F DSCHRG MED/CURRENT MED MERGE: CPT | Performed by: SPECIALIST

## 2024-08-13 PROCEDURE — 1159F MED LIST DOCD IN RCRD: CPT | Performed by: SPECIALIST

## 2024-08-13 PROCEDURE — 73502 X-RAY EXAM HIP UNI 2-3 VIEWS: CPT | Mod: LEFT SIDE | Performed by: RADIOLOGY

## 2024-08-13 NOTE — PROGRESS NOTES
Follow up Closed displaced intertrochanteric fracture of left femur  DOS 7/31/24  Xrays today   Patient states she feels a lot better pain is tolerable and she hasn't needed pain meds does use walker, no other constitutional symptoms.    Exam: Left thigh with minimal swelling well-healed incisions.  Passive motion left hip is intact with minimal pain.  Dermis is intact neurovascular intact no ecchymosis no erythema.  Negative Homans.    Radiographs left hip show intact hardware, with a stable well aligned fracture fixation.    Status post ORIF left hip fracture as above.  She can be allowed to continue weightbearing as tolerated with a walker and assistance.  She understands she will progressively improve for 3 to 6 months.  Will do a reassessment with x-rays of the left hip in 6 weeks.

## 2024-09-20 DIAGNOSIS — S72.002D CLOSED FRACTURE OF NECK OF LEFT FEMUR WITH ROUTINE HEALING, SUBSEQUENT ENCOUNTER: ICD-10-CM

## 2024-09-24 ENCOUNTER — HOSPITAL ENCOUNTER (OUTPATIENT)
Dept: RADIOLOGY | Facility: HOSPITAL | Age: 89
Discharge: HOME | End: 2024-09-24
Payer: MEDICARE

## 2024-09-24 ENCOUNTER — OFFICE VISIT (OUTPATIENT)
Dept: ORTHOPEDIC SURGERY | Facility: HOSPITAL | Age: 89
End: 2024-09-24
Payer: MEDICARE

## 2024-09-24 VITALS — BODY MASS INDEX: 17.3 KG/M2 | HEIGHT: 62 IN | WEIGHT: 94 LBS

## 2024-09-24 DIAGNOSIS — S72.002D CLOSED FRACTURE OF NECK OF LEFT FEMUR WITH ROUTINE HEALING, SUBSEQUENT ENCOUNTER: Primary | ICD-10-CM

## 2024-09-24 DIAGNOSIS — S72.002D CLOSED FRACTURE OF NECK OF LEFT FEMUR WITH ROUTINE HEALING, SUBSEQUENT ENCOUNTER: ICD-10-CM

## 2024-09-24 PROCEDURE — 1159F MED LIST DOCD IN RCRD: CPT | Performed by: SPECIALIST

## 2024-09-24 PROCEDURE — 99211 OFF/OP EST MAY X REQ PHY/QHP: CPT | Performed by: SPECIALIST

## 2024-09-24 PROCEDURE — 73502 X-RAY EXAM HIP UNI 2-3 VIEWS: CPT | Mod: LT

## 2024-09-24 PROCEDURE — 73502 X-RAY EXAM HIP UNI 2-3 VIEWS: CPT | Mod: LEFT SIDE | Performed by: RADIOLOGY

## 2024-09-24 PROCEDURE — 1036F TOBACCO NON-USER: CPT | Performed by: SPECIALIST

## 2024-09-24 NOTE — PROGRESS NOTES
POV Closed fracture of neck of left femur with routine healing DOS 7/31/24 XR done today   Patient Is doing well no pain.  No constitutional symptoms.    Exam: Left hip with well-healed incision no swelling.  Negative Homans.  Distal neurovascular intact.  Passive range of motion especially internal rotation slightly restricted but does not reproduce any significant pain.  For plane strength testing left hip intact.    Radiographs AP and lateral left hip shows intact hardware and well-healed fracture.    Assessment plan: Well-healed left hip fracture.  Suggest ongoing use of the walker to prevent falls.  Will follow-up if any new symptoms arise otherwise as needed

## (undated) DEVICE — PAD, GROUNDING, ELECTROSURGICAL, W/9 FT CABLE, POLYHESIVE II, ADULT, LF

## (undated) DEVICE — DRILL BIT, STEPPED, 6MM/9MM CANNULATED

## (undated) DEVICE — GLOVE, SURGICAL, PROTEXIS PI BLUE W/NEUTHERA, 8.0, PF, LF

## (undated) DEVICE — COVER HANDLE LIGHT, STERIS, BLUE, STERILE

## (undated) DEVICE — STAPLER, SKIN, MULTIFIRE, PREMIUM, WIDE, 35 W

## (undated) DEVICE — SUTURE, VICRYL, 0, 36 IN, CT-1, UNDYED

## (undated) DEVICE — GUIDEWIRE, 3.2 X 400

## (undated) DEVICE — SYRINGE, 30 CC, LUER LOCK

## (undated) DEVICE — KIT, COMPRESSION HIP, CUSTOM, PORTAGE

## (undated) DEVICE — SOLUTION, IRRIGATION, SODIUM CHLORIDE 0.9%, 1000 ML, POUR BOTTLE

## (undated) DEVICE — TOWEL PACK, STERILE, 4/PACK, BLUE

## (undated) DEVICE — GLOVE, PROTEXIS PI CLASSIC, SZ-8.0, PF, PF, LF

## (undated) DEVICE — NEEDLE, HYPODERMIC, REGULAR WALL, REGULAR BEVEL, 22 G X 1.5 IN

## (undated) DEVICE — SUTURE, VICRYL, 2-0, 27 IN, FS-1, UNDYED

## (undated) DEVICE — APPLICATOR, CHLORAPREP, W/ORANGE TINT, 26ML

## (undated) DEVICE — DRILL BIT, 4.2MM 3-FLUTED QC 330MM 100MM CALB